# Patient Record
Sex: FEMALE | Race: WHITE | NOT HISPANIC OR LATINO | Employment: FULL TIME | URBAN - METROPOLITAN AREA
[De-identification: names, ages, dates, MRNs, and addresses within clinical notes are randomized per-mention and may not be internally consistent; named-entity substitution may affect disease eponyms.]

---

## 2017-05-05 ENCOUNTER — TRANSFERRED RECORDS (OUTPATIENT)
Dept: HEALTH INFORMATION MANAGEMENT | Facility: CLINIC | Age: 57
End: 2017-05-05

## 2017-05-17 ENCOUNTER — TRANSFERRED RECORDS (OUTPATIENT)
Dept: HEALTH INFORMATION MANAGEMENT | Facility: CLINIC | Age: 57
End: 2017-05-17

## 2017-05-31 ENCOUNTER — TRANSFERRED RECORDS (OUTPATIENT)
Dept: HEALTH INFORMATION MANAGEMENT | Facility: CLINIC | Age: 57
End: 2017-05-31

## 2017-06-26 ENCOUNTER — OFFICE VISIT (OUTPATIENT)
Dept: CARDIOLOGY | Facility: CLINIC | Age: 57
End: 2017-06-26
Attending: NURSE PRACTITIONER
Payer: COMMERCIAL

## 2017-06-26 ENCOUNTER — PRE VISIT (OUTPATIENT)
Dept: CARDIOLOGY | Facility: CLINIC | Age: 57
End: 2017-06-26

## 2017-06-26 VITALS
HEIGHT: 70 IN | DIASTOLIC BLOOD PRESSURE: 96 MMHG | BODY MASS INDEX: 33.93 KG/M2 | HEART RATE: 78 BPM | SYSTOLIC BLOOD PRESSURE: 134 MMHG | OXYGEN SATURATION: 98 % | WEIGHT: 237 LBS

## 2017-06-26 DIAGNOSIS — I48.0 PAROXYSMAL ATRIAL FIBRILLATION (H): Primary | ICD-10-CM

## 2017-06-26 PROCEDURE — 93005 ELECTROCARDIOGRAM TRACING: CPT | Mod: ZF

## 2017-06-26 PROCEDURE — 99212 OFFICE O/P EST SF 10 MIN: CPT | Mod: ZF

## 2017-06-26 PROCEDURE — 93010 ELECTROCARDIOGRAM REPORT: CPT | Mod: ZP | Performed by: INTERNAL MEDICINE

## 2017-06-26 PROCEDURE — 99204 OFFICE O/P NEW MOD 45 MIN: CPT | Mod: ZP | Performed by: NURSE PRACTITIONER

## 2017-06-26 RX ORDER — POTASSIUM CHLORIDE 1500 MG/1
40 TABLET, EXTENDED RELEASE ORAL
Status: CANCELLED | OUTPATIENT
Start: 2017-06-26

## 2017-06-26 RX ORDER — POTASSIUM CHLORIDE 1500 MG/1
20 TABLET, EXTENDED RELEASE ORAL
Status: CANCELLED | OUTPATIENT
Start: 2017-06-26

## 2017-06-26 RX ORDER — VENLAFAXINE HYDROCHLORIDE 150 MG/1
150 TABLET, EXTENDED RELEASE ORAL EVERY EVENING
COMMUNITY

## 2017-06-26 RX ORDER — LOSARTAN POTASSIUM AND HYDROCHLOROTHIAZIDE 12.5; 5 MG/1; MG/1
1 TABLET ORAL DAILY
COMMUNITY
End: 2017-06-26

## 2017-06-26 RX ORDER — AMOXICILLIN 250 MG
2 CAPSULE ORAL EVERY MORNING
COMMUNITY

## 2017-06-26 RX ORDER — LIDOCAINE 40 MG/G
CREAM TOPICAL
Status: CANCELLED | OUTPATIENT
Start: 2017-06-26

## 2017-06-26 RX ORDER — MAGNESIUM SULFATE HEPTAHYDRATE 40 MG/ML
2 INJECTION, SOLUTION INTRAVENOUS
Status: CANCELLED | OUTPATIENT
Start: 2017-06-26

## 2017-06-26 NOTE — PATIENT INSTRUCTIONS
You are scheduled for a Transesophageal Echocardiogram followed by a Cardioversion at the [facility] ([address], [facility] [address], [phone]).       Follow these instructions:    1. Report to the GOLD waiting room in the [facility] hospital on: June 27   At 12pm  PLEASE DO FOLLOW THE TIME INDICATED ON THE APPOINTMENT REMINDER CALL.    2. DO NOT EAT OR DRINK ANYTHING FOR 6 HOURS PRIOR TO ARRIVAL.     3. The morning of your procedure you may take your scheduled medications with a SIP of water. If you take diabetic medications or a diuretic, you may hold these.     4. You will receive medication that makes you sleepy; you will need a  and someone to stay with you for 24 hours following this procedure.    You should not make any legal decisions for 24 hours following discharge.         If your question concerns the schedule/appointment times, contact:  ZEENAT Borrero Procedure   [phone]          Cardiology Provider you saw in clinic today: ADRIANNA Kirby CNP    Medication Changes:   No changes today    Labs/Tests needed:  None today     Follow-up Visit:   Go forward with [facility].      Further Instructions:      You will receive all normal lab and testing results via [employer] or mail if not reviewed in clinic today. Please contact our office if you need assistance with setting up MyChart.    If you need a medication refill please contact your pharmacy. Please allow 3 business days for your refill to be completed.     As always, thank you for trusting us with your health care needs!    If you have any questions regarding your visit please contact your care team:   Cardiology  Telephone Number    EP RN  Electrophysiology Nurse Coordinator [phone]     Call for EP procedure scheduling concerns  ZEENAT Borrero  [phone]           Device Clinic (Pacemakers, ICDs, Loop)   RN's : Ana Sanchez Connie, Dawn During business hours: [phone]    After  business hours:   [phone]- select option 4 and ask for job code [id number].          Cardioversion  Cardioversion is a procedure to restore your heart s normal rhythm from a fast or irregular rhythm (arrhythmia) in the top or bottom chambers of your heart. You may have the procedure in a hospital or surgery center. It s often done on an outpatient (same day) basis. During the procedure, your doctor will give you medication to minimize discomfort. Then the doctor gives you a brief electric shock. This helps your heartbeat become normal again. In most cases, you can go home within hours of the procedure.     During cardioversion, you are fully sedated and won t feel a thing.     Before Your Procedure    Tell your doctor what over-the-counter and prescription medications, herbs, and supplements you are taking.    Take medication as directed. Your doctor may prescribe anticoagulants (blood thinners), depending on your situation. They help prevent blood clots from forming. Your doctor may order an ultrasound called transesophageal echocardiography. This will help your doctor figure out if you need a blood thinner.     Ask your doctor about the risks and benefits of cardioversion.    Sign your consent form.    Don t eat or drink anything for 8 to 12 hours before your procedure.    Follow any other instructions your doctor gives you.     Arrange for an adult to drive you home after the procedure.  During Your Procedure    Your health care provider will place small pads (electrodes) on your chest to record your heartbeat at all times.    Your health care provider will place an intravenous (IV) line in your arm. This gives you medication (sedation) that keeps you free of pain. You ll feel sleepy.    Your health care provider will give you oxygen through a soft, plastic tube in your nose.    Pads will be placed on your chest and back. Your health care provider will give you a very brief electric shock through the pads.  Remember, because of sedation, you won t feel the shock.    Your doctor will watch your heartbeat to make sure your normal rhythm has been restored.   After Your Procedure    Your health care provider will monitor you until you are fully awake. Then you ll be able to sit up, walk, and eat.    In most cases, you ll be able to go home after the sedation wears off. This usually takes a few hours.    For a few days, the skin on your chest may feel a little sore, like a mild sunburn.    Don t drive or operate heavy machinery for 24 hours after the procedure.    The day after your procedure, try to take it easy. Take medication as directed.    Call your doctor if you notice skipped beats, a rapid heartbeat, or chest tightness. These may be signs that an irregular heartbeat has returned.  Date Last Reviewed: 2/26/2014 2000-2017 [employer]. [address] [place] [address]. Todos los derechos reservados. Esta información no pretende sustituir la atención médica profesional. Sólo vargas médico puede diagnosticar y tratar un problema de rebecca.        Cardioversion  Cardioversion is a procedure to restore your heart s normal rhythm from a fast or irregular rhythm (arrhythmia) in the top or bottom chambers of your heart. You may have the procedure in a hospital or surgery center. It s often done on an outpatient (same day) basis. During the procedure, your doctor will give you medication to minimize discomfort. Then the doctor gives you a brief electric shock. This helps your heartbeat become normal again. In most cases, you can go home within hours of the procedure.     During cardioversion, you are fully sedated and won t feel a thing.     Before Your Procedure    Tell your doctor what over-the-counter and prescription medications, herbs, and supplements you are taking.    Take medication as directed. Your doctor may prescribe anticoagulants (blood thinners), depending on your situation. They help  prevent blood clots from forming. Your doctor may order an ultrasound called transesophageal echocardiography. This will help your doctor figure out if you need a blood thinner.     Ask your doctor about the risks and benefits of cardioversion.    Sign your consent form.    Don t eat or drink anything for 8 to 12 hours before your procedure.    Follow any other instructions your doctor gives you.     Arrange for an adult to drive you home after the procedure.  During Your Procedure    Your health care provider will place small pads (electrodes) on your chest to record your heartbeat at all times.    Your health care provider will place an intravenous (IV) line in your arm. This gives you medication (sedation) that keeps you free of pain. You ll feel sleepy.    Your health care provider will give you oxygen through a soft, plastic tube in your nose.    Pads will be placed on your chest and back. Your health care provider will give you a very brief electric shock through the pads. Remember, because of sedation, you won t feel the shock.    Your doctor will watch your heartbeat to make sure your normal rhythm has been restored.   After Your Procedure    Your health care provider will monitor you until you are fully awake. Then you ll be able to sit up, walk, and eat.    In most cases, you ll be able to go home after the sedation wears off. This usually takes a few hours.    For a few days, the skin on your chest may feel a little sore, like a mild sunburn.    Don t drive or operate heavy machinery for 24 hours after the procedure.    The day after your procedure, try to take it easy. Take medication as directed.    Call your doctor if you notice skipped beats, a rapid heartbeat, or chest tightness. These may be signs that an irregular heartbeat has returned.  Date Last Reviewed: 2/26/2014 2000-2017 [employer]. [address] [place] [address], [place] [address]. Todos los derechos reservados. Esta información  no pretende sustituir la atención médica profesional. Sólo vargas médico puede diagnosticar y tratar un problema de rebecca.

## 2017-06-26 NOTE — PROGRESS NOTES
Clinical Cardiac Electrophysiology    Chief Complaint: atrial fibrillation    HPI: Barb Hernandez is a 57 year old female who presents to [facility] care.  She gets her regulary cardiology care  at [facility] and is scheduled to have Pulmonary vein isolation in August of which she is scheduled to have a CT completed on 6/29.   Her past medical history includes HTN, FMD, hyperlipidemia, depression, anxiety    Today she presents with her  for paroxsymal atrial fibrillation.  States she has always had an irregular heart beat and has mitral valve regurgitation.  Describes her first episode of atrial fibrillation occurring in 2015 with an acute illness.  Over the past year she has had multiple ED visits due to atrial fibrillation. Denies receiving DCCV.  Was on flecainide 50 mg BID but was discontinued because she was not able to undergo a stress test due to high right rates.   On June 6, she was started on amiodarone 400 mg daily until 6/6 then 200 mg daily for 1 month.  When in atrial fibrillation, she describes her symptoms as  presyncope, SOB, fatigue, palpitations.  She is currently having symptoms at least weekly and last from a few minutes to all day.  She often will take an extra metoprolol XL 25 mg when these symptoms occur.  She does not regularly exercise due to tachycardia and palpitations.        Today's EKG atrial fibrillation with ventricular rate of 83 bpm.     Her history appears to be the following  The MD note from 4/24/2015 at [facility] her atrial fibrillation was resolved once changing from albuterol and adding diltiazem and metoprolol but she was bradycardic and lightheaded;  diltiazem was discontinued.       On 5/22/2015, saw Dr. Harry Gan in cardiology for atrial fib and symptomatic PVCs documented on 2 holter monitors.  Recommended ECHO and 30 day monitor which showed 1.1% PVC burden, no afib, 16 PACs, mild LA dilation, mild mitral regurgitation.     3/16/2017  started on Xarelto and increased metoprolol  mg daily    5/5/2017  Saw cardiology was noted to started on flecainide 50 mg BID started on 3/28/2017 with  decreased palpitations    5/17/2017 Stopped metoprolol 5/14 due to scheduled stress test presented to the ED with palpitations, SOB, lightheadedness      5/31/2017 ED with atrial fibrillation given IV fluids and metoprolol       Current Outpatient Prescriptions   Medication Sig Dispense Refill     metoprolol-hydrochlorothiazide (DUTOPROL) 50-12.5 MG TB24 per tablet Take 1 tablet by mouth daily       losartan-hydrochlorothiazide (HYZAAR) 50-12.5 MG per tablet Take 1 tablet by mouth daily       HYDROCHLOROTHIAZIDE PO Take 25 mg by mouth daily       venlafaxine (EFFEXOR-ER) 150 MG TB24 24 hr tablet Take 150 mg by mouth daily (with breakfast)       BUSPIRONE HCL PO Take 10 mg by mouth daily       PRAVASTATIN SODIUM PO Take 20 mg by mouth daily       senna-docusate (SENOKOT-S;PERICOLACE) 8.6-50 MG per tablet Take 1 tablet by mouth daily as needed for constipation       magnesium sulfate (EPSOM SALT) GRAN granules Apply topically 2 times daily as needed for skin care       Rivaroxaban (XARELTO PO) Take 20 mg by mouth daily       AMIODARONE HCL PO Take 400 mg by mouth daily       [START ON 7/7/2017] AMIODARONE HCL PO Take 200 mg by mouth daily         Past Medical History:   Diagnosis Date     Atrial fibrillation (H)      Fibromyalgia      HTN (hypertension)        No past surgical history on file.    Family History   Problem Relation Age of Onset     Heart Murmur Sister      prolapse       Social History   Substance Use Topics     Smoking status: Not on file     Smokeless tobacco: Not on file     Alcohol use Not on file       Allergies not on file      ROS:   CONSTITUTIONAL:No report of fever, chills,  or change in weight  RESPIRATORY: No cough, wheezing, SOB, or hemoptysis  CARDIOVASCULAR: see HPI  MUSCULO-SKELETAL: No joint pain/swelling, no muslce pain  NEURO:  "No paresthesias, syncope, pre-syncope, light headness, dizziness or vertigo  ENDOCRINE: No temperature intolerance, no skin/hair changes  PSYCHIATRIC: No change in mood, feeling down/anxious, no change in sleep or appetite  GI: no melena or hematochezia, no change in bowel habits  : no hematuria or dysurea, no hesitancy, dribbling or incontinence  HEME: no easy bruising or bleeding, no history of anemia, no history of blood clots  SKIN: no rashes or sores, no unusual itching        Physical Examination:  Vitals: BP (!) 134/96 (BP Location: Right arm, Patient Position: Sitting, Cuff Size: Adult Regular)  Pulse 78  Ht 1.765 m (5' 9.5\")  Wt 107.5 kg (237 lb)  SpO2 98%  BMI 34.5 kg/m2  BMI= There is no height or weight on file to calculate BMI.    GENERAL APPEARANCE: obese, healthy, alert and no distress  HEENT: no icterus, no xanthelasmas, normal pupil size and reaction, normal palate, mucosa moist, no cyanosis.  NECK: no adenopathy, no asymmetry, masses, or scars, carotid upstrokes are normal bilaterally, no cervical bruits, JVP is not visible  CHEST: lungs clear to auscultation - no rales, rhonchi or wheezes, no use of accessory muscles, no retractions, respirations are unlabored, normal respiratory rate, no kyphosis, no scoliosis  CARDIOVASCULAR: irregularly irregular with murmur, normal S1 with physiologic split S2, no S3 or S4, click or rub, precordium quiet with normal PMI.  ABDOMEN: soft, obese, non tender,  bowel sounds normal, aorta not enlarged by palpation, no abdominal bruits  EXTREMITIES: no clubbing, cyanosis or edema  NEURO: alert and oriented to person/place/time, normal speech, gait and affect  VASC: Radial and posterior tibialis pulses are normal in volumes and symmetric bilaterally.   SKIN: no ecchymoses, no rashes      Laboratory:          ECHO 3/16/2017  Clinical Indications:AFIB         CONCLUSION:    Normal LV size and systolic function. EF 65%    Normal RV size and function.     Mild to " moderate  mitral regurgitation. Normal mitral valve anatomy.     Normal aortic valve function and structure.     Normal IVC size and mean RA pressure 3 mm Hg. Est RV peak     pressure of 24 mm Hg.      Left Ventricular Ejection Fraction: 65 %        ICD Codes:        Technical Quality:      Patient Vital Signs: Ht HT  71                         Ht(in):                         Wt (lb):238                         BSA:   2.33                         BP:    139  / 74            IV Information:    IV Inserted By:    IV Site:    IV Site Appearance:    IV Size:    IV Removed By:    IV Other:                     2D, Doppler and color flow Doppler study                      performed.           Measurements:    M-MODE    IVS to PW Ratio MM                1.3                       Aortic Root Diameter MM           2.5 cm                Show      2D ECHO    Body Height                       71 in                     Body Weight                       238 lb                    Body Surface Area                 2.3 m                     LV Diastolic Diameter Base LX     4.4 cm                3.5-5.7    LV Systolic Diameter Base LX      2.8 cm                2.3-4.9    LV Diastolic Diameter Index       1.9 cm/m                  IVS Diastolic Thickness           1.2 cm                0.6-1.1 cm    LVPW Diastolic Thickness          0.9 cm                0.6-1.1 cm    Ascending Aorta Diameter          2.9 cm                    LA Area 2C View                   21.1 cm               <= 20 cm       DOPPLER    LVOT Diameter                     1.8 cm                    LVOT Area                         2.5 cm                    Mitral E Point Velocity           92.7 cm/s                 Mitral  A Point Velocity          55 cm/s                   Mitral E to A Ratio               1.7                       PV Peak Velocity                  120 cm/s                  PV Peak Gradient                  5.8 mmHg                  TR Peak  Velocity                  229 cm/s                  TR Peak Gradient                  21 mmHg                   LV E' Lateral Velocity            8.5 cm/s                  LV E' Septal Velocity             7.5 cm/s                  Mitral E to LV E' Lateral Ratio   10.9                      Mitral E to LV E' Septal Ratio    12.3                        COLOR DOPPLER    MR Peak Velocity                  461 cm/s                  LVOT Diameter                     1.8 cm                    LA Area 2C View                   21.1 cm               <= 20 cm               Left Ventricle:     Normal LV size and systolic function.    Right Ventricle:    Normal RV size and function.    Left Atrium:        Normal LA size.    Right Atrium:       Normal RA size.    Aortic Valve:       Aortic valve is tri-leaflet and functions                         normally.    Mitral Valve:       Mild mitral regurgitation. Normal mitral valve                         anatomy.    Tricuspid Valve:    Trace tricuspid regurgitation. Normal tricuspid                         valve anatomy.    Pulmonic Valve:     Probably normal pulmonic valve.    Aorta:              Visualized limited portions of the aorta are                         normal.    Pericardium:        Normal pericardium.    IVC:                Normal IVC.    IAS:                Interatrial septum appears intact.    3D Imaging/Contrast:        Assessment and recommendations:    We discussed in detail with the patient management/treatment options for Ana Luisa includin. Stroke Prophylaxis:  CHADSVASC=female, HTN  2, corresponding to a 2.2% annual stroke / systemic emolism event rate. indicating need for long term oral anticoagulation.  SHe is appropriately on Xarelto and has taken uninterrupted for at least the last 30 days   2. Rate Control: She is currently taking metoprolol XL 50 mg daily which is decreased from 100 mg due to side effects.    3. Rhythm Control: Cardioversion,  Antiarrhythmics and/or ablation are options for rhythm control. She is scheduled for a PVI ablation at [facility] in August 2017 and has a CT scheduled on June 29 as part of the workup.   However, she would like to transfer care to [facility].   Explained to her this writer does not preform PVI ablations and she would need to see a electrophysiologist at [facility] who would preform their own evaluation of her condition.  She would like to proceed with a DCCV this week.  She has taken Xarelto for at least the past 30 days, she is currently on amiodarone, and feeling symptomatic with atrial fibrillation.    4. Risk Factor Management: She is schedule to have a sleep study in August 2017.      Schedule a DCCV   Follow up with Dr. Doll    I appreciate the chance to help with Ms. David salazar. Please let me know if you have any questions or concerns.    ADRIANNA Kirby, CNP

## 2017-06-26 NOTE — LETTER
6/26/2017      RE: Barb Hernandez  [address]  [facility] [address]       Dear Colleague,    Thank you for the opportunity to participate in the care of your patient, Barb Hernandez, at the [facility] at [facility]. Please see a copy of my visit note below.    Clinical Cardiac Electrophysiology    Chief Complaint: atrial fibrillation    HPI: Barb Hernandez is a 57 year old female who presents to [facility] care.  She gets her regulary cardiology care  at [facility] and is scheduled to have Pulmonary vein isolation in August of which she is scheduled to have a CT completed on 6/29.   Her past medical history includes HTN, FMD, hyperlipidemia, depression, anxiety    Today she presents with her  for paroxsymal atrial fibrillation.  States she has always had an irregular heart beat and has mitral valve regurgitation.  Describes her first episode of atrial fibrillation occurring in 2015 with an acute illness.  Over the past year she has had multiple ED visits due to atrial fibrillation. Denies receiving DCCV.  Was on flecainide 50 mg BID but was discontinued because she was not able to undergo a stress test due to high right rates.   On June 6, she was started on amiodarone 400 mg daily until 6/6 then 200 mg daily for 1 month.  When in atrial fibrillation, she describes her symptoms as  presyncope, SOB, fatigue, palpitations.  She is currently having symptoms at least weekly and last from a few minutes to all day.  She often will take an extra metoprolol XL 25 mg when these symptoms occur.  She does not regularly exercise due to tachycardia and palpitations.        Today's EKG atrial fibrillation with ventricular rate of 83 bpm.     Her history appears to be the following  The MD note from 4/24/2015 at [facility] her atrial fibrillation was resolved once changing from albuterol and adding diltiazem and metoprolol but she was bradycardic and  lightheaded;  diltiazem was discontinued.       On 5/22/2015, saw Dr. Harry Gan in cardiology for atrial fib and symptomatic PVCs documented on 2 holter monitors.  Recommended ECHO and 30 day monitor which showed 1.1% PVC burden, no afib, 16 PACs, mild LA dilation, mild mitral regurgitation.     3/16/2017 started on Xarelto and increased metoprolol  mg daily    5/5/2017  Saw cardiology was noted to started on flecainide 50 mg BID started on 3/28/2017 with  decreased palpitations    5/17/2017 Stopped metoprolol 5/14 due to scheduled stress test presented to the ED with palpitations, SOB, lightheadedness      5/31/2017 ED with atrial fibrillation given IV fluids and metoprolol       Current Outpatient Prescriptions   Medication Sig Dispense Refill     metoprolol-hydrochlorothiazide (DUTOPROL) 50-12.5 MG TB24 per tablet Take 1 tablet by mouth daily       losartan-hydrochlorothiazide (HYZAAR) 50-12.5 MG per tablet Take 1 tablet by mouth daily       HYDROCHLOROTHIAZIDE PO Take 25 mg by mouth daily       venlafaxine (EFFEXOR-ER) 150 MG TB24 24 hr tablet Take 150 mg by mouth daily (with breakfast)       BUSPIRONE HCL PO Take 10 mg by mouth daily       PRAVASTATIN SODIUM PO Take 20 mg by mouth daily       senna-docusate (SENOKOT-S;PERICOLACE) 8.6-50 MG per tablet Take 1 tablet by mouth daily as needed for constipation       magnesium sulfate (EPSOM SALT) GRAN granules Apply topically 2 times daily as needed for skin care       Rivaroxaban (XARELTO PO) Take 20 mg by mouth daily       AMIODARONE HCL PO Take 400 mg by mouth daily       [START ON 7/7/2017] AMIODARONE HCL PO Take 200 mg by mouth daily         Past Medical History:   Diagnosis Date     Atrial fibrillation (H)      Fibromyalgia      HTN (hypertension)        No past surgical history on file.    Family History   Problem Relation Age of Onset     Heart Murmur Sister      prolapse       Social History   Substance Use Topics     Smoking status: Not on file  "    Smokeless tobacco: Not on file     Alcohol use Not on file       Allergies not on file      ROS:   CONSTITUTIONAL:No report of fever, chills,  or change in weight  RESPIRATORY: No cough, wheezing, SOB, or hemoptysis  CARDIOVASCULAR: see HPI  MUSCULO-SKELETAL: No joint pain/swelling, no muslce pain  NEURO: No paresthesias, syncope, pre-syncope, light headness, dizziness or vertigo  ENDOCRINE: No temperature intolerance, no skin/hair changes  PSYCHIATRIC: No change in mood, feeling down/anxious, no change in sleep or appetite  GI: no melena or hematochezia, no change in bowel habits  : no hematuria or dysurea, no hesitancy, dribbling or incontinence  HEME: no easy bruising or bleeding, no history of anemia, no history of blood clots  SKIN: no rashes or sores, no unusual itching        Physical Examination:  Vitals: BP (!) 134/96 (BP Location: Right arm, Patient Position: Sitting, Cuff Size: Adult Regular)  Pulse 78  Ht 1.765 m (5' 9.5\")  Wt 107.5 kg (237 lb)  SpO2 98%  BMI 34.5 kg/m2  BMI= There is no height or weight on file to calculate BMI.    GENERAL APPEARANCE: obese, healthy, alert and no distress  HEENT: no icterus, no xanthelasmas, normal pupil size and reaction, normal palate, mucosa moist, no cyanosis.  NECK: no adenopathy, no asymmetry, masses, or scars, carotid upstrokes are normal bilaterally, no cervical bruits, JVP is not visible  CHEST: lungs clear to auscultation - no rales, rhonchi or wheezes, no use of accessory muscles, no retractions, respirations are unlabored, normal respiratory rate, no kyphosis, no scoliosis  CARDIOVASCULAR: irregularly irregular with murmur, normal S1 with physiologic split S2, no S3 or S4, click or rub, precordium quiet with normal PMI.  ABDOMEN: soft, obese, non tender,  bowel sounds normal, aorta not enlarged by palpation, no abdominal bruits  EXTREMITIES: no clubbing, cyanosis or edema  NEURO: alert and oriented to person/place/time, normal speech, gait and " affect  VASC: Radial and posterior tibialis pulses are normal in volumes and symmetric bilaterally.   SKIN: no ecchymoses, no rashes      Laboratory:          ECHO 3/16/2017  Clinical Indications:AFIB         CONCLUSION:    Normal LV size and systolic function. EF 65%    Normal RV size and function.     Mild to moderate  mitral regurgitation. Normal mitral valve anatomy.     Normal aortic valve function and structure.     Normal IVC size and mean RA pressure 3 mm Hg. Est RV peak     pressure of 24 mm Hg.      Left Ventricular Ejection Fraction: 65 %        ICD Codes:        Technical Quality:      Patient Vital Signs: Ht HT  71                         Ht(in):                         Wt (lb):238                         BSA:   2.33                         BP:    139  / 74            IV Information:    IV Inserted By:    IV Site:    IV Site Appearance:    IV Size:    IV Removed By:    IV Other:                     2D, Doppler and color flow Doppler study                      performed.           Measurements:    M-MODE    IVS to PW Ratio MM                1.3                       Aortic Root Diameter MM           2.5 cm                Show      2D ECHO    Body Height                       71 in                     Body Weight                       238 lb                    Body Surface Area                 2.3 m                     LV Diastolic Diameter Base LX     4.4 cm                3.5-5.7    LV Systolic Diameter Base LX      2.8 cm                2.3-4.9    LV Diastolic Diameter Index       1.9 cm/m                  IVS Diastolic Thickness           1.2 cm                0.6-1.1 cm    LVPW Diastolic Thickness          0.9 cm                0.6-1.1 cm    Ascending Aorta Diameter          2.9 cm                    LA Area 2C View                   21.1 cm               <= 20 cm       DOPPLER    LVOT Diameter                     1.8 cm                    LVOT Area                         2.5 cm                     Mitral E Point Velocity           92.7 cm/s                 Mitral  A Point Velocity          55 cm/s                   Mitral E to A Ratio               1.7                       PV Peak Velocity                  120 cm/s                  PV Peak Gradient                  5.8 mmHg                  TR Peak Velocity                  229 cm/s                  TR Peak Gradient                  21 mmHg                   LV E' Lateral Velocity            8.5 cm/s                  LV E' Septal Velocity             7.5 cm/s                  Mitral E to LV E' Lateral Ratio   10.9                      Mitral E to LV E' Septal Ratio    12.3                        COLOR DOPPLER    MR Peak Velocity                  461 cm/s                  LVOT Diameter                     1.8 cm                    LA Area 2C View                   21.1 cm               <= 20 cm               Left Ventricle:     Normal LV size and systolic function.    Right Ventricle:    Normal RV size and function.    Left Atrium:        Normal LA size.    Right Atrium:       Normal RA size.    Aortic Valve:       Aortic valve is tri-leaflet and functions                         normally.    Mitral Valve:       Mild mitral regurgitation. Normal mitral valve                         anatomy.    Tricuspid Valve:    Trace tricuspid regurgitation. Normal tricuspid                         valve anatomy.    Pulmonic Valve:     Probably normal pulmonic valve.    Aorta:              Visualized limited portions of the aorta are                         normal.    Pericardium:        Normal pericardium.    IVC:                Normal IVC.    IAS:                Interatrial septum appears intact.    3D Imaging/Contrast:        Assessment and recommendations:    We discussed in detail with the patient management/treatment options for A.fib includin. Stroke Prophylaxis:  CHADSVASC=female, HTN  2, corresponding to a 2.2% annual stroke / systemic emolism event rate.  indicating need for long term oral anticoagulation.  SHe is appropriately on Xarelto and has taken uninterrupted for at least the last 30 days   2. Rate Control: She is currently taking metoprolol XL 50 mg daily which is decreased from 100 mg due to side effects.    3. Rhythm Control: Cardioversion, Antiarrhythmics and/or ablation are options for rhythm control. She is scheduled for a PVI ablation at [facility] in August 2017 and has a CT scheduled on June 29 as part of the workup.   However, she would like to transfer care to [facility].   Explained to her this writer does not preform PVI ablations and she would need to see a electrophysiologist at [facility] who would preform their own evaluation of her condition.  She would like to proceed with a DCCV this week.  She has taken Xarelto for at least the past 30 days, she is currently on amiodarone, and feeling symptomatic with atrial fibrillation.    4. Risk Factor Management: She is schedule to have a sleep study in August 2017.      Schedule a DCCV   Follow up with Dr. Doll    I appreciate the chance to help with Ms. David salazar. Please let me know if you have any questions or concerns.    Narda Benavidez, ADRIANNA, CNP

## 2017-06-26 NOTE — NURSING NOTE
Chief Complaint   Patient presents with     Follow Up For     EKG - new to [facility] - A-Fib      Trisha Jain June 26, 2017  11:51am

## 2017-06-26 NOTE — MR AVS SNAPSHOT
After Visit Summary   6/26/2017    Barb Hernandez    MRN: [id number]           Patient Information     Date Of Birth          1960        Visit Information        Provider Department      6/26/2017 11:30 AM Narda Benavidez APRN CNP [facility]        Today's Diagnoses     Paroxysmal atrial fibrillation (H)    -  1      Care Instructions    You are scheduled for a Transesophageal Echocardiogram followed by a Cardioversion at the [facility] ([address], [facility] [address], [phone]).       Follow these instructions:    1. Report to the GOLD waiting room in the [facility] on: June 27   At 12pm  PLEASE DO FOLLOW THE TIME INDICATED ON THE APPOINTMENT REMINDER CALL.    2. DO NOT EAT OR DRINK ANYTHING FOR 6 HOURS PRIOR TO ARRIVAL.     3. The morning of your procedure you may take your scheduled medications with a SIP of water. If you take diabetic medications or a diuretic, you may hold these.     4. You will receive medication that makes you sleepy; you will need a  and someone to stay with you for 24 hours following this procedure.    You should not make any legal decisions for 24 hours following discharge.         If your question concerns the schedule/appointment times, contact:  [facility]   [phone]          Cardiology Provider you saw in clinic today: ADRIANNA Kirby CNP    Medication Changes:   No changes today    Labs/Tests needed:  None today     Follow-up Visit:   Go forward with [facility].      Further Instructions:      You will receive all normal lab and testing results via [employer] or mail if not reviewed in clinic today. Please contact our office if you need assistance with setting up [employer].    If you need a medication refill please contact your pharmacy. Please allow 3 business days for your refill to be completed.     As always, thank you for trusting us with your health care needs!    If you  have any questions regarding your visit please contact your care team:   Cardiology  Telephone Number    EP RN  Electrophysiology Nurse Coordinator [phone]     Call for EP procedure scheduling concerns  ZEENAT Borrero  [phone]           Device Clinic (Pacemakers, ICDs, Loop)   RN's : Ana Sanchez Connie, Dawn During business hours: [phone]    After business hours:   [phone]- select option 4 and ask for job code [id number].          Cardioversion  Cardioversion is a procedure to restore your heart s normal rhythm from a fast or irregular rhythm (arrhythmia) in the top or bottom chambers of your heart. You may have the procedure in a hospital or surgery center. It s often done on an outpatient (same day) basis. During the procedure, your doctor will give you medication to minimize discomfort. Then the doctor gives you a brief electric shock. This helps your heartbeat become normal again. In most cases, you can go home within hours of the procedure.     During cardioversion, you are fully sedated and won t feel a thing.     Before Your Procedure    Tell your doctor what over-the-counter and prescription medications, herbs, and supplements you are taking.    Take medication as directed. Your doctor may prescribe anticoagulants (blood thinners), depending on your situation. They help prevent blood clots from forming. Your doctor may order an ultrasound called transesophageal echocardiography. This will help your doctor figure out if you need a blood thinner.     Ask your doctor about the risks and benefits of cardioversion.    Sign your consent form.    Don t eat or drink anything for 8 to 12 hours before your procedure.    Follow any other instructions your doctor gives you.     Arrange for an adult to drive you home after the procedure.  During Your Procedure    Your health care provider will place small pads (electrodes) on your chest to record your heartbeat at all times.    Your health care  provider will place an intravenous (IV) line in your arm. This gives you medication (sedation) that keeps you free of pain. You ll feel sleepy.    Your health care provider will give you oxygen through a soft, plastic tube in your nose.    Pads will be placed on your chest and back. Your health care provider will give you a very brief electric shock through the pads. Remember, because of sedation, you won t feel the shock.    Your doctor will watch your heartbeat to make sure your normal rhythm has been restored.   After Your Procedure    Your health care provider will monitor you until you are fully awake. Then you ll be able to sit up, walk, and eat.    In most cases, you ll be able to go home after the sedation wears off. This usually takes a few hours.    For a few days, the skin on your chest may feel a little sore, like a mild sunburn.    Don t drive or operate heavy machinery for 24 hours after the procedure.    The day after your procedure, try to take it easy. Take medication as directed.    Call your doctor if you notice skipped beats, a rapid heartbeat, or chest tightness. These may be signs that an irregular heartbeat has returned.  Date Last Reviewed: 2/26/2014 2000-2017 [employer]. [address] [place] [address], [place] [address]. Todos los derechos reservados. Esta información no pretende sustituir la atención médica profesional. Sólo vargas médico puede diagnosticar y tratar un problema de rebecca.        Cardioversion  Cardioversion is a procedure to restore your heart s normal rhythm from a fast or irregular rhythm (arrhythmia) in the top or bottom chambers of your heart. You may have the procedure in a hospital or surgery center. It s often done on an outpatient (same day) basis. During the procedure, your doctor will give you medication to minimize discomfort. Then the doctor gives you a brief electric shock. This helps your heartbeat become normal again. In most cases, you can go home  within hours of the procedure.     During cardioversion, you are fully sedated and won t feel a thing.     Before Your Procedure    Tell your doctor what over-the-counter and prescription medications, herbs, and supplements you are taking.    Take medication as directed. Your doctor may prescribe anticoagulants (blood thinners), depending on your situation. They help prevent blood clots from forming. Your doctor may order an ultrasound called transesophageal echocardiography. This will help your doctor figure out if you need a blood thinner.     Ask your doctor about the risks and benefits of cardioversion.    Sign your consent form.    Don t eat or drink anything for 8 to 12 hours before your procedure.    Follow any other instructions your doctor gives you.     Arrange for an adult to drive you home after the procedure.  During Your Procedure    Your health care provider will place small pads (electrodes) on your chest to record your heartbeat at all times.    Your health care provider will place an intravenous (IV) line in your arm. This gives you medication (sedation) that keeps you free of pain. You ll feel sleepy.    Your health care provider will give you oxygen through a soft, plastic tube in your nose.    Pads will be placed on your chest and back. Your health care provider will give you a very brief electric shock through the pads. Remember, because of sedation, you won t feel the shock.    Your doctor will watch your heartbeat to make sure your normal rhythm has been restored.   After Your Procedure    Your health care provider will monitor you until you are fully awake. Then you ll be able to sit up, walk, and eat.    In most cases, you ll be able to go home after the sedation wears off. This usually takes a few hours.    For a few days, the skin on your chest may feel a little sore, like a mild sunburn.    Don t drive or operate heavy machinery for 24 hours after the procedure.    The day after your  procedure, try to take it easy. Take medication as directed.    Call your doctor if you notice skipped beats, a rapid heartbeat, or chest tightness. These may be signs that an irregular heartbeat has returned.  Date Last Reviewed: 2/26/2014 2000-2017 [employer]. [address] [place] [address]. Todos los derechos reservados. Esta información no pretende sustituir la atención médica profesional. Sólo vargas médico puede diagnosticar y tratar un problema de rebecca.                Follow-ups after your visit        Your next 10 appointments already scheduled     Jun 27, 2017 12:00 PM CDT   Procedure 3 hour with U2A ROOM 6   [address] [facility] ([facility])    [address] [address]               Jun 27, 2017  1:00 PM CDT   Cardioversion with UUETEER1   [facility],  [facility] ([facility])    [address] [address]   [phone]           1) NPO for 6 hours prior to the procedure except for sips of water with medications.  2) Hold insulin or oral diabetic medications morning of procedure. May take   dose long acting insulin. Follow further instructions of PMD.  3) Continue daily Coumadin. ~ If taking Digoxin, hold AM of procedure. ~  required. ~ A responsible adult must stay with you for 24 hours after the test.             Jun 27, 2017   Procedure with GENERIC ANESTHESIA PROVIDER   [facility], Same Day Surgery (--)    [address] [address]   [phone]              Future tests that were ordered for you today     Open Future Orders        Priority Expected Expires Ordered    Cardioversion Routine  6/26/2018 6/26/2017    EKG 12-lead, tracing only (Future) Routine  10/24/2017 6/26/2017            Who to contact     If you have questions or need follow up information about today's clinic visit or your schedule please contact [facility]  "directly at [phone].  Normal or non-critical lab and imaging results will be communicated to you by [employer], letter or phone within 4 business days after the clinic has received the results. If you do not hear from us within 7 days, please contact the clinic through [employer] or phone. If you have a critical or abnormal lab result, we will notify you by phone as soon as possible.  Submit refill requests through [employer] or call your pharmacy and they will forward the refill request to us. Please allow 3 business days for your refill to be completed.          Additional Information About Your Visit        [employer] Information     [employer] lets you send messages to your doctor, view your test results, renew your prescriptions, schedule appointments and more. To sign up, go to [email or web address] . Click on \"Log in\" on the left side of the screen, which will take you to the Welcome page. Then click on \"Sign up Now\" on the right side of the page.     You will be asked to enter the access code listed below, as well as some personal information. Please follow the directions to create your username and password.     Your access code is: [email or web address]  Expires: 2017  6:30 AM     Your access code will  in 90 days. If you need help or a new code, please call your [place] clinic or [phone].        Care EveryWhere ID     This is your Care EveryWhere ID. This could be used by other organizations to access your [place] medical records  [id number]        Your Vitals Were     Pulse Height Pulse Oximetry BMI (Body Mass Index)          78 1.765 m (5' 9.5\") 98% 34.5 kg/m2         Blood Pressure from Last 3 Encounters:   17 (!) 134/96    Weight from Last 3 Encounters:   17 107.5 kg (237 lb)                 Today's Medication Changes          These changes are accurate as of: 17  1:18 PM.  If you have any questions, ask your nurse or doctor.               Stop taking these medicines if you haven't " already. Please contact your care team if you have questions.     losartan-hydrochlorothiazide 50-12.5 MG per tablet   Commonly known as:  HYZAAR   Stopped by:  Narda Benavidez APRN CNP           metoprolol-hydrochlorothiazide 50-12.5 MG Tb24 per tablet   Commonly known as:  DUTOPROL   Stopped by:  Narda Benavidez APRN CNP                    Primary Care Provider Office Phone # Fax #    Mindy Castañeda [phone]       [facility] [address]  [facility] [address]        Equal Access to Services     [facility]: Hadii aad ku hadasho Soomaali, waaxda luqadaha, qaybta kaalmada adeegyada, chantale nuno haybrunilda velasquez . So [facility] [phone].    ATENCIÓN: Si habla español, tiene a vargas disposición servicios gratuitos de asistencia lingüística. [facility] [phone].    We comply with applicable federal civil rights laws and [place] laws. We do not discriminate on the basis of race, color, national origin, age, disability sex, sexual orientation or gender identity.            Thank you!     Thank you for choosing [facility]  for your care. Our goal is always to provide you with excellent care. Hearing back from our patients is one way we can continue to improve our services. Please take a few minutes to complete the written survey that you may receive in the mail after your visit with us. Thank you!             Your Updated Medication List - Protect others around you: Learn how to safely use, store and throw away your medicines at www.disposemymeds.org.          This list is accurate as of: 6/26/17  1:18 PM.  Always use your most recent med list.                   Brand Name Dispense Instructions for use Diagnosis    * AMIODARONE HCL PO      Take 400 mg by mouth daily        * AMIODARONE HCL PO   Start taking on:  7/7/2017      Take 200 mg by mouth daily        BUSPIRONE HCL PO      Take 10 mg by mouth daily        HYDROCHLOROTHIAZIDE  PO      Take 25 mg by mouth daily        LOSARTAN POTASSIUM PO      Take 50 mg by mouth        magnesium sulfate Gran granules    EPSOM SALT     Apply topically 2 times daily as needed for skin care        METOPROLOL SUCCINATE ER PO      Take 50 mg by mouth        PRAVASTATIN SODIUM PO      Take 20 mg by mouth daily        senna-docusate 8.6-50 MG per tablet    SENOKOT-S;PERICOLACE     Take 1 tablet by mouth daily as needed for constipation        venlafaxine 150 MG Tb24 24 hr tablet    EFFEXOR-ER     Take 150 mg by mouth daily (with breakfast)        XARELTO PO      Take 20 mg by mouth daily        * Notice:  This list has 2 medication(s) that are the same as other medications prescribed for you. Read the directions carefully, and ask your doctor or other care provider to review them with you.

## 2017-06-27 ENCOUNTER — SURGERY (OUTPATIENT)
Age: 57
End: 2017-06-27

## 2017-06-27 ENCOUNTER — APPOINTMENT (OUTPATIENT)
Dept: MEDSURG UNIT | Facility: CLINIC | Age: 57
End: 2017-06-27
Attending: INTERNAL MEDICINE
Payer: COMMERCIAL

## 2017-06-27 ENCOUNTER — PRE VISIT (OUTPATIENT)
Dept: CARDIOLOGY | Facility: CLINIC | Age: 57
End: 2017-06-27

## 2017-06-27 ENCOUNTER — HOSPITAL ENCOUNTER (OUTPATIENT)
Facility: CLINIC | Age: 57
Discharge: HOME | End: 2017-06-27
Attending: INTERNAL MEDICINE | Admitting: INTERNAL MEDICINE
Payer: COMMERCIAL

## 2017-06-27 VITALS
TEMPERATURE: 98.6 F | HEART RATE: 58 BPM | OXYGEN SATURATION: 97 % | SYSTOLIC BLOOD PRESSURE: 147 MMHG | DIASTOLIC BLOOD PRESSURE: 84 MMHG

## 2017-06-27 DIAGNOSIS — I48.0 PAROXYSMAL ATRIAL FIBRILLATION (H): ICD-10-CM

## 2017-06-27 PROCEDURE — 93010 ELECTROCARDIOGRAM REPORT: CPT | Performed by: INTERNAL MEDICINE

## 2017-06-27 PROCEDURE — 40000172 ZZH STATISTIC PROCEDURE PREP ONLY

## 2017-06-27 PROCEDURE — 40000065 ZZH STATISTIC EKG NON-CHARGEABLE

## 2017-06-27 RX ORDER — POTASSIUM CHLORIDE 750 MG/1
20 TABLET, EXTENDED RELEASE ORAL
Status: DISCONTINUED | OUTPATIENT
Start: 2017-06-27 | End: 2017-06-27 | Stop reason: HOSPADM

## 2017-06-27 RX ORDER — LIDOCAINE 40 MG/G
CREAM TOPICAL
Status: DISCONTINUED | OUTPATIENT
Start: 2017-06-27 | End: 2017-06-27 | Stop reason: HOSPADM

## 2017-06-27 RX ORDER — POTASSIUM CHLORIDE 750 MG/1
40 TABLET, EXTENDED RELEASE ORAL
Status: DISCONTINUED | OUTPATIENT
Start: 2017-06-27 | End: 2017-06-27 | Stop reason: HOSPADM

## 2017-06-27 NOTE — PROGRESS NOTES
Electrophysiology Brief Progress Note:  Ms. Hernandez is a 57 year old female who has a past medical significant for HTN, HLD, depression, PVCs, and PAF (CHADSVASC 2 on Xarelto). She presented yesterday to clinic to to establish care here at [facility]. She has previously been on Flecainide with recurrent AF and decision was made to pursue PVI. She was placed on amiodarone with goal for short course. She has PVI scheduled at [facility] [place] upcoming; however, wanted to get second opinion here. She was found to be back in AF yesterday and she was set up for outpatient DCCV for which she presented today. She was noted to be back in SB and DCCV was cancelled. She would like to establish care here and was referred to Dr. Doll to consider PVI ablation vs. Other rhythm control methods. I had a long discussion with the patient about atrial fibrillation, including the natural history of the disease, risk of embolic events, role of antithrombotic therapy, role of rate control therapy and the role of antiarrhythmic medications.  We discussed atrial fibrillation ablation.  We discussed the AFFIRM trial. She is appropriately on Xarelto for stroke prophylaxis and Metoprolol for rate control. She wishes not to be on long term amiodarone which we agree. Discussed use of Sotalol or Dofetilide. Discussed indications, risks, benefits, and efficacy of PVI ablation for AF. She would like to pursue ablation. She has an appointment tomorrow with Dr. Doll to establish care during which she finalize rhythm control decisions.     She states understanding and is agreeable with the plan.   ADRIANNA Rojas CNP  Electrophysiology Consult Service  Pager: [phone]

## 2017-06-28 ENCOUNTER — OFFICE VISIT (OUTPATIENT)
Dept: CARDIOLOGY | Facility: CLINIC | Age: 57
End: 2017-06-28
Attending: INTERNAL MEDICINE
Payer: COMMERCIAL

## 2017-06-28 VITALS
HEIGHT: 69 IN | OXYGEN SATURATION: 97 % | BODY MASS INDEX: 35.9 KG/M2 | SYSTOLIC BLOOD PRESSURE: 129 MMHG | DIASTOLIC BLOOD PRESSURE: 77 MMHG | HEART RATE: 61 BPM | WEIGHT: 242.4 LBS

## 2017-06-28 DIAGNOSIS — Z79.899 ON AMIODARONE THERAPY: ICD-10-CM

## 2017-06-28 DIAGNOSIS — I48.0 PAROXYSMAL ATRIAL FIBRILLATION (H): Primary | ICD-10-CM

## 2017-06-28 LAB — INTERPRETATION ECG - MUSE: NORMAL

## 2017-06-28 PROCEDURE — 99215 OFFICE O/P EST HI 40 MIN: CPT | Mod: ZF

## 2017-06-28 PROCEDURE — 99214 OFFICE O/P EST MOD 30 MIN: CPT | Mod: ZP | Performed by: INTERNAL MEDICINE

## 2017-06-28 RX ORDER — LIDOCAINE 40 MG/G
CREAM TOPICAL
Status: CANCELLED | OUTPATIENT
Start: 2017-06-28

## 2017-06-28 RX ORDER — DABIGATRAN ETEXILATE 150 MG/1
150 CAPSULE ORAL 2 TIMES DAILY
Qty: 60 CAPSULE | Refills: 5 | Status: SHIPPED | OUTPATIENT
Start: 2017-06-28 | End: 2017-11-22

## 2017-06-28 ASSESSMENT — PAIN SCALES - GENERAL: PAINLEVEL: NO PAIN (0)

## 2017-06-28 NOTE — PATIENT INSTRUCTIONS
Cardiology Provider you saw in clinic: Dr. Doll    Labs/Tests Needed:   1. Labs, pulmonary function tests.    2. Sleep apnea referral: Will do locally.     Medication Changes:   1. Start Pradaxa 150mg twice a day at least 2 weeks prior to procedure date. Wait 24 hours after last dose of Xarelto to start Pradaxa.         You are scheduled for an Atrial Fibrillation Ablation, at [facility] [place], with Dr. Husam Doll. The hospital is located at [address] [place] [address] on the [place] of the [place]. The phone number is: [phone].  If you need to cancel this procedure, please call [phone].       Date:______  Time:______ (Call may come 30 minutes prior or 30 minutes after)  Pre-Anesthesia Phone Call  You do not need to come to the [facility].        Date: _July 12, 2017______  Time:  10a Lab 1st floor of the [facility]  Time: 10:30am 3rd floor of the [facility] for Pulmonary Function     Time: _12 pm__Arrive to the [facility] Waiting Room at the [facility]  Cardiac MRI   1. You will be required to lay flat and follow breath-hold instructions.   2. You will need to remove all metal and answer a safety questionnaire.       Date: August 17, 2017   Time: __5:30 am_Arrive to Unit 3C at the [facility]  Atrial Fibrillation Ablation with Transesophageal Echocardiogram (HESHAM)    1. Please review the attached instructions on showering before your procedure at the end of this letter.  2. Your history and physical will be completed by our nurse practitioner when you arrive.  3. Please do not eat anything for 8 hours prior to your procedure. You may have sips of water up until 2 hours prior to your arrival.  4. The morning of your procedure, you may take your scheduled medications with a sip of water - continue your blood thinner (Pradaxa).  5. If the HESHAM shows a clot in your heart, the procedure will be canceled.  6. You will receive general anesthesia for this procedure.   7. You will stay in  the hospital overnight, and will need a .        Date: __Nov 22, 2017_8a__:   Follow up appointment      Please do not hesitate to utilize MyChart or call us if you have any questions or concerns.    Rach Montenegro RN  Electrophysiology Nurse Coordinator  [phone]    [facility] Procedure   [phone]                Showering Before Surgery   Your surgeon has asked you to take 2 showers before surgery.  Why is this important?  It is normal for bacteria (germs) to be on your skin. The skin protects us from these germs. When you have surgery, we cut the skin. Sometimes germs get into the cuts and cause infection (illness caused by germs). By following the instructions below and using special soap, you will lower the number of germs on your skin. This decreases your chance of infection.  Special soap  Buy or get 8 ounces of antiseptic surgical soap called 4% CHG. Common name brands of this soap are Hibiclens and Exidine.   You can find it at your local pharmacy, clinic or retail store. If you have trouble, ask your pharmacist to help you find the right substitute.   A note about shaving:  Do not shave within 12 inches of your incision (surgical cut) area for at least 3 days before surgery. Shaving can make small cuts in the skin. This puts you at a higher risk of infection.  Items you will need for each shower:    1 newly washed towel    4 ounces of one of the above soaps  Follow these instructions:  The evening before surgery   1. Wash your hair and body with your regular shampoo and soap. Make sure you rinse the shampoo and soap from your hair and body.   2. Using clean hands, apply about 2 ounces of soap gently on your skin from the neck to your toes. Use on your groin area last. Do not use this soap on your face or head. If you get any soap in your eyes, ears or mouth, rinse right away.   3. Repeat step 2. It is very important to let the soap stay on your skin for at least 1 minute.   4.  Rinse well and dry off using a clean towel.If you feel any tingling, itching or other irritation, rinse right away. It is normal to feel some coolness on the skin after using the antiseptic soap. Your skin may feel a bit dry after the shower, but do not use any lotions, creams or moisturizers. Do not use hair spray or other products in your hair.  5. Dress in freshly washed clothes or pajamas. Use fresh pillowcases and sheets on your bed.      The morning of surgery  1. Wash your hair and body with your regular shampoo and soap. Make sure you rinse the shampoo and soap from your hair and body.   2. Using clean hands, apply about 2 ounces of soap gently on your skin from the neck to your toes. Use on your groin area last. Do not use this soap on your face or head. If you get any soap in your eyes, ears or mouth, rinse right away.   3. Repeat step 2. It is very important to let the soap stay on your skin for at least 1 minute.   4. Rinse well and dry off using a clean towel.If you feel any tingling, itching or other irritation, rinse right away. It is normal to feel some coolness on the skin after using the antiseptic soap. Your skin may feel a bit dry after the shower, but do not use any lotions, creams or moisturizers. Do not use hair spray or other products in your hair.  5. Dress in clean clothes.  If you have any questions about showering or an allergy to CHG soap, please call the Preadmissions Nursing Department at the hospital where you are having your surgery.  [facility] [place] ([place]): [phone]  This phone number will be answered between the hours of 8:00 a.m. and 6:30 p.m. Monday through Friday.    If you have further questions, please utilize [email or web address] to contact us.   If your question concerns the above instructions, contact:  Rach Montenegro RN   Electrophysiology Nurse Coordinator.  [phone]    If your question concerns the schedule/appointment times,  contact:  [facility] Procedure   [phone]

## 2017-06-28 NOTE — MR AVS SNAPSHOT
After Visit Summary   6/28/2017    Barb Hernandez    MRN: [id number]           Patient Information     Date Of Birth          1960        Visit Information        Provider Department      6/28/2017 11:00 AM Husam Doll MD [facility]        Today's Diagnoses     Paroxysmal atrial fibrillation (H)    -  1    On amiodarone therapy          Care Instructions    Medication Changes:   1. Start Pradaxa 150mg twice a day at least 2 weeks prior to procedure date. Wait 24 hours after last dose of Xarelto to start Pradaxa.         You are scheduled for an Atrial Fibrillation Ablation, at [facility], with Dr. Husam Doll. The hospital is located at [address] [place] [address] on [place] the [place]. The phone number is: [phone].  If you need to cancel this procedure, please call [phone].       Date:______  Time:______ (Call may come 30 minutes prior or 30 minutes after)  Pre-Anesthesia Phone Call  You do not need to come to the [facility].        Date: _July 12, 2017______  Time:  10a Lab 1st floor of the [facility] Building  Time: 10:30am 3rd floor of the [facility] for Pulmonary Function     Time: _12 pm__Arrive to the [facility] Waiting Room at the [facility]  Cardiac MRI   1. You will be required to lay flat and follow breath-hold instructions.   2. You will need to remove all metal and answer a safety questionnaire.       Date: August 17, 2017   Time: __5:30 am_Arrive to Unit 3C at the [facility]  Atrial Fibrillation Ablation with Transesophageal Echocardiogram (HESHAM)    1. Please review the attached instructions on showering before your procedure at the end of this letter.  2. Your history and physical will be completed by our nurse practitioner when you arrive.  3. Please do not eat anything for 8 hours prior to your procedure. You may have sips of water up until 2 hours prior to your arrival.  4. The morning of your procedure, you may take your  scheduled medications with a sip of water - continue your blood thinner (Pradaxa).  5. If the HESHAM shows a clot in your heart, the procedure will be canceled.  6. You will receive general anesthesia for this procedure.   7. You will stay in the hospital overnight, and will need a .        Date: __Nov 22, 2017_8a__:   Follow up appointment      Please do not hesitate to utilize MyChart or call us if you have any questions or concerns.    Rach Montenegro RN  Electrophysiology Nurse Coordinator  [phone]    ZE[facility] Borrero Procedure   [phone]                Showering Before Surgery   Your surgeon has asked you to take 2 showers before surgery.  Why is this important?  It is normal for bacteria (germs) to be on your skin. The skin protects us from these germs. When you have surgery, we cut the skin. Sometimes germs get into the cuts and cause infection (illness caused by germs). By following the instructions below and using special soap, you will lower the number of germs on your skin. This decreases your chance of infection.  Special soap  Buy or get 8 ounces of antiseptic surgical soap called 4% CHG. Common name brands of this soap are Hibiclens and Exidine.   You can find it at your local pharmacy, clinic or retail store. If you have trouble, ask your pharmacist to help you find the right substitute.   A note about shaving:  Do not shave within 12 inches of your incision (surgical cut) area for at least 3 days before surgery. Shaving can make small cuts in the skin. This puts you at a higher risk of infection.  Items you will need for each shower:    1 newly washed towel    4 ounces of one of the above soaps  Follow these instructions:  The evening before surgery   1. Wash your hair and body with your regular shampoo and soap. Make sure you rinse the shampoo and soap from your hair and body.   2. Using clean hands, apply about 2 ounces of soap gently on your skin from the neck to your toes. Use  on your groin area last. Do not use this soap on your face or head. If you get any soap in your eyes, ears or mouth, rinse right away.   3. Repeat step 2. It is very important to let the soap stay on your skin for at least 1 minute.   4. Rinse well and dry off using a clean towel.If you feel any tingling, itching or other irritation, rinse right away. It is normal to feel some coolness on the skin after using the antiseptic soap. Your skin may feel a bit dry after the shower, but do not use any lotions, creams or moisturizers. Do not use hair spray or other products in your hair.  5. Dress in freshly washed clothes or pajamas. Use fresh pillowcases and sheets on your bed.      The morning of surgery  1. Wash your hair and body with your regular shampoo and soap. Make sure you rinse the shampoo and soap from your hair and body.   2. Using clean hands, apply about 2 ounces of soap gently on your skin from the neck to your toes. Use on your groin area last. Do not use this soap on your face or head. If you get any soap in your eyes, ears or mouth, rinse right away.   3. Repeat step 2. It is very important to let the soap stay on your skin for at least 1 minute.   4. Rinse well and dry off using a clean towel.If you feel any tingling, itching or other irritation, rinse right away. It is normal to feel some coolness on the skin after using the antiseptic soap. Your skin may feel a bit dry after the shower, but do not use any lotions, creams or moisturizers. Do not use hair spray or other products in your hair.  5. Dress in clean clothes.  If you have any questions about showering or an allergy to CHG soap, please call the Preadmissions Nursing Department at the hospital where you are having your surgery.  [facility], [place] ([place]): [phone]  This phone number will be answered between the hours of 8:00 a.m. and 6:30 p.m. Monday through Friday.    If you have further questions, please  utilize [employer] to contact us.   If your question concerns the above instructions, contact:  Rach Montenegro RN   Electrophysiology Nurse Coordinator.  [phone]    If your question concerns the schedule/appointment times, contact:  [facility] Procedure   [phone]          Follow-ups after your visit        Your next 10 appointments already scheduled     Jul 11, 2017  9:00 AM CDT   Lab with  LAB   [facility] Lab ([facility])    [address]  [address] [facility] [id number] [phone]            Jul 11, 2017  9:30 AM CDT   FULL PULMONARY FUNCTION with  PFL A   [facility] Pulmonary Function Testing ([facility])    [address]  [address] [facility] [id number] [phone]            Jul 12, 2017 12:30 PM CDT   MR CHEST W/O & W CONTRAST ANGIOGRAM with UUMR4   [facility] [place] ([facility])    [address] [id number] [phone]           Take your medicines as usual, unless your doctor tells you not to. Bring a list of your current medicines to your exam (including vitamins, minerals and over-the-counter drugs).  You will be given intravenous contrast for this exam. To prepare:   The day before your exam, drink extra fluids at least six 8-ounce glasses (unless your doctor tells you to restrict your fluids).   Have a blood test (creatinine test) within 30 days of your exam. Go to your clinic or Diagnostic Imaging Department for this test.  The MRI machine uses a strong magnet. Please wear clothes without metal (snaps, zippers). A sweatsuit works well, or we may give you a hospital gown.  Please remove any body piercings and hair extensions before you arrive. You will also remove watches, jewelry, hairpins, wallets, dentures, partial dental plates and hearing aids. You may wear contact lenses, and you may be able to wear your rings. We have  a safe place to keep your personal items, but it is safer to leave them at home.   **IMPORTANT** THE INSTRUCTIONS BELOW ARE ONLY FOR THOSE PATIENTS WHO HAVE BEEN TOLD THEY WILL RECEIVE SEDATION OR GENERAL ANESTHESIA DURING THEIR MRI PROCEDURE:  IF YOU WILL RECEIVE SEDATION (take medicine to help you relax during your exam):   You must get the medicine from your doctor before you arrive. Bring the medicine to the exam. Do not take it at home.   Arrive one hour early. Bring someone who can take you home after the test. Your medicine will make you sleepy. After the exam, you may not drive, take a bus or take a taxi by yourself.   No eating 8 hours before your exam. You may have clear liquids up until 4 hours before your exam. (Clear liquids include water, clear tea, black coffee and fruit juice without pulp.)  IF YOU WILL RECEIVE ANESTHESIA (be asleep for your exam):   Arrive 1 1/2 hours early. Bring someone who can take you home after the test. You may not drive, take a bus or take a taxi by yourself.   No eating 8 hours before your exam. You may have clear liquids up until 4 hours before your exam. (Clear liquids include water, clear tea, black coffee and fruit juice without pulp.)  Please call the Imaging Department at your exam site with any questions.            Jul 12, 2017  1:00 PM CDT   MR MYOCARDIUM W CONTRAST with UUMR4, UU CV MR NURSE   [facility] [place], MRI ([facility])    [address] [address] [phone]           Take your medicines as usual, unless your doctor tells you not to. Bring a list of your current medicines to your exam (including vitamins, minerals and over-the-counter drugs).  You will be given intravenous contrast for this exam. To prepare:   The day before your exam, drink extra fluids at least six 8-ounce glasses (unless your doctor tells you to restrict your fluids).   Have a blood test (creatinine test) within  30 days of your exam. Go to your clinic or Diagnostic Imaging Department for this test.  The MRI machine uses a strong magnet. Please wear clothes without metal (snaps, zippers). A sweatsuit works well, or we may give you a hospital gown.  Please remove any body piercings and hair extensions before you arrive. You will also remove watches, jewelry, hairpins, wallets, dentures, partial dental plates and hearing aids. You may wear contact lenses, and you may be able to wear your rings. We have a safe place to keep your personal items, but it is safer to leave them at home.   **IMPORTANT** THE INSTRUCTIONS BELOW ARE ONLY FOR THOSE PATIENTS WHO HAVE BEEN TOLD THEY WILL RECEIVE SEDATION OR GENERAL ANESTHESIA DURING THEIR MRI PROCEDURE:  IF YOU WILL RECEIVE SEDATION (take medicine to help you relax during your exam):   You must get the medicine from your doctor before you arrive. Bring the medicine to the exam. Do not take it at home.   Arrive one hour early. Bring someone who can take you home after the test. Your medicine will make you sleepy. After the exam, you may not drive, take a bus or take a taxi by yourself.   No eating 8 hours before your exam. You may have clear liquids up until 4 hours before your exam. (Clear liquids include water, clear tea, black coffee and fruit juice without pulp.)  IF YOU WILL RECEIVE ANESTHESIA (be asleep for your exam):   Arrive 1 1/2 hours early. Bring someone who can take you home after the test. You may not drive, take a bus or take a taxi by yourself.   No eating 8 hours before your exam. You may have clear liquids up until 4 hours before your exam. (Clear liquids include water, clear tea, black coffee and fruit juice without pulp.)  Please call the Imaging Department at your exam site with any questions.            Aug 10, 2017  7:00 AM CDT   (Arrive by 6:45 AM)   PAC PHONE RN ASSESSMENT with Demetria Pac Rn   [facility] ([facility]  [place])    [address]  4th Floor  [facility] [address] [phone]           Note: this is not an onsite visit; there is no need to come to the facility.            Aug 17, 2017   Procedure with GENERIC ANESTHESIA PROVIDER   Sanket JACKSON, Same Day Surgery (--)    [address] [address]   [phone]            Aug 17, 2017  8:00 AM CDT   Ep 90 Minute with UUHCVR1   [facility] ([facility])    [address] [address]   [phone]            Aug 17, 2017  8:00 AM CDT   Ech Cesar with [id number]   [facility], [place],  Echocardiography ([facility])    [address] [address]   [phone]           1.  Please bring or wear a comfortable two-piece outfit. 2.  Arrival time: -   [facility]:  arrive 75 minutes prior to examination time. -   [facility]:  arrive 90 minutes prior to examination time. -   [facility]:   arrive 15 minutes prior to examination time. 3.  Plan to have someone here to drive you home after the test. -   Someone should stay with you for 6 hours after your test. 4.  No food or drink: -   6 hours before the test 5.  If you take antacids or water pills (diuretics): Do not take them until after your test. You may take blood pressure medicine with a few sips of water. 6.  If you have diabetes: -   Morning slots preferred -   If you take insulin, call your diabetes care team. Ask if you should take a   dose the morning of your test. -   If you take diabetes medicine by mouth, don't take it on the morning of your test. Bring it with you to take after the test. (If you have questions, call your diabetes care team.) 7.  Bring a list of any medicines you are taking. 8.  Do not drive for 24 hours after the test. 9.   A responsible adult must stay with you for 24 hours after the test.  10.  For any questions that cannot be answered, please  contact the ordering physician              Future tests that were ordered for you today     Open Future Orders        Priority Expected Expires Ordered    TSH with free T4 reflex STAT 7/11/2017 1/14/2018 6/28/2017    Transesophageal Echocardiogram Routine  6/28/2018 6/28/2017    General PFT Lab (Please always keep checked) Routine  6/28/2018 6/28/2017    Pulmonary Function Test Routine  6/28/2018 6/28/2017    Hepatic panel STAT 7/11/2017 1/14/2018 6/28/2017    MRI Cardiac w/contrast Routine 6/29/2017 6/29/2018 6/28/2017    MRA Angiogram chest w & w/o contrast Routine  6/28/2018 6/28/2017    Pulmonary Function Test Routine  6/28/2018 6/28/2017    EP Ablation/ EP Studies Routine  6/28/2018 6/28/2017            Who to contact     If you have questions or need follow up information about today's clinic visit or your schedule please contact [facility] directly at [phone].  Normal or non-critical lab and imaging results will be communicated to you by [employer], letter or phone within 4 business days after the clinic has received the results. If you do not hear from us within 7 days, please contact the clinic through [employer] or phone. If you have a critical or abnormal lab result, we will notify you by phone as soon as possible.  Submit refill requests through [employer] or call your pharmacy and they will forward the refill request to us. Please allow 3 business days for your refill to be completed.          Additional Information About Your Visit        [employer] Information     [employer] gives you secure access to your electronic health record. If you see a primary care provider, you can also send messages to your care team and make appointments. If you have questions, please call your primary care clinic.  If you do not have a primary care provider, please call [phone] and they will assist you.        Care EveryWhere ID     This is your Care EveryWhere ID. This could be used by other organizations to access  "your [facility] medical records  [id number]        Your Vitals Were     Pulse Height Pulse Oximetry BMI (Body Mass Index)          61 1.753 m (5' 9\") 97% 35.8 kg/m2         Blood Pressure from Last 3 Encounters:   06/28/17 129/77   06/27/17 147/84   06/26/17 (!) 134/96    Weight from Last 3 Encounters:   06/28/17 110 kg (242 lb 6.4 oz)   06/26/17 107.5 kg (237 lb)                 Today's Medication Changes          These changes are accurate as of: 6/28/17  1:17 PM.  If you have any questions, ask your nurse or doctor.               Start taking these medicines.        Dose/Directions    dabigatran ANTICOAGULANT 150 MG capsule   Commonly known as:  PRADAXA ANTICOAGULANT   Used for:  Paroxysmal atrial fibrillation (H)        Dose:  150 mg   Take 1 capsule (150 mg) by mouth 2 times daily Store in original 's bottle or blister pack; use within 120 days of opening.   Quantity:  60 capsule   Refills:  5         Stop taking these medicines if you haven't already. Please contact your care team if you have questions.     XARELTO PO                Where to get your medicines      Call your pharmacy to confirm that your medication is ready for pickup. It may take up to 24 hours for them to receive the prescription. If the prescription is not ready within 3 business days, please contact your clinic or your provider.     We will let you know when these medications are ready. If you don't hear back within 3 business days, please contact us.     dabigatran ANTICOAGULANT 150 MG capsule                Primary Care Provider Office Phone # Fax #    Mindy Castañeda [phone]       [facility] [address] [facility] [address]        Equal Access to Services     [facility]: Hadarnol Gustafson, ras leggett, chantale knox. So [facility] [phone].    ATENCIÓN: Si habla español, tiene a vargas disposición servicios " indira de asistencia lingüística. Ernst tomlinson [phone].    We comply with applicable federal civil rights laws and [place] laws. We do not discriminate on the basis of race, color, national origin, age, disability sex, sexual orientation or gender identity.            Thank you!     Thank you for choosing [facility]  for your care. Our goal is always to provide you with excellent care. Hearing back from our patients is one way we can continue to improve our services. Please take a few minutes to complete the written survey that you may receive in the mail after your visit with us. Thank you!             Your Updated Medication List - Protect others around you: Learn how to safely use, store and throw away your medicines at www.disposemymeds.org.          This list is accurate as of: 6/28/17  1:17 PM.  Always use your most recent med list.                   Brand Name Dispense Instructions for use Diagnosis    * AMIODARONE HCL PO      Take 400 mg by mouth daily        * AMIODARONE HCL PO   Start taking on:  7/7/2017      Take 200 mg by mouth daily        BUSPIRONE HCL PO      Take 10 mg by mouth daily        dabigatran ANTICOAGULANT 150 MG capsule    PRADAXA ANTICOAGULANT    60 capsule    Take 1 capsule (150 mg) by mouth 2 times daily Store in original 's bottle or blister pack; use within 120 days of opening.    Paroxysmal atrial fibrillation (H)       HYDROCHLOROTHIAZIDE PO      Take 25 mg by mouth daily        LOSARTAN POTASSIUM PO      Take 50 mg by mouth        magnesium sulfate Gran granules    EPSOM SALT     Apply topically 2 times daily as needed for skin care        METOPROLOL SUCCINATE ER PO      Take 50 mg by mouth        PRAVASTATIN SODIUM PO      Take 20 mg by mouth daily        senna-docusate 8.6-50 MG per tablet    SENOKOT-S;PERICOLACE     Take 1 tablet by mouth daily as needed for constipation        venlafaxine 150 MG Tb24 24 hr tablet    EFFEXOR-ER     Take 150 mg by mouth  daily (with breakfast)        * Notice:  This list has 2 medication(s) that are the same as other medications prescribed for you. Read the directions carefully, and ask your doctor or other care provider to review them with you.

## 2017-06-28 NOTE — PROGRESS NOTES
CARDIAC ELECTROPHYSIOLOGY NEW PATIENT VISIT    HPI:  Ms Hernandez is a 58 yo female with symptomatic, paroxysmal atrial fibrillation referred for consideration of ablation.  She was diagnosed with paroxysmal atrial fibrillation in 2015 in the setting of an acute illness but even after recovering she continued to have paroxysms of atrial fibrillation prompting emergency department visits, especially over the past year.  Her symptoms include palpitations, dyspnea, chest discomfort, and weakness.  She has never undergone electrical cardioversion.  She was evaluated at [facility] where she was started on flecainide 50 mg BID but she developed RVR during her stress test and, because the risk of flecainide toxicity could not be excluded, it was discontinued.  Rate control with metoprolol & diltiazem has been impeded by lightheadedness even to the point of ED visits.  Three weeks ago she was started on amiodarone 400 mg with the intention of performing ablative peripheral vein isolation but she has continued to have episodic AF.  She says that she once again presented to an emergency department 1.5 weeks ago with AF but converted to sinus rhythm after a few doses of metoprolol.  Two days ago she saw one of our NPs, Narda Benavidez, to transfer her care to the [place] because she and her  have been pleased with her husbands medical care by the gastroenterology group here.  She was in AF in the 80s but had been symptomatic for the 4 days prior so she was scheduled to undergo DCCV yesterday.  When she presented, however, she was in sinus rhythm.    Her medical history is notable for hypertension, hyperlipidemia, migraines, depression & anxiety.    Current cardiovascular medications include Amiodarone 400 mg daily, rivaroxaban, losartan 50 mg daily, metoprolol XL 50 mg daily, HCTZ 25 mg daily, pravastatin 20 mg daily.  She has tolerated rivaroxaban without significant bleeding issues.    A complete review of  "systems is negative except as noted above in the HPI.      Past Medical History:   Diagnosis Date     Anxiety      Atrial fibrillation (H)      Constipation      Depression      Fibromuscular dysplasia (H)      HLD (hyperlipidemia)      HTN (hypertension)      Migraines      Palpitation      PVC's (premature ventricular contractions)      Past Surgical History:   Procedure Laterality Date     AS PTA RENAL/VISCERAL ARTERY Right      C/SECTION, CLASSICAL       FUSION CERVICAL ANTERIOR ONE LEVEL      c3-C4     HYSTERECTOMY       TONSILLECTOMY       Family History   Problem Relation Age of Onset     CANCER Mother      Asthma Mother      Heart Murmur Sister      prolapse     Depression Sister      Coronary Artery Disease Father      Social History     Social History     Marital status:      Spouse name: N/A     Number of children: N/A     Years of education: N/A     Occupational History     Not on file.     Social History Main Topics     Smoking status: Never Smoker     Smokeless tobacco: Not on file     Alcohol use Yes      Comment: occasional.      Drug use: No     Sexual activity: Yes     Other Topics Concern     Not on file     Social History Narrative     No narrative on file     No Known Allergies       Examination:  /77 (BP Location: Left arm, Patient Position: Chair, Cuff Size: Adult Large)  Pulse 61  Ht 1.753 m (5' 9\")  Wt 110 kg (242 lb 6.4 oz)  SpO2 97%  BMI 35.8 kg/m2  /77 (BP Location: Left arm, Patient Position: Chair, Cuff Size: Adult Large)  Pulse 61  Ht 1.753 m (5' 9\")  Wt 110 kg (242 lb 6.4 oz)  SpO2 97%  BMI 35.8 kg/m2  General appearance:  Comfortable-appearing female with unlabored breathing.  Neuro:  Alert & fully oriented.  Eyes:  Anicteric sclerae.  Conjugative gaze.  Neck:  Supple.  JVP is not elevated.  CV:  RRR with normal s1 and s2 and no murmur or rub.  Warm extremities with mild non-pitting edema.  Lungs:  Clear on ascultation.  Abdomen:  Soft.  Not tender or " distended.  Skin:  No petechiae/ecchymoses.  Not jaundiced.      Labs, Imaging & Procedures were reviewed.  An echocardiogram ([facility]) performed in 5/2017 showed an LV EF of 65%, normal LV and RV function, mild-moderate MR, and no significant valvular disease.  Labs in 5/2017 showed a hgb of 13.9 and Plt count of 244.      Impression & Plan:  Ms Hernandez is a 56 yo female with symptomatic, paroxysmal atrial fibrillation despite treatment with amiodarone (although admittedly she has not yet been loaded), intolerant of negative chronotropic agents (lightheadedness), and unable to use class Ic antiarrhythmics because she could not undergo a stress test (RVR) to exclude flecainide toxicity who transferred her care to the [place] a few days ago and was referred for consideration of PVI ablation.      Stroke prophylaxis:  CHADSVasc score is 2 for hypertension and female gender.  She is anticoagulated with rivaroxaban, which she has tolerated without major bleeding issues.  We have switched her to dabigatran for use in the silviano-procedural period as it has a reversal agent available, but 3 months post-ablation she may resume any anticoagulant of her choice.    Rate control:  Continue metoprolol XL at 50 mg daily.  She is intolerant of higher doses of BB and of CCBs- she became lightheaded and presyncopal.    Rhythm control, indicated because she is symptomatic even when rate controlled:  We discussed options for rhythm control including electrical cardioversion (a poor choice because she frequently flips in and out), antiarrhythmic therapy (although she is having symptomatic AF even on amiodarone), and ablation.  She would like to pursue an ablation as previously recommended at [facility].    Pulmonary function tests    Risk factor modification:  Referred for sleep apnea testing.    She is scheduled for PVI on 11/22/2017.  I discussed the patient with Dr. Husam Doll.    Franky Prieto MD  Cardiovascular  disease fellow    EP STAFF NOTE  Patient seen and examined and management plan personally reviewed with the patient. I agree with the note above by the CV/EP fellow.  Russel chopra has symptomatic PAF not responding t flecainide, now on amio aimed for short ter use and schedule for abation at [facility]. We wet over her recent history. We expressed to the patient that what as recommended to her was appropriate an we agree with their management, an tat she should proceed wit ablation as scheduled. She preferred transferring her care to the  as her  also gets his care here and thy wanted to consolidate in one place.  We went over the success rate of the procedure ad the potential complications that include pericardial effusion and tamponade, CVA, vascular complications, bleeding, need for PPM, esophageal fistula and PV stenosis. She expressed her understanding and agreement to proceed with afib ablation.  Husam Doll MD [facility]  Cardiology - Electrophysiology

## 2017-06-28 NOTE — LETTER
6/28/2017      RE: Barb Hernandez  [address]  [place] [address]       Dear Colleague,    Thank you for the opportunity to participate in the care of your patient, Barb Hernandez, at the [facility] at [facility]. Please see a copy of my visit note below.    CARDIAC ELECTROPHYSIOLOGY NEW PATIENT VISIT    HPI:  Ms Hernandez is a 58 yo female with symptomatic, paroxysmal atrial fibrillation referred for consideration of ablation.  She was diagnosed with paroxysmal atrial fibrillation in 2015 in the setting of an acute illness but even after recovering she continued to have paroxysms of atrial fibrillation prompting emergency department visits, especially over the past year.  Her symptoms include palpitations, dyspnea, chest discomfort, and weakness.  She has never undergone electrical cardioversion.  She was evaluated at [facility] where she was started on flecainide 50 mg BID but she developed RVR during her stress test and, because the risk of flecainide toxicity could not be excluded, it was discontinued.  Rate control with metoprolol & diltiazem has been impeded by lightheadedness even to the point of ED visits.  Three weeks ago she was started on amiodarone 400 mg with the intention of performing ablative peripheral vein isolation but she has continued to have episodic AF.  She says that she once again presented to an emergency department 1.5 weeks ago with AF but converted to sinus rhythm after a few doses of metoprolol.  Two days ago she saw one of our NPs, Narda Benavidez, to transfer her care to the [place] because she and her  have been pleased with her husbands medical care by the gastroenterology group here.  She was in AF in the 80s but had been symptomatic for the 4 days prior so she was scheduled to undergo DCCV yesterday.  When she presented, however, she was in sinus rhythm.    Her medical history is notable for hypertension,  "hyperlipidemia, migraines, depression & anxiety.    Current cardiovascular medications include Amiodarone 400 mg daily, rivaroxaban, losartan 50 mg daily, metoprolol XL 50 mg daily, HCTZ 25 mg daily, pravastatin 20 mg daily.  She has tolerated rivaroxaban without significant bleeding issues.    A complete review of systems is negative except as noted above in the HPI.      Past Medical History:   Diagnosis Date     Anxiety      Atrial fibrillation (H)      Constipation      Depression      Fibromuscular dysplasia (H)      HLD (hyperlipidemia)      HTN (hypertension)      Migraines      Palpitation      PVC's (premature ventricular contractions)      Past Surgical History:   Procedure Laterality Date     AS PTA RENAL/VISCERAL ARTERY Right      C/SECTION, CLASSICAL       FUSION CERVICAL ANTERIOR ONE LEVEL      c3-C4     HYSTERECTOMY       TONSILLECTOMY       Family History   Problem Relation Age of Onset     CANCER Mother      Asthma Mother      Heart Murmur Sister      prolapse     Depression Sister      Coronary Artery Disease Father      Social History     Social History     Marital status:      Spouse name: N/A     Number of children: N/A     Years of education: N/A     Occupational History     Not on file.     Social History Main Topics     Smoking status: Never Smoker     Smokeless tobacco: Not on file     Alcohol use Yes      Comment: occasional.      Drug use: No     Sexual activity: Yes     Other Topics Concern     Not on file     Social History Narrative     No narrative on file     No Known Allergies       Examination:  /77 (BP Location: Left arm, Patient Position: Chair, Cuff Size: Adult Large)  Pulse 61  Ht 1.753 m (5' 9\")  Wt 110 kg (242 lb 6.4 oz)  SpO2 97%  BMI 35.8 kg/m2  /77 (BP Location: Left arm, Patient Position: Chair, Cuff Size: Adult Large)  Pulse 61  Ht 1.753 m (5' 9\")  Wt 110 kg (242 lb 6.4 oz)  SpO2 97%  BMI 35.8 kg/m2  General appearance:  Comfortable-appearing " female with unlabored breathing.  Neuro:  Alert & fully oriented.  Eyes:  Anicteric sclerae.  Conjugative gaze.  Neck:  Supple.  JVP is not elevated.  CV:  RRR with normal s1 and s2 and no murmur or rub.  Warm extremities with mild non-pitting edema.  Lungs:  Clear on ascultation.  Abdomen:  Soft.  Not tender or distended.  Skin:  No petechiae/ecchymoses.  Not jaundiced.      Labs, Imaging & Procedures were reviewed.  An echocardiogram ([facility]) performed in 5/2017 showed an LV EF of 65%, normal LV and RV function, mild-moderate MR, and no significant valvular disease.  Labs in 5/2017 showed a hgb of 13.9 and Plt count of 244.      Impression & Plan:  Ms Hernandez is a 58 yo female with symptomatic, paroxysmal atrial fibrillation despite treatment with amiodarone (although admittedly she has not yet been loaded), intolerant of negative chronotropic agents (lightheadedness), and unable to use class Ic antiarrhythmics because she could not undergo a stress test (RVR) to exclude flecainide toxicity who transferred her care to the [place] a few days ago and was referred for consideration of PVI ablation.      Stroke prophylaxis:  CHADSVasc score is 2 for hypertension and female gender.  She is anticoagulated with rivaroxaban, which she has tolerated without major bleeding issues.  We have switched her to dabigatran for use in the silviano-procedural period as it has a reversal agent available, but 3 months post-ablation she may resume any anticoagulant of her choice.    Rate control:  Continue metoprolol XL at 50 mg daily.  She is intolerant of higher doses of BB and of CCBs- she became lightheaded and presyncopal.    Rhythm control, indicated because she is symptomatic even when rate controlled:  We discussed options for rhythm control including electrical cardioversion (a poor choice because she frequently flips in and out), antiarrhythmic therapy (although she is having symptomatic AF even on amiodarone), and  ablation.  She would like to pursue an ablation as previously recommended at [facility].    Pulmonary function tests    Risk factor modification:  Referred for sleep apnea testing.    She is scheduled for PVI on 11/22/2017.  I discussed the patient with Dr. Husam Doll.    Franky Prieto MD  Cardiovascular disease fellow    EP STAFF NOTE  Patient seen and examined and management plan personally reviewed with the patient. I agree with the note above by the CV/EP fellow.  Russel chopra has symptomatic PAF not responding t flecainide, now on amio aimed for short ter use and schedule for abation at [facility]. We wet over her recent history. We expressed to the patient that what as recommended to her was appropriate an we agree with their management, an tat she should proceed wit ablation as scheduled. She preferred transferring her care to the  as her  also gets his care here and thy wanted to consolidate in one place.  We went over the success rate of the procedure ad the potential complications that include pericardial effusion and tamponade, CVA, vascular complications, bleeding, need for PPM, esophageal fistula and PV stenosis. She expressed her understanding and agreement to proceed with afib ablation.  Husam Doll MD [facility]  Cardiology - Electrophysiology

## 2017-06-28 NOTE — NURSING NOTE
Chief Complaint   Patient presents with     Follow Up For     AF ablation consult. Recent DCCV     Vitals were taken and medications were reconciled.     Galo Mendiola, AMRITA  11:02 AM

## 2017-07-11 ENCOUNTER — TELEPHONE (OUTPATIENT)
Dept: CARDIOLOGY | Facility: CLINIC | Age: 57
End: 2017-07-11

## 2017-07-11 NOTE — TELEPHONE ENCOUNTER
Patient called stating she is having side effects from her medication, AMIODARONE HCL PO. She said she is taking 200 mg a day and is seeing dots in front of her eyes and has bad headaches.  She said the black dots started yesterday and have not gone away.  She knows she is under a lot of stress, her  went in to emergency heart surgery this weekend and she has not had much sleep. Her is /70 and she knows she is not in Afib.  I told Barb I would pass the message on to Rach Montenegro RN.  Barb said it is okay to leave a message at  # [phone]

## 2017-07-12 ENCOUNTER — HOSPITAL ENCOUNTER (OUTPATIENT)
Dept: MRI IMAGING | Facility: CLINIC | Age: 57
Discharge: HOME | End: 2017-07-12
Attending: INTERNAL MEDICINE | Admitting: INTERNAL MEDICINE
Payer: COMMERCIAL

## 2017-07-12 ENCOUNTER — HOSPITAL ENCOUNTER (OUTPATIENT)
Dept: MRI IMAGING | Facility: CLINIC | Age: 57
End: 2017-07-12
Attending: INTERNAL MEDICINE
Payer: COMMERCIAL

## 2017-07-12 DIAGNOSIS — I48.0 PAROXYSMAL ATRIAL FIBRILLATION (H): ICD-10-CM

## 2017-07-12 DIAGNOSIS — Z79.899 ON AMIODARONE THERAPY: ICD-10-CM

## 2017-07-12 LAB
ALBUMIN SERPL-MCNC: 3.8 G/DL (ref 3.4–5)
ALP SERPL-CCNC: 97 U/L (ref 40–150)
ALT SERPL W P-5'-P-CCNC: 20 U/L (ref 0–50)
AST SERPL W P-5'-P-CCNC: 15 U/L (ref 0–45)
BILIRUB DIRECT SERPL-MCNC: <0.1 MG/DL (ref 0–0.2)
BILIRUB SERPL-MCNC: 0.6 MG/DL (ref 0.2–1.3)
PROT SERPL-MCNC: 7.4 G/DL (ref 6.8–8.8)
TSH SERPL DL<=0.005 MIU/L-ACNC: 1.52 MU/L (ref 0.4–4)

## 2017-07-12 PROCEDURE — 75561 CARDIAC MRI FOR MORPH W/DYE: CPT

## 2017-07-12 PROCEDURE — A9585 GADOBUTROL INJECTION: HCPCS | Performed by: INTERNAL MEDICINE

## 2017-07-12 PROCEDURE — 71555 MRI ANGIO CHEST W OR W/O DYE: CPT

## 2017-07-12 PROCEDURE — 75561 CARDIAC MRI FOR MORPH W/DYE: CPT | Mod: 26 | Performed by: INTERNAL MEDICINE

## 2017-07-12 PROCEDURE — 25000128 H RX IP 250 OP 636: Performed by: INTERNAL MEDICINE

## 2017-07-12 PROCEDURE — 71555 MRI ANGIO CHEST W OR W/O DYE: CPT | Mod: 26 | Performed by: INTERNAL MEDICINE

## 2017-07-12 RX ORDER — GADOBUTROL 604.72 MG/ML
10 INJECTION INTRAVENOUS ONCE
Status: COMPLETED | OUTPATIENT
Start: 2017-07-12 | End: 2017-07-12

## 2017-07-12 RX ADMIN — GADOBUTROL 10 ML: 604.72 INJECTION INTRAVENOUS at 14:12

## 2017-07-12 NOTE — TELEPHONE ENCOUNTER
Per Ana Bella, NP:    The symptoms sounds possibly like a migraine rather then amiodarone considering her symptoms only recently started but has been on amio for ~1 mo. If her symptoms persist for 1-2 week then she should let us know. Meanwhile, should should treat her headaches.   Thanks,   LVW     Sent patient a [employer] message with above information.

## 2017-07-13 LAB
DLCOUNC-%PRED-PRE: 106 %
DLCOUNC-PRE: 25.97 ML/MIN/MMHG
DLCOUNC-PRED: 24.4 ML/MIN/MMHG
ERV-%PRED-PRE: 9 %
ERV-PRE: 0.06 L
ERV-PRED: 0.68 L
EXPTIME-PRE: 7.59 SEC
FEF2575-%PRED-PRE: 98 %
FEF2575-PRE: 2.75 L/SEC
FEF2575-PRED: 2.8 L/SEC
FEFMAX-%PRED-PRE: 104 %
FEFMAX-PRE: 7.75 L/SEC
FEFMAX-PRED: 7.41 L/SEC
FEV1-%PRED-PRE: 83 %
FEV1-PRE: 2.61 L
FEV1FEV6-PRE: 83 %
FEV1FEV6-PRED: 81 %
FEV1FVC-PRE: 83 %
FEV1FVC-PRED: 79 %
FEV1SVC-PRE: 88 %
FEV1SVC-PRED: 77 %
FIFMAX-PRE: 5.01 L/SEC
FVC-%PRED-PRE: 79 %
FVC-PRE: 3.16 L
FVC-PRED: 3.99 L
IC-%PRED-PRE: 85 %
IC-PRE: 2.9 L
IC-PRED: 3.39 L
VA-%PRED-PRE: 81 %
VA-PRE: 4.87 L
VC-%PRED-PRE: 72 %
VC-PRE: 2.96 L
VC-PRED: 4.07 L

## 2017-08-10 ENCOUNTER — APPOINTMENT (OUTPATIENT)
Dept: SURGERY | Facility: CLINIC | Age: 57
End: 2017-08-10

## 2017-08-17 ENCOUNTER — HOSPITAL ENCOUNTER (OUTPATIENT)
Facility: CLINIC | Age: 57
Discharge: HOME | End: 2017-08-18
Attending: INTERNAL MEDICINE | Admitting: INTERNAL MEDICINE
Payer: COMMERCIAL

## 2017-08-17 ENCOUNTER — APPOINTMENT (OUTPATIENT)
Dept: CARDIOLOGY | Facility: CLINIC | Age: 57
End: 2017-08-17
Attending: INTERNAL MEDICINE
Payer: COMMERCIAL

## 2017-08-17 ENCOUNTER — ANESTHESIA (OUTPATIENT)
Dept: SURGERY | Facility: CLINIC | Age: 57
End: 2017-08-17
Payer: COMMERCIAL

## 2017-08-17 ENCOUNTER — HOSPITAL ENCOUNTER (OUTPATIENT)
Dept: CARDIOLOGY | Facility: CLINIC | Age: 57
End: 2017-08-17
Attending: INTERNAL MEDICINE | Admitting: INTERNAL MEDICINE
Payer: COMMERCIAL

## 2017-08-17 ENCOUNTER — ANESTHESIA EVENT (OUTPATIENT)
Dept: SURGERY | Facility: CLINIC | Age: 57
End: 2017-08-17
Payer: COMMERCIAL

## 2017-08-17 ENCOUNTER — SURGERY (OUTPATIENT)
Age: 57
End: 2017-08-17

## 2017-08-17 DIAGNOSIS — I48.0 PAROXYSMAL ATRIAL FIBRILLATION (H): ICD-10-CM

## 2017-08-17 LAB
ABO + RH BLD: NORMAL
ABO + RH BLD: NORMAL
ANION GAP SERPL CALCULATED.3IONS-SCNC: 7 MMOL/L (ref 3–14)
BLD GP AB SCN SERPL QL: NORMAL
BLOOD BANK CMNT PATIENT-IMP: NORMAL
BUN SERPL-MCNC: 11 MG/DL (ref 7–30)
CALCIUM SERPL-MCNC: 9 MG/DL (ref 8.5–10.1)
CHLORIDE SERPL-SCNC: 104 MMOL/L (ref 94–109)
CO2 SERPL-SCNC: 28 MMOL/L (ref 20–32)
CREAT SERPL-MCNC: 0.84 MG/DL (ref 0.52–1.04)
ERYTHROCYTE [DISTWIDTH] IN BLOOD BY AUTOMATED COUNT: 13.5 % (ref 10–15)
GFR SERPL CREATININE-BSD FRML MDRD: 71 ML/MIN/1.7M2
GLUCOSE SERPL-MCNC: 98 MG/DL (ref 70–99)
HCG SERPL QL: NEGATIVE
HCT VFR BLD AUTO: 40.2 % (ref 35–47)
HGB BLD-MCNC: 13 G/DL (ref 11.7–15.7)
INTERPRETATION ECG - MUSE: NORMAL
KCT BLD-ACNC: 184 SEC (ref 105–167)
KCT BLD-ACNC: 294 SEC (ref 105–167)
KCT BLD-ACNC: 314 SEC (ref 105–167)
KCT BLD-ACNC: 318 SEC (ref 105–167)
KCT BLD-ACNC: 330 SEC (ref 105–167)
KCT BLD-ACNC: 416 SEC (ref 105–167)
KCT BLD-ACNC: 416 SEC (ref 105–167)
MCH RBC QN AUTO: 30.4 PG (ref 26.5–33)
MCHC RBC AUTO-ENTMCNC: 32.3 G/DL (ref 31.5–36.5)
MCV RBC AUTO: 94 FL (ref 78–100)
PLATELET # BLD AUTO: 222 10E9/L (ref 150–450)
POTASSIUM SERPL-SCNC: 3.7 MMOL/L (ref 3.4–5.3)
RBC # BLD AUTO: 4.28 10E12/L (ref 3.8–5.2)
SODIUM SERPL-SCNC: 139 MMOL/L (ref 133–144)
SPECIMEN EXP DATE BLD: NORMAL
WBC # BLD AUTO: 6.7 10E9/L (ref 4–11)

## 2017-08-17 PROCEDURE — 25000132 ZZH RX MED GY IP 250 OP 250 PS 637: Performed by: NURSE PRACTITIONER

## 2017-08-17 PROCEDURE — 40000065 ZZH STATISTIC EKG NON-CHARGEABLE

## 2017-08-17 PROCEDURE — C1759 CATH, INTRA ECHOCARDIOGRAPHY: HCPCS

## 2017-08-17 PROCEDURE — 86850 RBC ANTIBODY SCREEN: CPT | Performed by: ANESTHESIOLOGY

## 2017-08-17 PROCEDURE — 25000128 H RX IP 250 OP 636: Performed by: NURSE ANESTHETIST, CERTIFIED REGISTERED

## 2017-08-17 PROCEDURE — 71000014 ZZH RECOVERY PHASE 1 LEVEL 2 FIRST HR: Performed by: INTERNAL MEDICINE

## 2017-08-17 PROCEDURE — C1894 INTRO/SHEATH, NON-LASER: HCPCS

## 2017-08-17 PROCEDURE — 93662 INTRACARDIAC ECG (ICE): CPT

## 2017-08-17 PROCEDURE — 93613 INTRACARDIAC EPHYS 3D MAPG: CPT

## 2017-08-17 PROCEDURE — 85347 COAGULATION TIME ACTIVATED: CPT

## 2017-08-17 PROCEDURE — 93656 COMPRE EP EVAL ABLTJ ATR FIB: CPT

## 2017-08-17 PROCEDURE — 3E033KZ INTRODUCTION OF OTHER DIAGNOSTIC SUBSTANCE INTO PERIPHERAL VEIN, PERCUTANEOUS APPROACH: ICD-10-PCS | Performed by: INTERNAL MEDICINE

## 2017-08-17 PROCEDURE — 27210796 ZZH PAD EXTRNAL REFRENCE CARDIAC MAPPING CR14

## 2017-08-17 PROCEDURE — 93325 DOPPLER ECHO COLOR FLOW MAPG: CPT | Mod: 26 | Performed by: INTERNAL MEDICINE

## 2017-08-17 PROCEDURE — 93623 PRGRMD STIMJ&PACG IV RX NFS: CPT | Mod: 26 | Performed by: INTERNAL MEDICINE

## 2017-08-17 PROCEDURE — 25000125 ZZHC RX 250: Performed by: INTERNAL MEDICINE

## 2017-08-17 PROCEDURE — 93662 INTRACARDIAC ECG (ICE): CPT | Mod: 26 | Performed by: INTERNAL MEDICINE

## 2017-08-17 PROCEDURE — 02583ZZ DESTRUCTION OF CONDUCTION MECHANISM, PERCUTANEOUS APPROACH: ICD-10-PCS | Performed by: INTERNAL MEDICINE

## 2017-08-17 PROCEDURE — B244ZZZ ULTRASONOGRAPHY OF RIGHT HEART: ICD-10-PCS | Performed by: INTERNAL MEDICINE

## 2017-08-17 PROCEDURE — 27210789 ZZH NEEDLE BRK TRANSEPTAL CR12

## 2017-08-17 PROCEDURE — 25000125 ZZHC RX 250: Performed by: NURSE ANESTHETIST, CERTIFIED REGISTERED

## 2017-08-17 PROCEDURE — 93320 DOPPLER ECHO COMPLETE: CPT | Mod: 26 | Performed by: INTERNAL MEDICINE

## 2017-08-17 PROCEDURE — C1732 CATH, EP, DIAG/ABL, 3D/VECT: HCPCS

## 2017-08-17 PROCEDURE — 86850 RBC ANTIBODY SCREEN: CPT | Performed by: INTERNAL MEDICINE

## 2017-08-17 PROCEDURE — 25000128 H RX IP 250 OP 636: Performed by: ANESTHESIOLOGY

## 2017-08-17 PROCEDURE — 27210807 ZZH SHEATH CR6

## 2017-08-17 PROCEDURE — 93623 PRGRMD STIMJ&PACG IV RX NFS: CPT

## 2017-08-17 PROCEDURE — 76376 3D RENDER W/INTRP POSTPROCES: CPT | Mod: 26 | Performed by: INTERNAL MEDICINE

## 2017-08-17 PROCEDURE — 40000170 ZZH STATISTIC PRE-PROCEDURE ASSESSMENT II: Performed by: INTERNAL MEDICINE

## 2017-08-17 PROCEDURE — 27210795 ZZH PAD DEFIB QUICK CR4

## 2017-08-17 PROCEDURE — 80048 BASIC METABOLIC PNL TOTAL CA: CPT | Performed by: INTERNAL MEDICINE

## 2017-08-17 PROCEDURE — 86900 BLOOD TYPING SEROLOGIC ABO: CPT | Performed by: ANESTHESIOLOGY

## 2017-08-17 PROCEDURE — 27210946 ZZH KIT HC TOTES DISP CR8

## 2017-08-17 PROCEDURE — 86900 BLOOD TYPING SEROLOGIC ABO: CPT | Performed by: INTERNAL MEDICINE

## 2017-08-17 PROCEDURE — 25000128 H RX IP 250 OP 636: Performed by: NURSE PRACTITIONER

## 2017-08-17 PROCEDURE — C1730 CATH, EP, 19 OR FEW ELECT: HCPCS

## 2017-08-17 PROCEDURE — 25000132 ZZH RX MED GY IP 250 OP 250 PS 637: Performed by: INTERNAL MEDICINE

## 2017-08-17 PROCEDURE — 4A023FZ MEASUREMENT OF CARDIAC RHYTHM, PERCUTANEOUS APPROACH: ICD-10-PCS | Performed by: INTERNAL MEDICINE

## 2017-08-17 PROCEDURE — 86901 BLOOD TYPING SEROLOGIC RH(D): CPT | Performed by: INTERNAL MEDICINE

## 2017-08-17 PROCEDURE — 25000565 ZZH ISOFLURANE, EA 15 MIN: Performed by: INTERNAL MEDICINE

## 2017-08-17 PROCEDURE — C1893 INTRO/SHEATH, FIXED,NON-PEEL: HCPCS

## 2017-08-17 PROCEDURE — 93613 INTRACARDIAC EPHYS 3D MAPG: CPT | Performed by: INTERNAL MEDICINE

## 2017-08-17 PROCEDURE — 27210901 ZZH ACCESS EP PROC CR7

## 2017-08-17 PROCEDURE — 37000009 ZZH ANESTHESIA TECHNICAL FEE, EACH ADDTL 15 MIN: Performed by: INTERNAL MEDICINE

## 2017-08-17 PROCEDURE — 71000015 ZZH RECOVERY PHASE 1 LEVEL 2 EA ADDTL HR: Performed by: INTERNAL MEDICINE

## 2017-08-17 PROCEDURE — 37000008 ZZH ANESTHESIA TECHNICAL FEE, 1ST 30 MIN: Performed by: INTERNAL MEDICINE

## 2017-08-17 PROCEDURE — 02K83ZZ MAP CONDUCTION MECHANISM, PERCUTANEOUS APPROACH: ICD-10-PCS | Performed by: INTERNAL MEDICINE

## 2017-08-17 PROCEDURE — 85027 COMPLETE CBC AUTOMATED: CPT | Performed by: INTERNAL MEDICINE

## 2017-08-17 PROCEDURE — 84703 CHORIONIC GONADOTROPIN ASSAY: CPT | Performed by: INTERNAL MEDICINE

## 2017-08-17 PROCEDURE — 4A0234Z MEASUREMENT OF CARDIAC ELECTRICAL ACTIVITY, PERCUTANEOUS APPROACH: ICD-10-PCS | Performed by: INTERNAL MEDICINE

## 2017-08-17 PROCEDURE — 25000132 ZZH RX MED GY IP 250 OP 250 PS 637: Performed by: ANESTHESIOLOGY

## 2017-08-17 PROCEDURE — 86901 BLOOD TYPING SEROLOGIC RH(D): CPT | Performed by: ANESTHESIOLOGY

## 2017-08-17 PROCEDURE — 93010 ELECTROCARDIOGRAM REPORT: CPT | Mod: 77 | Performed by: INTERNAL MEDICINE

## 2017-08-17 PROCEDURE — C1731 CATH, EP, 20 OR MORE ELEC: HCPCS

## 2017-08-17 PROCEDURE — 93656 COMPRE EP EVAL ABLTJ ATR FIB: CPT | Performed by: INTERNAL MEDICINE

## 2017-08-17 PROCEDURE — 36415 COLL VENOUS BLD VENIPUNCTURE: CPT | Performed by: INTERNAL MEDICINE

## 2017-08-17 PROCEDURE — C9399 UNCLASSIFIED DRUGS OR BIOLOG: HCPCS | Performed by: NURSE ANESTHETIST, CERTIFIED REGISTERED

## 2017-08-17 PROCEDURE — 27210841 ZZH MANIFOLD CR5

## 2017-08-17 PROCEDURE — 93005 ELECTROCARDIOGRAM TRACING: CPT

## 2017-08-17 PROCEDURE — 93312 ECHO TRANSESOPHAGEAL: CPT | Mod: 26 | Performed by: INTERNAL MEDICINE

## 2017-08-17 PROCEDURE — 27210856 ZZH ACCESS HEART CATH CR2

## 2017-08-17 PROCEDURE — 93010 ELECTROCARDIOGRAM REPORT: CPT | Performed by: INTERNAL MEDICINE

## 2017-08-17 PROCEDURE — 93320 DOPPLER ECHO COMPLETE: CPT

## 2017-08-17 RX ORDER — HYDROMORPHONE HYDROCHLORIDE 1 MG/ML
.3-.5 INJECTION, SOLUTION INTRAMUSCULAR; INTRAVENOUS; SUBCUTANEOUS
Status: DISCONTINUED | OUTPATIENT
Start: 2017-08-17 | End: 2017-08-18 | Stop reason: HOSPADM

## 2017-08-17 RX ORDER — LIDOCAINE 40 MG/G
CREAM TOPICAL
Status: DISCONTINUED | OUTPATIENT
Start: 2017-08-17 | End: 2017-08-17 | Stop reason: HOSPADM

## 2017-08-17 RX ORDER — ONDANSETRON 2 MG/ML
INJECTION INTRAMUSCULAR; INTRAVENOUS PRN
Status: DISCONTINUED | OUTPATIENT
Start: 2017-08-17 | End: 2017-08-17

## 2017-08-17 RX ORDER — PROTAMINE SULFATE 10 MG/ML
5-40 INJECTION, SOLUTION INTRAVENOUS EVERY 10 MIN PRN
Status: DISCONTINUED | OUTPATIENT
Start: 2017-08-17 | End: 2017-08-17 | Stop reason: HOSPADM

## 2017-08-17 RX ORDER — GLYCOPYRROLATE 0.2 MG/ML
INJECTION, SOLUTION INTRAMUSCULAR; INTRAVENOUS PRN
Status: DISCONTINUED | OUTPATIENT
Start: 2017-08-17 | End: 2017-08-17

## 2017-08-17 RX ORDER — DIPHENHYDRAMINE HYDROCHLORIDE 50 MG/ML
25-50 INJECTION INTRAMUSCULAR; INTRAVENOUS
Status: DISCONTINUED | OUTPATIENT
Start: 2017-08-17 | End: 2017-08-17 | Stop reason: HOSPADM

## 2017-08-17 RX ORDER — METOPROLOL SUCCINATE 25 MG/1
50 TABLET, EXTENDED RELEASE ORAL DAILY
Status: DISCONTINUED | OUTPATIENT
Start: 2017-08-18 | End: 2017-08-18 | Stop reason: HOSPADM

## 2017-08-17 RX ORDER — DABIGATRAN ETEXILATE 150 MG/1
150 CAPSULE ORAL 2 TIMES DAILY
Status: DISCONTINUED | OUTPATIENT
Start: 2017-08-17 | End: 2017-08-18 | Stop reason: HOSPADM

## 2017-08-17 RX ORDER — ONDANSETRON 2 MG/ML
4 INJECTION INTRAMUSCULAR; INTRAVENOUS EVERY 6 HOURS PRN
Status: DISCONTINUED | OUTPATIENT
Start: 2017-08-17 | End: 2017-08-18 | Stop reason: HOSPADM

## 2017-08-17 RX ORDER — ASPIRIN 81 MG/1
81 TABLET ORAL EVERY MORNING
Status: DISCONTINUED | OUTPATIENT
Start: 2017-08-18 | End: 2017-08-18 | Stop reason: HOSPADM

## 2017-08-17 RX ORDER — ESMOLOL HYDROCHLORIDE 10 MG/ML
INJECTION INTRAVENOUS PRN
Status: DISCONTINUED | OUTPATIENT
Start: 2017-08-17 | End: 2017-08-17

## 2017-08-17 RX ORDER — ACETAMINOPHEN 325 MG/1
975 TABLET ORAL ONCE
Status: COMPLETED | OUTPATIENT
Start: 2017-08-17 | End: 2017-08-17

## 2017-08-17 RX ORDER — SODIUM CHLORIDE 9 MG/ML
INJECTION, SOLUTION INTRAVENOUS CONTINUOUS PRN
Status: DISCONTINUED | OUTPATIENT
Start: 2017-08-17 | End: 2017-08-17

## 2017-08-17 RX ORDER — SODIUM CHLORIDE, SODIUM LACTATE, POTASSIUM CHLORIDE, CALCIUM CHLORIDE 600; 310; 30; 20 MG/100ML; MG/100ML; MG/100ML; MG/100ML
INJECTION, SOLUTION INTRAVENOUS CONTINUOUS PRN
Status: DISCONTINUED | OUTPATIENT
Start: 2017-08-17 | End: 2017-08-17

## 2017-08-17 RX ORDER — ADENOSINE 3 MG/ML
6-12 INJECTION, SOLUTION INTRAVENOUS EVERY 5 MIN PRN
Status: DISCONTINUED | OUTPATIENT
Start: 2017-08-17 | End: 2017-08-17 | Stop reason: HOSPADM

## 2017-08-17 RX ORDER — FUROSEMIDE 10 MG/ML
INJECTION INTRAMUSCULAR; INTRAVENOUS PRN
Status: DISCONTINUED | OUTPATIENT
Start: 2017-08-17 | End: 2017-08-17

## 2017-08-17 RX ORDER — LORAZEPAM 2 MG/ML
.5-2 INJECTION INTRAMUSCULAR EVERY 10 MIN PRN
Status: DISCONTINUED | OUTPATIENT
Start: 2017-08-17 | End: 2017-08-17 | Stop reason: HOSPADM

## 2017-08-17 RX ORDER — FENTANYL CITRATE 50 UG/ML
25-50 INJECTION, SOLUTION INTRAMUSCULAR; INTRAVENOUS
Status: DISCONTINUED | OUTPATIENT
Start: 2017-08-17 | End: 2017-08-17 | Stop reason: HOSPADM

## 2017-08-17 RX ORDER — ATROPINE SULFATE 0.1 MG/ML
.5-1 INJECTION INTRAVENOUS
Status: DISCONTINUED | OUTPATIENT
Start: 2017-08-17 | End: 2017-08-17 | Stop reason: HOSPADM

## 2017-08-17 RX ORDER — VENLAFAXINE HYDROCHLORIDE 150 MG/1
150 TABLET, EXTENDED RELEASE ORAL EVERY EVENING
Status: DISCONTINUED | OUTPATIENT
Start: 2017-08-17 | End: 2017-08-17

## 2017-08-17 RX ORDER — KETOROLAC TROMETHAMINE 30 MG/ML
15-30 INJECTION, SOLUTION INTRAMUSCULAR; INTRAVENOUS
Status: DISCONTINUED | OUTPATIENT
Start: 2017-08-17 | End: 2017-08-17 | Stop reason: HOSPADM

## 2017-08-17 RX ORDER — DOBUTAMINE HYDROCHLORIDE 200 MG/100ML
5-40 INJECTION INTRAVENOUS CONTINUOUS PRN
Status: DISCONTINUED | OUTPATIENT
Start: 2017-08-17 | End: 2017-08-17 | Stop reason: HOSPADM

## 2017-08-17 RX ORDER — FUROSEMIDE 10 MG/ML
10 INJECTION INTRAMUSCULAR; INTRAVENOUS ONCE
Status: COMPLETED | OUTPATIENT
Start: 2017-08-17 | End: 2017-08-17

## 2017-08-17 RX ORDER — AMIODARONE HYDROCHLORIDE 200 MG/1
200 TABLET ORAL EVERY MORNING
Status: DISCONTINUED | OUTPATIENT
Start: 2017-08-18 | End: 2017-08-18 | Stop reason: HOSPADM

## 2017-08-17 RX ORDER — LIDOCAINE 40 MG/G
CREAM TOPICAL
Status: DISCONTINUED | OUTPATIENT
Start: 2017-08-17 | End: 2017-08-18 | Stop reason: HOSPADM

## 2017-08-17 RX ORDER — ONDANSETRON 2 MG/ML
4 INJECTION INTRAMUSCULAR; INTRAVENOUS EVERY 4 HOURS PRN
Status: DISCONTINUED | OUTPATIENT
Start: 2017-08-17 | End: 2017-08-17 | Stop reason: HOSPADM

## 2017-08-17 RX ORDER — DIMENHYDRINATE 50 MG/ML
25 INJECTION, SOLUTION INTRAMUSCULAR; INTRAVENOUS
Status: DISCONTINUED | OUTPATIENT
Start: 2017-08-17 | End: 2017-08-17 | Stop reason: HOSPADM

## 2017-08-17 RX ORDER — PROTAMINE SULFATE 10 MG/ML
1-5 INJECTION, SOLUTION INTRAVENOUS
Status: DISCONTINUED | OUTPATIENT
Start: 2017-08-17 | End: 2017-08-17 | Stop reason: HOSPADM

## 2017-08-17 RX ORDER — PRAVASTATIN SODIUM 40 MG
20 TABLET ORAL EVERY EVENING
Status: DISCONTINUED | OUTPATIENT
Start: 2017-08-17 | End: 2017-08-18 | Stop reason: HOSPADM

## 2017-08-17 RX ORDER — HEPARIN SODIUM 1000 [USP'U]/ML
INJECTION, SOLUTION INTRAVENOUS; SUBCUTANEOUS PRN
Status: DISCONTINUED | OUTPATIENT
Start: 2017-08-17 | End: 2017-08-17

## 2017-08-17 RX ORDER — HYDROCHLOROTHIAZIDE 25 MG/1
25 TABLET ORAL DAILY
Status: DISCONTINUED | OUTPATIENT
Start: 2017-08-18 | End: 2017-08-18 | Stop reason: HOSPADM

## 2017-08-17 RX ORDER — LABETALOL HYDROCHLORIDE 5 MG/ML
10 INJECTION, SOLUTION INTRAVENOUS
Status: DISCONTINUED | OUTPATIENT
Start: 2017-08-17 | End: 2017-08-17 | Stop reason: HOSPADM

## 2017-08-17 RX ORDER — PROMETHAZINE HYDROCHLORIDE 25 MG/ML
6.25-25 INJECTION, SOLUTION INTRAMUSCULAR; INTRAVENOUS EVERY 4 HOURS PRN
Status: DISCONTINUED | OUTPATIENT
Start: 2017-08-17 | End: 2017-08-17 | Stop reason: HOSPADM

## 2017-08-17 RX ORDER — NALOXONE HYDROCHLORIDE 0.4 MG/ML
0.4 INJECTION, SOLUTION INTRAMUSCULAR; INTRAVENOUS; SUBCUTANEOUS EVERY 5 MIN PRN
Status: DISCONTINUED | OUTPATIENT
Start: 2017-08-17 | End: 2017-08-17 | Stop reason: HOSPADM

## 2017-08-17 RX ORDER — SODIUM CHLORIDE, SODIUM LACTATE, POTASSIUM CHLORIDE, CALCIUM CHLORIDE 600; 310; 30; 20 MG/100ML; MG/100ML; MG/100ML; MG/100ML
INJECTION, SOLUTION INTRAVENOUS CONTINUOUS
Status: DISCONTINUED | OUTPATIENT
Start: 2017-08-17 | End: 2017-08-17 | Stop reason: HOSPADM

## 2017-08-17 RX ORDER — HEPARIN SODIUM 1000 [USP'U]/ML
1000-10000 INJECTION, SOLUTION INTRAVENOUS; SUBCUTANEOUS EVERY 5 MIN PRN
Status: DISCONTINUED | OUTPATIENT
Start: 2017-08-17 | End: 2017-08-17 | Stop reason: HOSPADM

## 2017-08-17 RX ORDER — LOSARTAN POTASSIUM 25 MG/1
50 TABLET ORAL DAILY
Status: DISCONTINUED | OUTPATIENT
Start: 2017-08-18 | End: 2017-08-18 | Stop reason: HOSPADM

## 2017-08-17 RX ORDER — NALOXONE HYDROCHLORIDE 0.4 MG/ML
.1-.4 INJECTION, SOLUTION INTRAMUSCULAR; INTRAVENOUS; SUBCUTANEOUS
Status: DISCONTINUED | OUTPATIENT
Start: 2017-08-17 | End: 2017-08-18 | Stop reason: HOSPADM

## 2017-08-17 RX ORDER — HYDROMORPHONE HYDROCHLORIDE 1 MG/ML
.3-.5 INJECTION, SOLUTION INTRAMUSCULAR; INTRAVENOUS; SUBCUTANEOUS EVERY 5 MIN PRN
Status: DISCONTINUED | OUTPATIENT
Start: 2017-08-17 | End: 2017-08-17 | Stop reason: HOSPADM

## 2017-08-17 RX ORDER — FUROSEMIDE 10 MG/ML
20-100 INJECTION INTRAMUSCULAR; INTRAVENOUS
Status: DISCONTINUED | OUTPATIENT
Start: 2017-08-17 | End: 2017-08-17 | Stop reason: HOSPADM

## 2017-08-17 RX ORDER — METHYLPREDNISOLONE SODIUM SUCCINATE 125 MG/2ML
125 INJECTION, POWDER, LYOPHILIZED, FOR SOLUTION INTRAMUSCULAR; INTRAVENOUS ONCE
Status: COMPLETED | OUTPATIENT
Start: 2017-08-17 | End: 2017-08-17

## 2017-08-17 RX ORDER — FLUMAZENIL 0.1 MG/ML
0.2 INJECTION, SOLUTION INTRAVENOUS
Status: DISCONTINUED | OUTPATIENT
Start: 2017-08-17 | End: 2017-08-17 | Stop reason: HOSPADM

## 2017-08-17 RX ORDER — GABAPENTIN 100 MG/1
200 CAPSULE ORAL ONCE
Status: COMPLETED | OUTPATIENT
Start: 2017-08-17 | End: 2017-08-17

## 2017-08-17 RX ORDER — VENLAFAXINE HYDROCHLORIDE 150 MG/1
150 CAPSULE, EXTENDED RELEASE ORAL EVERY EVENING
Status: DISCONTINUED | OUTPATIENT
Start: 2017-08-17 | End: 2017-08-18 | Stop reason: HOSPADM

## 2017-08-17 RX ORDER — FENTANYL CITRATE 50 UG/ML
INJECTION, SOLUTION INTRAMUSCULAR; INTRAVENOUS PRN
Status: DISCONTINUED | OUTPATIENT
Start: 2017-08-17 | End: 2017-08-17

## 2017-08-17 RX ORDER — PROTAMINE SULFATE 10 MG/ML
INJECTION, SOLUTION INTRAVENOUS PRN
Status: DISCONTINUED | OUTPATIENT
Start: 2017-08-17 | End: 2017-08-17

## 2017-08-17 RX ORDER — BUSPIRONE HYDROCHLORIDE 10 MG/1
10 TABLET ORAL 2 TIMES DAILY
Status: DISCONTINUED | OUTPATIENT
Start: 2017-08-17 | End: 2017-08-18 | Stop reason: HOSPADM

## 2017-08-17 RX ORDER — PROPOFOL 10 MG/ML
INJECTION, EMULSION INTRAVENOUS PRN
Status: DISCONTINUED | OUTPATIENT
Start: 2017-08-17 | End: 2017-08-17

## 2017-08-17 RX ADMIN — ONDANSETRON 4 MG: 2 INJECTION INTRAMUSCULAR; INTRAVENOUS at 12:47

## 2017-08-17 RX ADMIN — RANITIDINE 150 MG: 150 TABLET ORAL at 21:34

## 2017-08-17 RX ADMIN — VENLAFAXINE HYDROCHLORIDE 150 MG: 150 CAPSULE, EXTENDED RELEASE ORAL at 21:34

## 2017-08-17 RX ADMIN — ROCURONIUM BROMIDE 20 MG: 10 INJECTION INTRAVENOUS at 09:45

## 2017-08-17 RX ADMIN — ROCURONIUM BROMIDE 10 MG: 10 INJECTION INTRAVENOUS at 12:17

## 2017-08-17 RX ADMIN — PHENYLEPHRINE HYDROCHLORIDE 50 MCG: 10 INJECTION, SOLUTION INTRAMUSCULAR; INTRAVENOUS; SUBCUTANEOUS at 12:24

## 2017-08-17 RX ADMIN — WHITE PETROLATUM MINERAL OIL 1 INCH: 150; 830 OINTMENT OPHTHALMIC at 08:02

## 2017-08-17 RX ADMIN — Medication 10 MCG/MIN: at 12:16

## 2017-08-17 RX ADMIN — FENTANYL CITRATE 50 MCG: 50 INJECTION, SOLUTION INTRAMUSCULAR; INTRAVENOUS at 12:31

## 2017-08-17 RX ADMIN — ROCURONIUM BROMIDE 20 MG: 10 INJECTION INTRAVENOUS at 10:31

## 2017-08-17 RX ADMIN — HEPARIN SODIUM 2000 UNITS: 1000 INJECTION, SOLUTION INTRAVENOUS; SUBCUTANEOUS at 12:25

## 2017-08-17 RX ADMIN — FUROSEMIDE 10 MG: 10 INJECTION, SOLUTION INTRAVENOUS at 21:31

## 2017-08-17 RX ADMIN — SODIUM CHLORIDE: 9 INJECTION, SOLUTION INTRAVENOUS at 08:00

## 2017-08-17 RX ADMIN — ACETAMINOPHEN AND CODEINE PHOSPHATE 2 TABLET: 300; 30 TABLET ORAL at 16:57

## 2017-08-17 RX ADMIN — METHYLPREDNISOLONE SODIUM SUCCINATE 125 MG: 125 INJECTION, POWDER, FOR SOLUTION INTRAMUSCULAR; INTRAVENOUS at 12:48

## 2017-08-17 RX ADMIN — HEPARIN SODIUM 11000 UNITS: 1000 INJECTION, SOLUTION INTRAVENOUS; SUBCUTANEOUS at 09:26

## 2017-08-17 RX ADMIN — ROCURONIUM BROMIDE 30 MG: 10 INJECTION INTRAVENOUS at 09:04

## 2017-08-17 RX ADMIN — FENTANYL CITRATE 100 MCG: 50 INJECTION, SOLUTION INTRAMUSCULAR; INTRAVENOUS at 07:51

## 2017-08-17 RX ADMIN — ACETAMINOPHEN 975 MG: 325 TABLET, FILM COATED ORAL at 07:00

## 2017-08-17 RX ADMIN — FENTANYL CITRATE 25 MCG: 50 INJECTION INTRAMUSCULAR; INTRAVENOUS at 14:05

## 2017-08-17 RX ADMIN — BUSPIRONE HYDROCHLORIDE 10 MG: 10 TABLET ORAL at 21:33

## 2017-08-17 RX ADMIN — FENTANYL CITRATE 50 MCG: 50 INJECTION, SOLUTION INTRAMUSCULAR; INTRAVENOUS at 08:37

## 2017-08-17 RX ADMIN — DABIGATRAN ETEXILATE MESYLATE 150 MG: 150 CAPSULE ORAL at 21:34

## 2017-08-17 RX ADMIN — PROTAMINE SULFATE 50 MG: 10 INJECTION, SOLUTION INTRAVENOUS at 12:45

## 2017-08-17 RX ADMIN — PROPOFOL 50 MG: 10 INJECTION, EMULSION INTRAVENOUS at 13:05

## 2017-08-17 RX ADMIN — SUGAMMADEX 200 MG: 100 INJECTION, SOLUTION INTRAVENOUS at 13:03

## 2017-08-17 RX ADMIN — MIDAZOLAM HYDROCHLORIDE 2 MG: 1 INJECTION, SOLUTION INTRAMUSCULAR; INTRAVENOUS at 07:40

## 2017-08-17 RX ADMIN — ESMOLOL HYDROCHLORIDE 30 MG: 10 INJECTION, SOLUTION INTRAVENOUS at 12:33

## 2017-08-17 RX ADMIN — AMIODARONE HYDROCHLORIDE 150 MG: 1.5 INJECTION, SOLUTION INTRAVENOUS at 12:56

## 2017-08-17 RX ADMIN — SODIUM CHLORIDE, POTASSIUM CHLORIDE, SODIUM LACTATE AND CALCIUM CHLORIDE: 600; 310; 30; 20 INJECTION, SOLUTION INTRAVENOUS at 12:45

## 2017-08-17 RX ADMIN — HEPARIN SODIUM 3000 UNITS: 1000 INJECTION, SOLUTION INTRAVENOUS; SUBCUTANEOUS at 10:40

## 2017-08-17 RX ADMIN — HYDROMORPHONE HYDROCHLORIDE 0.4 MG: 1 INJECTION, SOLUTION INTRAMUSCULAR; INTRAVENOUS; SUBCUTANEOUS at 14:56

## 2017-08-17 RX ADMIN — FENTANYL CITRATE 25 MCG: 50 INJECTION INTRAMUSCULAR; INTRAVENOUS at 13:52

## 2017-08-17 RX ADMIN — FENTANYL CITRATE 25 MCG: 50 INJECTION, SOLUTION INTRAMUSCULAR; INTRAVENOUS at 11:03

## 2017-08-17 RX ADMIN — ACETAMINOPHEN AND CODEINE PHOSPHATE 2 TABLET: 300; 30 TABLET ORAL at 21:34

## 2017-08-17 RX ADMIN — ROCURONIUM BROMIDE 100 MG: 10 INJECTION INTRAVENOUS at 07:51

## 2017-08-17 RX ADMIN — Medication 0.2 MG: at 07:51

## 2017-08-17 RX ADMIN — FENTANYL CITRATE 25 MCG: 50 INJECTION, SOLUTION INTRAMUSCULAR; INTRAVENOUS at 10:06

## 2017-08-17 RX ADMIN — ROCURONIUM BROMIDE 20 MG: 10 INJECTION INTRAVENOUS at 11:13

## 2017-08-17 RX ADMIN — FUROSEMIDE 10 MG: 10 INJECTION, SOLUTION INTRAVENOUS at 12:43

## 2017-08-17 RX ADMIN — PHENYLEPHRINE HYDROCHLORIDE 50 MCG: 10 INJECTION, SOLUTION INTRAMUSCULAR; INTRAVENOUS; SUBCUTANEOUS at 12:18

## 2017-08-17 RX ADMIN — HYDROMORPHONE HYDROCHLORIDE 0.3 MG: 1 INJECTION, SOLUTION INTRAMUSCULAR; INTRAVENOUS; SUBCUTANEOUS at 14:35

## 2017-08-17 RX ADMIN — SODIUM CHLORIDE, POTASSIUM CHLORIDE, SODIUM LACTATE AND CALCIUM CHLORIDE: 600; 310; 30; 20 INJECTION, SOLUTION INTRAVENOUS at 07:30

## 2017-08-17 RX ADMIN — PROPOFOL 150 MG: 10 INJECTION, EMULSION INTRAVENOUS at 07:51

## 2017-08-17 RX ADMIN — PHENYLEPHRINE HYDROCHLORIDE 50 MCG: 10 INJECTION, SOLUTION INTRAMUSCULAR; INTRAVENOUS; SUBCUTANEOUS at 07:51

## 2017-08-17 RX ADMIN — SODIUM CHLORIDE, POTASSIUM CHLORIDE, SODIUM LACTATE AND CALCIUM CHLORIDE 1000 ML: 600; 310; 30; 20 INJECTION, SOLUTION INTRAVENOUS at 14:41

## 2017-08-17 RX ADMIN — GABAPENTIN 200 MG: 100 CAPSULE ORAL at 07:00

## 2017-08-17 ASSESSMENT — PAIN DESCRIPTION - DESCRIPTORS: DESCRIPTORS: DISCOMFORT

## 2017-08-17 NOTE — OP NOTE
EP PROCEDURE NOTE    Procedures:  1. Left Atrial Wide Area Circumferential radiofrequency Ablation.  2. Trans-septal Puncture x2  3. Intracardiac Echocardiography.  4. Invasive Hemodynamic Monitoring.  5. 3D electro-anatomic Mapping using Carto3.  6. Esophageal temperature monitoring.  7. Isuprel infusion.    Attending: Husam Doll MD  Procedure Date: 8/17/2017    Pre-operative Diagnosis:  Paroxysmal Atrial Fibrillaltion resistant to AAT (flecinide and amiodarone), Dora symptom score III.  Post-operative diagnosis:  S/p successful isolation of all PVs, entry and exit block through WACA   Complications:   None.  Fluoroscopy time/dose: 9 minutes, 573 cGycm2.    Clinical Profile:  Ms. Hernandez is a 57 year old female who has a past medical significant for HTN, HLD, depression, PVCs, and PAF (CHADSVASC 2 on Xarelto switched to Pradaxa for ablation). She has previously been on Flecainide and now on amiodarone with recurrent symptomatic AF. She has an indication for for afib ablation and presented today to the EP lab for that purpose. She was switched from Xarelkto to Pradaxa in preporation for this and has taken this uninterrupted.     PROCEDURE  The risks and benefits of the procedure were explained to the patient in full.  The risks include, but are not limited to: pain, bleeding, blood transfusion reactions, arrhythmia, dissection of vessels, cardiac perforation, pericardial effusion, stroke, and death. Informed Consent was obtained. The patient was brought to the EP lab in a fasting and hemodynamically stable condition. The patient was prepped and draped in a sterile fashion. This procedure was done under general anesthesia.  Sheaths and Catheters:  After local anesthesia with 2% lidocaine, vascular access was obtained using the modified Seldinger technique for the following access points:    Right Femoral Vein:   - 7Fr Locking Sheath: A decapolar catheter was positioned into the coronary sinus.    - 8Fr Sheath:  Exchanged for an SL1 trans-septal Sheath through which a Biosense Gomez Thermacool Smartouch 3.5 mm tip SF DF curve mapping and ablation catheter was placed into the LA.  - 8Fr Sheath: exchanged for SL1 trans-septal sheath sheath through which a Pentarray catheter was placed into the LA.    Left Femoral Vein:  - 9 Fr Sheath - an ICE catheter was placed into the RA / RV.    Right Femoral Artery:   - 4Fr Sheath was placed for hemodynamic monitoring.    EP study results (in milliseconds):  Pre-ablation: In Sinus rhythm, AA= 1000, VV= 1000, IL= 180, QRS= 82, QT= 480.  Post-ablation: AA=VV= 800, IL= 150, QRS= 90, QT= 450.    Procedure Description:  After the above sheaths were in place, the catheters were positioned under fluoroscopic guidance. The ICE catheter was placed into the RA.  Baseline survey of the heart with the ICE did not reveal the presence of any significant pericardial effusion.  A temperature probe was placed in the esophagus.  We then performed the trans-septal access:  The guide wire was advanced into the SVC.  The SL1 trans-septal sheath and dilator were advanced over the guide wire into the SVC and directed medially.  The guide wire was removed, the dilator was aspirated and the BRK-1 needle was advanced. Using both the [place] and [place] projections, the trans-septal system was then dragged down and the Fossa Ovalis was engaged. Tenting of the interatrial septum was observed with ICE.  The needle was advanced into the LA and the location confirmed using ICE.  While fixing the needle, the dilator was slightly advanced across the septum. The dilator and then the sheath were advanced though the septum into the LA. The trans-septal needle was then withdrawn from the dilator. The dilator was removed and the sheath was then flushed. A Heparin bolus and drip were started, with serial monitoring of ACTs to keep ACT around 350. A second trans-septal puncture was then performed in a similar fashion as described  above.  We used the Pentarray and ablation catheters to build the LA, pulmonary vein and LA appendage geometry with FAM on Carto3. This was matched with the cardiac CT 3D reconstruction of the LA and pulmonary veins.   With the left atrial map in place, we then performed a wide area circumferential ablation of the LA. High output pacing technique was used to identify phrenic location and avoid injury of the phrenic nerve. The Pentarray was used to confirm electrical isolation of the pulmonary veins. Differential pacing was performed from the coronary sinus, the left atrial appendage, and pulmonary veins, and the veins were re-touched. Electrical isolation was achieved in all four pulmonary veins. Entry and Exit block were confirmed with either spontaneous PV extrasystole or pacing from inside the PV.  Provocation:  -An isuprel infusion was then started with max dose 15 mcg/min. The patient's heart rate increased adequately.  No atrial fibrillation or atrial tachycardia was induced. NO reconnection of the veins was observed. The isuprel drip was then discontinued after a period of > 15 minutes.    The sheaths were pulled out of the left atrium into the RA.  Heparin was stopped and protamine was given, after a test dose had revealed no reaction.  The catheters were withdrawn, and the sheaths were pulled.  Manual pressure was applied to both groins. The patient was then transported to PACU for extubation then to a monitored bed.     ASSESSMENT:  1. Symptomatic Paroxysmal  Atrial Fibrillation, refractory to AAT.    2. Successful pulmonary vein isolation with entry and exit block.    PLAN:  1. Re-start oral anticoagulation (pradaxa) tonight.  2. Transfer to a monitored bed.  3. I/O = 2.5L/0.3L, lasix given. Also received solumedrol 125 mg IV x 1 dose.    Husam Doll MD [facility]  Cardiology - Electrophysiology

## 2017-08-17 NOTE — OR NURSING
Safe transfer to unit 6D. Groin puncture sites examined with the bedside RN; unchanged from previous, CMS and pulses intact bilat feet.

## 2017-08-17 NOTE — IP AVS SNAPSHOT
Unit 6D Observation [address] [address]    Phone:  [phone]    Fax:  [phone]                                       After Visit Summary   8/17/2017    Barb Hernandez    MRN: [id number]           After Visit Summary Signature Page     I have received my discharge instructions, and my questions have been answered. I have discussed any challenges I see with this plan with the nurse or doctor.    ..........................................................................................................................................  Patient/Patient Representative Signature      ..........................................................................................................................................  Patient Representative Print Name and Relationship to Patient    ..................................................               ................................................  Date                                            Time    ..........................................................................................................................................  Reviewed by Signature/Title    ...................................................              ..............................................  Date                                                            Time

## 2017-08-17 NOTE — OR NURSING
Ana Bella PA-C returned page regarding patient's complaint of bilat arm pain radiating into the arms and neck. ERIN Bella does not feel that the pain is cardiac, but rather due to positioning. Will continue with plan to send pt to unit 6D, she will write for pain medication for the floor. Pain is relenting after pt received hydromorphone. All VSS stable and no changes noted in EKG. Pt denies dyspnea, chest pressure, nausea. Is not diaphoretic and skin color is baseline. Continue to monitor.

## 2017-08-17 NOTE — ANESTHESIA CARE TRANSFER NOTE
Patient: Barb Hernandez    Procedure(s):  Atrial Ablation  - Wound Class: I-Clean    Diagnosis: Atrial Fibrillation   Diagnosis Additional Information: No value filed.    Anesthesia Type:   General     Note:  Airway :Face Mask  Patient transferred to:PACU  Comments: To PACU, VSS, pt awake and alert, exchanging well, report to RN, care accepted. Bilateral groin sheath sites intact, scant amount of blood on dressings.      Vitals: (Last set prior to Anesthesia Care Transfer)    CRNA VITALS  8/17/2017 1251 - 8/17/2017 1331      8/17/2017             Pulse: 95    SpO2: 100 %    Resp Rate (set): 10                Electronically Signed By: ADRIANNA Hernández CRNA  August 17, 2017  1:31 PM

## 2017-08-17 NOTE — OR NURSING
Call to Dr Green to inform him of the patient's complaints of shoulder pain. Dr Green came to bedside to see pt, no new orders received. Pt notes that she has had a C3-4 fusion in the past. Pain medication has substantially relieved the shoulder pain.

## 2017-08-17 NOTE — ANESTHESIA PREPROCEDURE EVALUATION
ANESTHESIA PREOP EVALUATION    NPO Status: NPO for Medical/Clinical Reasons Except for: Other; Specify: May take medications with a drink of water prior to Electrophysiology study    Procedure: elective EP ablation    HPI: a. fibrillation     PMHx/PSHx/ROS:  PAST MEDICAL HISTORY:   Past Medical History:   Diagnosis Date     Anxiety      Atrial fibrillation (H)      Constipation      Depression      Fibromuscular dysplasia (H)      Gastroesophageal reflux disease      HLD (hyperlipidemia)      HTN (hypertension)      Intraparenchymal renal artery disease (H)      Migraines      Palpitation      PVC's (premature ventricular contractions)        PAST SURGICAL HISTORY:   Past Surgical History:   Procedure Laterality Date     AS PTA RENAL/VISCERAL ARTERY Right      C/SECTION, CLASSICAL        SECTION       FUSION CERVICAL ANTERIOR ONE LEVEL      c3-C4     HYSTERECTOMY       ORTHOPEDIC SURGERY      knee arthroscopy bilaterally      TONSILLECTOMY         FAMILY HISTORY:   Family History   Problem Relation Age of Onset     CANCER Mother      Asthma Mother      Heart Murmur Sister      prolapse     Depression Sister      Coronary Artery Disease Father          Past Anes Hx: No personal or family h/o anesthesia problems    Soc Hx:   Tobacco:   EtOH:     Allergies: No Known Allergies    Meds:   Prescriptions Prior to Admission   Medication Sig Dispense Refill Last Dose     VITAMIN D, CHOLECALCIFEROL, PO Take 2,000 Units by mouth daily   2017 at 0330     RANITIDINE HCL PO Take 150 mg by mouth 2 times daily   2017 at 0330     aspirin 81 MG tablet Take 81 mg by mouth every morning   2017 at 0330     MELATONIN PO Take by mouth At Bedtime   2017 at pm     dabigatran ANTICOAGULANT (PRADAXA ANTICOAGULANT) 150 MG capsule Take 1 capsule (150 mg) by mouth 2 times daily Store in original 's bottle or blister pack; use within 120 days of opening. 60 capsule 5 2017 at pm     HYDROCHLOROTHIAZIDE  PO Take 25 mg by mouth daily   8/17/2017 at 0330     venlafaxine (EFFEXOR-ER) 150 MG TB24 24 hr tablet Take 150 mg by mouth every evening    8/16/2017 at pm     BUSPIRONE HCL PO Take 10 mg by mouth 2 times daily    8/17/2017 at 0330     PRAVASTATIN SODIUM PO Take 20 mg by mouth every evening    8/16/2017 at pm     senna-docusate (SENOKOT-S;PERICOLACE) 8.6-50 MG per tablet Take 2 tablets by mouth every morning    8/16/2017 at pm     METOPROLOL SUCCINATE ER PO Take 50 mg by mouth   8/17/2017 at 0330     LOSARTAN POTASSIUM PO Take 50 mg by mouth   8/17/2017 at 0330     Acetaminophen (TYLENOL PO) Take 650 mg by mouth every 4 hours as needed for mild pain or fever (for migraines)   More than a month at am     magnesium sulfate (EPSOM SALT) GRAN granules Apply topically 2 times daily as needed for skin care   More than a month at Unknown time     AMIODARONE HCL PO Take 200 mg by mouth every morning    Taking       No current outpatient prescriptions on file.       Physical Exam:  VS: T 98.5, P Data Unavailable, /85, R 15, SpO2 97%     Airway: MP 2, TM>3FB, Neck full ROM  Dentition: no loose teeth  Heart: irregular  Lungs: CTAB      BMP:  No results found for: NA   No results found for: POTASSIUM  No results found for: CHLORIDE  No results found for: TORIN  No results found for: CO2  No results found for: BUN  No results found for: CR  No results found for: GLC     CBC:  No results found for: WBC  No results found for: HGB  No results found for: HCT  No results found for: PLT     Coags/Type and Screen  No results found for: INR  No results found for: PT  Type and Screen:  Clinical history: Atrial fibrillation, pre ablation  Comparison CMR: None     1. The LV is normal in cavity size and wall thickness. The global systolic function is normal. The LVEF is  61%. There are no regional wall motion abnormalities.     2. The RV is normal in cavity size. The global systolic function is normal. The RVEF is 70%.      3. Both  atria are normal in size.     4. No intracardiac thrombus is visualized.     5. Late gadolinium enhancement imaging shows no MI, fibrosis or infiltrative disease.      6.  A pulmonary venous MRA was performed.  Four pulmonary veins are noted draining into the left atrium  with measurements as described below.  There is no evidence of accessory or anomalous venous drainage.   There is no evidence of pulmonary venous stenosis.    Assessment/Plan:  - ASA 3  - GETA with standard ASA monitors, IV induction, balanced anesthetic  - PIVx2; a-line or slave or cardiology  - Antibiotics per surgery  - PONV prophylaxis  - Blood products available, possible administration discussed with patient    Aldo Green MD    8/17/2017  6:38 AM         Physical Exam      Airway   Mallampati: II  TM distance: >3 FB  Neck ROM: limited    Dental     Cardiovascular   Rhythm and rate: regular and normal      Pulmonary    breath sounds clear to auscultation    Other findings: Past C3-4 cervical fusion .... Somewhat limited neck extension                Anesthesia Plan      History & Physical Review  History and physical reviewed and following examination; no interval change.    ASA Status:  3 .    NPO Status:  > 4 hours    Plan for General with Intravenous induction. Maintenance will be Balanced.    PONV prophylaxis:  Ondansetron (or other 5HT-3) and Dexamethasone or Solumedrol  Additional equipment: 2nd IV and Arterial Line History and physical assessed; Patient examined.  I have reviewed and agree with this pre-op assessment and anesthetic plan.  Patient and family also agree.   Risks and alternatives presented and discussed.   AQA.  -rcp        Postoperative Care  Postoperative pain management:  IV analgesics and Multi-modal analgesia.      Consents  Anesthetic plan, risks, benefits and alternatives discussed with:  Patient..                          .

## 2017-08-17 NOTE — ANESTHESIA POSTPROCEDURE EVALUATION
Patient: Barb Hernandez    Procedure(s):  Atrial Ablation  - Wound Class: I-Clean    Diagnosis:Atrial Fibrillation   Diagnosis Additional Information: No value filed.    Anesthesia Type:  General    Note:  Anesthesia Post Evaluation    Patient location during evaluation: PACU  Patient participation: Able to fully participate in evaluation  Level of consciousness: sleepy but conscious  Pain management: adequate  Airway patency: patent  Cardiovascular status: acceptable  Respiratory status: acceptable  Hydration status: acceptable  PONV: controlled     Anesthetic complications: None    Comments: Patient awake to voice, clear a/w.  Stable VS.  Stable rhythm.  No new or current issues evident at this time.  OK out per PACU protocol.  --rcp          Last vitals:  Vitals:    08/17/17 1350 08/17/17 1400 08/17/17 1415   BP: 143/83 134/82 130/78   Resp: 14 12 12   Temp:      SpO2: 100% 99% 96%         Electronically Signed By: Aldo Green MD  August 17, 2017  2:51 PM

## 2017-08-17 NOTE — H&P
Electrophysiology Pre-Procedure History and Physical    Barb Hernandez MRN# [id number]   Age: 57 year old YOB: 1960      Date of Procedure: 8/17/2017  Location [facility]      Date of Exam 8/17/2017 Facility (Same day)       Home clinic: [facility]  Primary care provider: Mindy Castañeda  HPI:  Ms. Hernandez is a 57 year old female who has a past medical significant for HTN, HLD, depression, PVCs, and PAF (CHADSVASC 2 on Xarelto switched to Pradaxa for ablation). She has previously been on Flecainide with recurrent AF and decision was made to pursue PVI. She was placed on amiodarone with goal for short course. She had a PVI scheduled at [facility]; however, wanted to get second opinion here. She was found to be back in AF was arranged for an outpatient DCCV. When she presented for DCCV she was noted to be back in SB and DCCV was cancelled. She saw Dr. Doll in clinic and decided to pursue PVI ablation for which she presents today. She was switched to Pradaxa in preporation for this and has taken this uninterrupted.        Active problem list:   There are no active problems to display for this patient.           Medications (include herbals and vitamins):   Any Plavix use in the last 7 days? No     Current Facility-Administered Medications   Medication     lidocaine (LMX4) kit     lidocaine 1 % 1 mL     sodium chloride (PF) 0.9% PF flush 3 mL     sodium chloride (PF) 0.9% PF flush 3 mL     lidocaine 1 % 1 mL     lidocaine (LMX4) kit     sodium chloride (PF) 0.9% PF flush 3 mL     sodium chloride (PF) 0.9% PF flush 3 mL      Barb Hernandez   Home Medication Instructions DOUGLAS:[id number]    Printed on:08/17/17 0704   Medication Information                      Acetaminophen (TYLENOL PO)  Take 650 mg by mouth every 4 hours as needed for mild pain or fever (for migraines)             AMIODARONE HCL PO  Take 200 mg by mouth every morning             "  aspirin 81 MG tablet  Take 81 mg by mouth every morning             BUSPIRONE HCL PO  Take 10 mg by mouth 2 times daily              dabigatran ANTICOAGULANT (PRADAXA ANTICOAGULANT) 150 MG capsule  Take 1 capsule (150 mg) by mouth 2 times daily Store in original 's bottle or blister pack; use within 120 days of opening.             HYDROCHLOROTHIAZIDE PO  Take 25 mg by mouth daily             LOSARTAN POTASSIUM PO  Take 50 mg by mouth             magnesium sulfate (EPSOM SALT) GRAN granules  Apply topically 2 times daily as needed for skin care             MELATONIN PO  Take by mouth At Bedtime             METOPROLOL SUCCINATE ER PO  Take 50 mg by mouth             PRAVASTATIN SODIUM PO  Take 20 mg by mouth every evening              RANITIDINE HCL PO  Take 150 mg by mouth 2 times daily             senna-docusate (SENOKOT-S;PERICOLACE) 8.6-50 MG per tablet  Take 2 tablets by mouth every morning              venlafaxine (EFFEXOR-ER) 150 MG TB24 24 hr tablet  Take 150 mg by mouth every evening              VITAMIN D, CHOLECALCIFEROL, PO  Take 2,000 Units by mouth daily                         Allergies:    No Known Allergies  Allergy to Latex? No  Allergy to tape?   No  Intolerances: NA          Social History:     Social History   Substance Use Topics     Smoking status: Never Smoker     Smokeless tobacco: Never Used     Alcohol use Yes      Comment: rare             Physical Exam:   All vitals have been reviewed  Patient Vitals for the past 8 hrs:   BP Temp Temp src Heart Rate Resp SpO2 Height Weight   08/17/17 0553 134/85 98.5  F (36.9  C) Oral 66 15 97 % 1.778 m (5' 10\") 109.8 kg (242 lb 1 oz)        Airway assessment:   Patient is able to open mouth wide  Patient is able to stick out tongue}      ENT:   Normocephalic, without obvious abnormality, atraumatic, sinuses nontender on palpation, external ears without lesions, oral pharynx with moist mucous membranes, tonsils without erythema or exudates, " gums normal and good dentition.     Neck:   Supple, symmetrical, trachea midline, no adenopathy, thyroid symmetric, not enlarged and no tenderness, skin normal     Lungs:   No increased work of breathing, good air exchange, clear to auscultation bilaterally, no crackles or wheezing     Cardiovascular:   Normal apical impulse, regular rate and rhythm, normal S1 and S2, no S3 or S4, and no murmur noted             Lab / Radiology Results:     Lab Results   Component Value Date    WBC 6.7 08/17/2017    RBC 4.28 08/17/2017    HGB 13.0 08/17/2017    HCT 40.2 08/17/2017    MCV 94 08/17/2017    RDW 13.5 08/17/2017     08/17/2017      Lab Results   Component Value Date    WBC 6.7 08/17/2017     Lab Results   Component Value Date     08/17/2017     Lab Results   Component Value Date    HGB 13.0 08/17/2017    HCT 40.2 08/17/2017       Lab Results   Component Value Date    TSH 1.52 07/12/2017             Plan:   Patient's active problems diagnostically and therapeutically optimized for the planned procedure  Patient here for PVI ablation for AF. Procedure explained in detail to patient including indications, risks, and benefits. The procedural risk of EP study and ablation were discussed in detail. Risks discussed include: vascular complications, CVA, AVB, pericardial effusion and tamponade, esophageal fistula, PV stenosis. Patient states understanding and wishes to proceed.     ADRIANNA Rojas CNP  Electrophysiology Consult Service  Pager: [phone]

## 2017-08-17 NOTE — DISCHARGE INSTRUCTIONS
Care of groin site:         Remove the Band-Aid after 24 hours. If there is minor oozing, apply another Band-aid and remove it after 12 hours.          Do NOT take a bath, use a hot tub, pool, or submerse in water for at least 3 days You may shower.          It is normal to have a small bruise or lump at the site.         Do not scrub the site.         Do not use lotion or powder near the puncture site for 3 days.         For the first 2 days: Do not stoop or squat. When you cough, sneeze or move your bowels, hold your hand over the puncture site and press gently.         Do not lift more than 10 pounds or exertional activity for 10 days.      If you start bleeding from the site in your groin:  Lie down flat and press firmly on the site.  Call your physician immediately, or, come to the emergency room.    Call 911 right away if you have bleeding that is heavy or does not stop.     Call your doctor/provider if:         You have a large or growing hard lump around the site.         The site is red, swollen, hot or tender.         Blood or fluid is draining from the site.         You have chills or a fever greater than 101 F (38 C).         Your leg or arm turns bluish, feels numb or cool.         You have hives, a rash or unusual itching.     Cardiovascular Clinic:   [address] [place] [address]. [place] [address]  Your Care Team:  EP Cardiology   Telephone Number     Ana Doll [phone]   Rach Olmedo RN  [phone]     For scheduling appts or procedures:    Janae Jain   [phone]     As always, Thank you for trusting us with your health care needs

## 2017-08-17 NOTE — IP AVS SNAPSHOT
MRN:[id number]                      After Visit Summary   8/17/2017    Barb Hernandez    MRN: [id number]           Thank you!     Thank you for choosing [facility] for your care. Our goal is always to provide you with excellent care. Hearing back from our patients is one way we can continue to improve our services. Please take a few minutes to complete the written survey that you may receive in the mail after you visit with us. Thank you!        Patient Information     Date Of Birth          1960        About your hospital stay     You were admitted on:  August 17, 2017 You last received care in the:  Unit 6D Observation [facility]    You were discharged on:  August 18, 2017       Who to Call     For medical emergencies, please call 911.  For non-urgent questions about your medical care, please call your primary care provider or clinic, [phone]  For questions related to your surgery, please call your surgery clinic        Attending Provider     Provider Specialty    Husam Doll MD Clinical Cardiac Electrophysiology       Primary Care Provider Office Phone # Fax #    Mindy Gaudencio Castañeda [phone]      Your next 10 appointments already scheduled     Nov 22, 2017 10:30 AM CST   (Arrive by 10:15 AM)   RETURN ARRHYTHMIA with Husam Doll MD   [facility] ([facility])    [address] [place] [id number] [phone]              Further instructions from your care team           Care of groin site:         Remove the Band-Aid after 24 hours. If there is minor oozing, apply another Band-aid and remove it after 12 hours.          Do NOT take a bath, use a hot tub, pool, or submerse in water for at least 3 days You may shower.          It is normal to have a small bruise or lump at the site.         Do not scrub the site.         Do not use lotion or powder near the puncture site for 3 days.         For the  first 2 days: Do not stoop or squat. When you cough, sneeze or move your bowels, hold your hand over the puncture site and press gently.         Do not lift more than 10 pounds or exertional activity for 10 days.      If you start bleeding from the site in your groin:  Lie down flat and press firmly on the site.  Call your physician immediately, or, come to the emergency room.    Call 911 right away if you have bleeding that is heavy or does not stop.     Call your doctor/provider if:         You have a large or growing hard lump around the site.         The site is red, swollen, hot or tender.         Blood or fluid is draining from the site.         You have chills or a fever greater than 101 F (38 C).         Your leg or arm turns bluish, feels numb or cool.         You have hives, a rash or unusual itching.     Cardiovascular Clinic:   [address] [place] [address]. [place] [address]  Your Care Team:  EP Cardiology   Telephone Number     Ana Doll [phone]   Rach Olmedo RN  [phone]     For scheduling appts or procedures:    Janae Jain   [phone]     As always, Thank you for trusting us with your health care needs          Additional Information     If you use hormonal birth control (such as the pill, patch, ring or implants): You'll need a second form of birth control for 7 days (condoms, a diaphragm or contraceptive foam). While in the hospital, you received a medicine called Bridion. Your normal birth control will not work as well for a week after taking this medicine.          Pending Results     Date and Time Order Name Status Description    8/17/2017 1341 EKG 12-lead, tracing only Preliminary             Statement of Approval     Ordered          08/18/17 0903  I have reviewed and agree with all the recommendations and orders detailed in this document.  EFFECTIVE NOW     Approved and electronically signed by:  Ana Flores APRN CNP      "        Admission Information     Date & Time Provider Department Dept. Phone    8/17/2017 Husam Doll MD Unit 6D Observation [facility] [phone]      Your Vitals Were     Blood Pressure Pulse Temperature Respirations Height Weight    108/73 (BP Location: Left arm) 69 98.1  F (36.7  C) (Oral) 18 1.778 m (5' 10\") 109.8 kg (242 lb 1 oz)    Pulse Oximetry BMI (Body Mass Index)                98% 34.73 kg/m2          [facility] Information     [facility] gives you secure access to your electronic health record. If you see a primary care provider, you can also send messages to your care team and make appointments. If you have questions, please call your primary care clinic.  If you do not have a primary care provider, please call [phone] and they will assist you.        Care EveryWhere ID     This is your Care EveryWhere ID. This could be used by other organizations to access your [place] medical records  [id number]        Equal Access to Services     TRICIA VELASCO AH: Hadii ashley juarezo Sodarrin, waaxda luqadaha, qaybta kaalmada adeegyada, chantale velasquez . So [facility] [place] [facility] [phone].    ATENCIÓN: Si habla español, tiene a vargas disposición servicios gratuitos de asistencia lingüística. Llame al [phone].    We comply with applicable federal civil rights laws and [place] laws. We do not discriminate on the basis of race, color, national origin, age, disability sex, sexual orientation or gender identity.               Review of your medicines      CONTINUE these medicines which have NOT CHANGED        Dose / Directions    AMIODARONE HCL PO        Dose:  200 mg   Take 200 mg by mouth every morning   Refills:  0       aspirin 81 MG tablet        Dose:  81 mg   Take 81 mg by mouth every morning   Refills:  0       BUSPIRONE HCL PO        Dose:  10 mg   Take 10 mg by mouth 2 times daily   Refills:  0       dabigatran ANTICOAGULANT 150 MG capsule   Commonly known as:  PRADAXA ANTICOAGULANT "   Used for:  Paroxysmal atrial fibrillation (H)        Dose:  150 mg   Take 1 capsule (150 mg) by mouth 2 times daily Store in original 's bottle or blister pack; use within 120 days of opening.   Quantity:  60 capsule   Refills:  5       HYDROCHLOROTHIAZIDE PO        Dose:  25 mg   Take 25 mg by mouth daily   Refills:  0       LOSARTAN POTASSIUM PO        Dose:  50 mg   Take 50 mg by mouth   Refills:  0       magnesium sulfate Gran granules   Commonly known as:  EPSOM SALT        Apply topically 2 times daily as needed for skin care   Refills:  0       MELATONIN PO        Take by mouth At Bedtime   Refills:  0       METOPROLOL SUCCINATE ER PO        Dose:  50 mg   Take 50 mg by mouth   Refills:  0       PRAVASTATIN SODIUM PO        Dose:  20 mg   Take 20 mg by mouth every evening   Refills:  0       RANITIDINE HCL PO        Dose:  150 mg   Take 150 mg by mouth 2 times daily   Refills:  0       senna-docusate 8.6-50 MG per tablet   Commonly known as:  SENOKOT-S;PERICOLACE        Dose:  2 tablet   Take 2 tablets by mouth every morning   Refills:  0       TYLENOL PO        Dose:  650 mg   Take 650 mg by mouth every 4 hours as needed for mild pain or fever (for migraines)   Refills:  0       venlafaxine 150 MG Tb24 24 hr tablet   Commonly known as:  EFFEXOR-ER        Dose:  150 mg   Take 150 mg by mouth every evening   Refills:  0       VITAMIN D (CHOLECALCIFEROL) PO        Dose:  2000 Units   Take 2,000 Units by mouth daily   Refills:  0                Protect others around you: Learn how to safely use, store and throw away your medicines at www.disposemymeds.org.             Medication List: This is a list of all your medications and when to take them. Check marks below indicate your daily home schedule. Keep this list as a reference.      Medications           Morning Afternoon Evening Bedtime As Needed    AMIODARONE HCL PO   Take 200 mg by mouth every morning   Last time this was given:  200 mg on  8/18/2017  7:57 AM                                aspirin 81 MG tablet   Take 81 mg by mouth every morning                                BUSPIRONE HCL PO   Take 10 mg by mouth 2 times daily   Last time this was given:  10 mg on 8/18/2017  7:58 AM                                dabigatran ANTICOAGULANT 150 MG capsule   Commonly known as:  PRADAXA ANTICOAGULANT   Take 1 capsule (150 mg) by mouth 2 times daily Store in original 's bottle or blister pack; use within 120 days of opening.   Last time this was given:  150 mg on 8/18/2017  7:57 AM                                HYDROCHLOROTHIAZIDE PO   Take 25 mg by mouth daily   Last time this was given:  25 mg on 8/18/2017  7:58 AM                                LOSARTAN POTASSIUM PO   Take 50 mg by mouth   Last time this was given:  50 mg on 8/18/2017  7:58 AM                                magnesium sulfate Gran granules   Commonly known as:  EPSOM SALT   Apply topically 2 times daily as needed for skin care                                MELATONIN PO   Take by mouth At Bedtime                                METOPROLOL SUCCINATE ER PO   Take 50 mg by mouth   Last time this was given:  50 mg on 8/18/2017  7:58 AM                                PRAVASTATIN SODIUM PO   Take 20 mg by mouth every evening                                RANITIDINE HCL PO   Take 150 mg by mouth 2 times daily   Last time this was given:  150 mg on 8/18/2017  7:57 AM                                senna-docusate 8.6-50 MG per tablet   Commonly known as:  SENOKOT-S;PERICOLACE   Take 2 tablets by mouth every morning                                TYLENOL PO   Take 650 mg by mouth every 4 hours as needed for mild pain or fever (for migraines)   Last time this was given:  650 mg on 8/18/2017  9:33 AM                                venlafaxine 150 MG Tb24 24 hr tablet   Commonly known as:  EFFEXOR-ER   Take 150 mg by mouth every evening                                VITAMIN D  (CHOLECALCIFEROL) PO   Take 2,000 Units by mouth daily

## 2017-08-17 NOTE — PROGRESS NOTES
"SPIRITUAL HEALTH SERVICES  [facility] ([place]) 6D Observation  ON-CALL VISIT     REFERRAL SOURCE: On-call  visit with pt, , and mother, per request for hospital  visit as noted in initial nursing assessment.     Pt welcomed  support, was in good spirits, said \"I'm so grateful that the surgery went well. I'm feeling better already.\" Pt mentioned her \"wonderful grandchildren. They will be happy that I will have more energy now to play with them.\" Pt's Caodaism marina is integral to her sense of healing and wholeness; she welcomed prayer.     PLAN: pt expects to discharge soon.  support available if requested.     Sonido Gastelum) Danielle Coronel M.Div., [facility]  Staff   Pager [phone]                                                                                          "

## 2017-08-17 NOTE — ANESTHESIA POSTPROCEDURE EVALUATION
Patient: Barb Hernandez    Procedure(s):  Atrial Ablation  - Wound Class: I-Clean    Diagnosis:Atrial Fibrillation   Diagnosis Additional Information: No value filed.    Anesthesia Type:  General    Note:  Anesthesia Post Evaluation         Comments: PACU Note # 2:  1505 pm  Just before departure, patient c/o of bilateral arm pain, starting at the C3-4 level (consistent with some of her SSX's from her prior neck surgery).  No weakness detected. EKG and course reviewed by Cardiology.  No concerns for cardiac etiology.  Plan: comfort care, CO2 monitoring, re-evaluate in am.   Patient understands and agrees.   ---rcp        Last vitals:  Vitals:    08/17/17 1350 08/17/17 1400 08/17/17 1415   BP: 143/83 134/82 130/78   Resp: 14 12 12   Temp:      SpO2: 100% 99% 96%         Electronically Signed By: Aldo Green MD  August 17, 2017  3:11 PM

## 2017-08-18 VITALS
TEMPERATURE: 98.1 F | RESPIRATION RATE: 18 BRPM | DIASTOLIC BLOOD PRESSURE: 73 MMHG | HEIGHT: 70 IN | WEIGHT: 242.06 LBS | SYSTOLIC BLOOD PRESSURE: 108 MMHG | HEART RATE: 69 BPM | BODY MASS INDEX: 34.65 KG/M2 | OXYGEN SATURATION: 98 %

## 2017-08-18 LAB — INTERPRETATION ECG - MUSE: NORMAL

## 2017-08-18 PROCEDURE — 25000128 H RX IP 250 OP 636: Performed by: NURSE PRACTITIONER

## 2017-08-18 PROCEDURE — 25000132 ZZH RX MED GY IP 250 OP 250 PS 637: Performed by: NURSE PRACTITIONER

## 2017-08-18 PROCEDURE — 99213 OFFICE O/P EST LOW 20 MIN: CPT | Performed by: INTERNAL MEDICINE

## 2017-08-18 RX ORDER — ACETAMINOPHEN 325 MG/1
650 TABLET ORAL EVERY 4 HOURS PRN
Status: DISCONTINUED | OUTPATIENT
Start: 2017-08-18 | End: 2017-08-18 | Stop reason: HOSPADM

## 2017-08-18 RX ORDER — FUROSEMIDE 10 MG/ML
10 INJECTION INTRAMUSCULAR; INTRAVENOUS ONCE
Status: COMPLETED | OUTPATIENT
Start: 2017-08-18 | End: 2017-08-18

## 2017-08-18 RX ADMIN — LOSARTAN POTASSIUM 50 MG: 25 TABLET ORAL at 07:58

## 2017-08-18 RX ADMIN — ASPIRIN 81 MG: 81 TABLET, COATED ORAL at 07:57

## 2017-08-18 RX ADMIN — DABIGATRAN ETEXILATE MESYLATE 150 MG: 150 CAPSULE ORAL at 07:57

## 2017-08-18 RX ADMIN — ACETAMINOPHEN 650 MG: 325 TABLET, FILM COATED ORAL at 09:33

## 2017-08-18 RX ADMIN — METOPROLOL SUCCINATE 50 MG: 25 TABLET, EXTENDED RELEASE ORAL at 07:58

## 2017-08-18 RX ADMIN — HYDROCHLOROTHIAZIDE 25 MG: 25 TABLET ORAL at 07:58

## 2017-08-18 RX ADMIN — ACETAMINOPHEN AND CODEINE PHOSPHATE 2 TABLET: 300; 30 TABLET ORAL at 01:25

## 2017-08-18 RX ADMIN — RANITIDINE 150 MG: 150 TABLET ORAL at 07:57

## 2017-08-18 RX ADMIN — FUROSEMIDE 10 MG: 10 INJECTION, SOLUTION INTRAVENOUS at 07:57

## 2017-08-18 RX ADMIN — BUSPIRONE HYDROCHLORIDE 10 MG: 10 TABLET ORAL at 07:58

## 2017-08-18 RX ADMIN — AMIODARONE HYDROCHLORIDE 200 MG: 200 TABLET ORAL at 07:57

## 2017-08-18 NOTE — PROGRESS NOTES
Groin site remains CDI with no bleeding or hematoma noted. All goals met for discharge. Discharge instructions given to patient and all questions answered. Patient able to verbalize understanding of instructions. PIV discontinued. All belongings with patient. Family here to provide ride home. Patient discharged to home.

## 2017-08-18 NOTE — DISCHARGE SUMMARY
"  Electrophysiology Discharge Summary  Date of Procedure: 8/17/17  DOS: 8/18/2017   Pre-operative Diagnosis:  Paroxysmal Atrial Fibrillaltion resistant to AAT (flecinide and amiodarone), Dora symptom score III.  Post-operative diagnosis:  S/p successful isolation of all PVs, entry and exit block through [facility] Course:  Ms. Hernandez is a 57 year old female who has a past medical significant for HTN, HLD, depression, PVCs, and PAF (CHADSVASC 2 on Xarelto switched to Pradaxa for ablation). She has previously been on Flecainide with recurrent AF and decision was made to pursue PVI. She was placed on amiodarone with goal for short course. She had a PVI scheduled at [facility]; however, wanted to get second opinion here. She was found to be back in AF was arranged for an outpatient DCCV. When she presented for DCCV she was noted to be back in SB and DCCV was cancelled. She saw Dr. Doll in clinic and decided to pursue PVI ablation for which she presents today. She was switched to Pradaxa in preporation for this and has taken this uninterrupted.   Patient underwent a successful PVI ablation yesterday. She had an uneventful overnight stay. Telemetry reviewed showing SR with rare ectopy. Groin sites are soft, CDI, no bruit, no bleeding, and no hematoma. Patient is tolerating oral intake, ambulating at baseline, and voiding without difficulties. Still +about 800cc, will give one time IV lasix 10 mg dose. Patient remains hemodynamically stable.     ROS:   10 point ROS neg other than the symptoms noted above.   Exam:  /64 (BP Location: Left arm)  Pulse 69  Temp 98.2  F (36.8  C) (Oral)  Resp 18  Ht 1.778 m (5' 10\")  Wt 109.8 kg (242 lb 1 oz)  SpO2 95%  BMI 34.73 kg/m2    Constitutional: healthy, alert and no distress  Head: Normocephalic. No masses, lesions, tenderness or abnormalities  Neck: Neck supple. No adenopathy. Thyroid symmetric, normal size,, Carotids without bruits.  ENT: ENT exam normal, no neck " nodes or sinus tenderness  Cardiovascular: negative, PMI normal. No lifts, heaves, or thrills. RRR. No murmurs, clicks gallops or rub  Respiratory: negative, Percussion normal. Good diaphragmatic excursion. Lungs clear  Gastrointestinal: Abdomen soft, non-tender. BS normal. No masses, organomegaly  : Deferred  Musculoskeletal: extremities normal- no gross deformities noted, gait normal and normal muscle tone  Skin: no suspicious lesions or rashes  Neurologic: Gait normal. Reflexes normal and symmetric. Sensation grossly WNL.  Psychiatric: mentation appears normal and affect normal/bright  Hematologic/Lymphatic/Immunologic: Normal cervical lymph nodes    Discharge Medications:   David Barbkathya Jimenez   Home Medication Instructions DOUGLAS:[id number]    Printed on:08/18/17 8088   Medication Information                      Acetaminophen (TYLENOL PO)  Take 650 mg by mouth every 4 hours as needed for mild pain or fever (for migraines)             AMIODARONE HCL PO  Take 200 mg by mouth every morning              aspirin 81 MG tablet  Take 81 mg by mouth every morning             BUSPIRONE HCL PO  Take 10 mg by mouth 2 times daily              dabigatran ANTICOAGULANT (PRADAXA ANTICOAGULANT) 150 MG capsule  Take 1 capsule (150 mg) by mouth 2 times daily Store in original 's bottle or blister pack; use within 120 days of opening.             HYDROCHLOROTHIAZIDE PO  Take 25 mg by mouth daily             LOSARTAN POTASSIUM PO  Take 50 mg by mouth             magnesium sulfate (EPSOM SALT) GRAN granules  Apply topically 2 times daily as needed for skin care             MELATONIN PO  Take by mouth At Bedtime             METOPROLOL SUCCINATE ER PO  Take 50 mg by mouth             PRAVASTATIN SODIUM PO  Take 20 mg by mouth every evening              RANITIDINE HCL PO  Take 150 mg by mouth 2 times daily             senna-docusate (SENOKOT-S;PERICOLACE) 8.6-50 MG per tablet  Take 2 tablets by mouth every morning               venlafaxine (EFFEXOR-ER) 150 MG TB24 24 hr tablet  Take 150 mg by mouth every evening              VITAMIN D, CHOLECALCIFEROL, PO  Take 2,000 Units by mouth daily                 Patient Instructions:    Care of groin site:         Remove the Band-Aid after 24 hours. If there is minor oozing, apply another Band-aid and remove it after 12 hours.          Do NOT take a bath, use a hot tub, pool, or submerse in water for at least 3 days You may shower.          It is normal to have a small bruise or lump at the site.         Do not scrub the site.         Do not use lotion or powder near the puncture site for 3 days.         For the first 2 days: Do not stoop or squat. When you cough, sneeze or move your bowels, hold your hand over the puncture site and press gently.         Do not lift more than 10 pounds or exertional activity for 10 days.      If you start bleeding from the site in your groin:  Lie down flat and press firmly on the site.  Call your physician immediately, or, come to the emergency room.    Call 911 right away if you have bleeding that is heavy or does not stop.     Call your doctor/provider if:         You have a large or growing hard lump around the site.         The site is red, swollen, hot or tender.         Blood or fluid is draining from the site.         You have chills or a fever greater than 101 F (38 C).         Your leg or arm turns bluish, feels numb or cool.         You have hives, a rash or unusual itching.     Plan & Follow up:    Continue home medications without changes    Follow up with Dr. Doll in 3 months    Discharge to home  The patient states understanding and is agreeable with the plan.   This patient was seen and examined with Dr. Doll. The above note reflects our joint assessment and plan.  ADRIANNA Rojas CNP  Electrophysiology Consult Service  Pager: [phone]    EP STAFF NOTE  I have seen and examined the patient as part of a shared visit with ZEENAT Rojas NP.   I agree with the note above. I reviewed today's vital signs and medications. I have reviewed and discussed with the advanced practice provider their physical examination, assessment, and plan   Briefly, s/p afib ablation  My key history/exam findings are: RRR.   The key management decisions made by me: doing well, follow-up as outpatient.    Husam Doll MD [facility]  Cardiology - Electrophysiology

## 2017-08-18 NOTE — PROGRESS NOTES
Bedrest completed at 1930.  Bilat groin sites cdi.  No bleeding no hematoma.  Pp 2+.  Cms intact.  NSR, no ectopy noted.  Pt eating and walking.  Hampton catheter removed at 1930.  Pt is due to void.  Pt had option to DC to tonight after goals met, md aware pt wishes to stay until 8/18 AM.

## 2017-08-18 NOTE — PROGRESS NOTES
"/64 (BP Location: Left arm)  Pulse 69  Temp 98.2  F (36.8  C) (Oral)  Resp 18  Ht 1.778 m (5' 10\")  Wt 109.8 kg (242 lb 1 oz)  SpO2 95%  BMI 34.73 kg/m2    Bilateral groin sites cdi, no bleeding or hematoma. Pt complains of some discomfort that is managed by Tylenol 3. Has been able to void since lakhani removal. Tolerating eating and drinking ok. Pt has rested well through the night and will plan to discharge this am.    "

## 2017-09-12 ENCOUNTER — TELEPHONE (OUTPATIENT)
Dept: OTHER | Facility: CLINIC | Age: 57
End: 2017-09-12

## 2017-09-12 NOTE — TELEPHONE ENCOUNTER
Ms. Hernandez called today reporting low blood pressures and dizziness. Recently her Toprol XL was changed to BID to help with palpitations. She reports these have improved with this medication change. We will have her decrease her Losartan to 25 mg daily in order to make room for the additional BB dose. She has been instructed to call if ongoing symptoms or seek emergency care if she feels unstable. Follow up as scheduled. She states understanding and is agreeable with the plan.  ADRIANNA Rojas CNP  Electrophysiology Consult Service  Pager: [phone]

## 2017-11-20 ENCOUNTER — PRE VISIT (OUTPATIENT)
Dept: CARDIOLOGY | Facility: CLINIC | Age: 57
End: 2017-11-20

## 2017-11-20 DIAGNOSIS — I48.91 ATRIAL FIBRILLATION, UNSPECIFIED TYPE (H): Primary | ICD-10-CM

## 2017-11-22 ENCOUNTER — OFFICE VISIT (OUTPATIENT)
Dept: CARDIOLOGY | Facility: CLINIC | Age: 57
End: 2017-11-22
Attending: INTERNAL MEDICINE
Payer: COMMERCIAL

## 2017-11-22 VITALS
SYSTOLIC BLOOD PRESSURE: 123 MMHG | HEIGHT: 70 IN | BODY MASS INDEX: 35.36 KG/M2 | DIASTOLIC BLOOD PRESSURE: 81 MMHG | WEIGHT: 247 LBS | HEART RATE: 61 BPM | OXYGEN SATURATION: 96 %

## 2017-11-22 DIAGNOSIS — I48.0 PAROXYSMAL ATRIAL FIBRILLATION (H): Primary | ICD-10-CM

## 2017-11-22 DIAGNOSIS — Z79.899 ON AMIODARONE THERAPY: ICD-10-CM

## 2017-11-22 PROCEDURE — 93005 ELECTROCARDIOGRAM TRACING: CPT | Mod: ZF

## 2017-11-22 PROCEDURE — 0298T ZZC EXT ECG > 48HR TO 21 DAY REVIEW AND INTERPRETATN: CPT | Performed by: INTERNAL MEDICINE

## 2017-11-22 PROCEDURE — 0296T ZIO PATCH HOLTER: CPT | Mod: ZF | Performed by: INTERNAL MEDICINE

## 2017-11-22 PROCEDURE — 99212 OFFICE O/P EST SF 10 MIN: CPT | Mod: ZF

## 2017-11-22 PROCEDURE — 99214 OFFICE O/P EST MOD 30 MIN: CPT | Mod: 25 | Performed by: INTERNAL MEDICINE

## 2017-11-22 PROCEDURE — 93010 ELECTROCARDIOGRAM REPORT: CPT | Mod: ZP | Performed by: INTERNAL MEDICINE

## 2017-11-22 RX ORDER — AMIODARONE HYDROCHLORIDE 200 MG/1
100 TABLET ORAL DAILY
Qty: 45 TABLET | Refills: 1 | Status: SHIPPED | OUTPATIENT
Start: 2017-11-22 | End: 2018-06-08

## 2017-11-22 RX ORDER — LOSARTAN POTASSIUM 25 MG/1
25 TABLET ORAL 2 TIMES DAILY
Qty: 180 TABLET | Refills: 3 | Status: SHIPPED | OUTPATIENT
Start: 2017-11-22 | End: 2018-11-18

## 2017-11-22 ASSESSMENT — PAIN SCALES - GENERAL: PAINLEVEL: NO PAIN (0)

## 2017-11-22 NOTE — NURSING NOTE
Chief Complaint   Patient presents with     Follow Up For     3 mo f/u AF ablation. EKG     Vitals were taken and medication were reconciled. EKG performed.    Katelyn Bautista CMA    10:38 AM    Per Dr. Husam Doll, patient to have 14 day Ziopatch monitor placed.  Diagnosis: Paroxysmal atrial fibrillation  Monitor placed: Yes  Patient Instructed: Yes  Patient verbalized understanding: Yes  Holter # [id number]  Placed by AMRITA Gomes

## 2017-11-22 NOTE — MR AVS SNAPSHOT
After Visit Summary   11/22/2017    Barb Hernandez    MRN: [id number]           Patient Information     Date Of Birth          1960        Visit Information        Provider Department      11/22/2017 10:30 AM Husam Doll MD [facility]        Today's Diagnoses     Paroxysmal atrial fibrillation (H)    -  1    On amiodarone therapy          Care Instructions    Cardiology Provider you saw in clinic today: Dr. Doll    Medication Changes:     1. Decrease amiodarone to 100mg daily   2. Stop Pradaxa, take Xarelto 20mg daily in the evening - copay card given.    Labs/Tests needed:     1. 14 day ziopatch     Follow-up Visit:  3 months with labs        Please contact us via Dispatch for questions or concerns.    Rach Montenegro RN   Electrophysiology Nurse Coordinator.  [phone]    If your question concerns the schedule/appointment times, contact:  ZE[facility]AT [facility]re[facility] Procedure   [phone]    Device Clinic (Pacemakers, ICD, Loop Recorders)   [phone]      You will receive all normal lab and testing results via Dispatch or mail if not reviewed in clinic today.   If you need a medication refill please contact your pharmacy.    As always, thank you for trusting us with your health care needs!            Follow-ups after your visit        Follow-up notes from your care team     Return in about 3 months (around 2/22/2018) for Dr. Doll.      Your next 10 appointments already scheduled     Feb 07, 2018  9:30 AM CST   Lab with  LAB   [facility] ([facility])    [address] [place] [address]  [address]st [facility] [address] [phone]            Feb 07, 2018 10:00 AM CST   (Arrive by 9:45 AM)   RETURN ARRHYTHMIA with Husam Doll MD   [facility] ([facility])    [address] [place] [address]  3rd [facility] [address] [phone]              Future tests that were ordered for you today     Open Future Orders         "Priority Expected Expires Ordered    Hepatic panel STAT 2/21/2018 5/16/2018 11/22/2017    TSH with free T4 reflex STAT 2/21/2018 5/16/2018 11/22/2017            Who to contact     If you have questions or need follow up information about today's clinic visit or your schedule please contact [facility] directly at [phone].  Normal or non-critical lab and imaging results will be communicated to you by Minkahart, letter or phone within 4 business days after the clinic has received the results. If you do not hear from us within 7 days, please contact the clinic through Sekal ASt or phone. If you have a critical or abnormal lab result, we will notify you by phone as soon as possible.  Submit refill requests through Optimitive or call your pharmacy and they will forward the refill request to us. Please allow 3 business days for your refill to be completed.          Additional Information About Your Visit        MinkaharRaise Your Flag Information     Optimitive gives you secure access to your electronic health record. If you see a primary care provider, you can also send messages to your care team and make appointments. If you have questions, please call your primary care clinic.  If you do not have a primary care provider, please call [phone] and they will assist you.        Care EveryWhere ID     This is your Care EveryWhere ID. This could be used by other organizations to access your [facility] medical records  [id number]        Your Vitals Were     Pulse Height Pulse Oximetry BMI (Body Mass Index)          61 1.778 m (5' 10\") 96% 35.44 kg/m2         Blood Pressure from Last 3 Encounters:   11/22/17 123/81   08/18/17 108/73   06/28/17 129/77    Weight from Last 3 Encounters:   11/22/17 112 kg (247 lb)   08/17/17 109.8 kg (242 lb 1 oz)   06/28/17 110 kg (242 lb 6.4 oz)                 Today's Medication Changes          These changes are accurate as of: 11/22/17 11:48 AM.  If you have any questions, ask your nurse or doctor.    "            Start taking these medicines.        Dose/Directions    rivaroxaban ANTICOAGULANT 20 MG Tabs tablet   Commonly known as:  XARELTO   Used for:  Paroxysmal atrial fibrillation (H)   Started by:  Husam Doll MD        Dose:  20 mg   Take 1 tablet (20 mg) by mouth daily (with dinner)   Quantity:  90 tablet   Refills:  3         These medicines have changed or have updated prescriptions.        Dose/Directions    amiodarone 200 MG tablet   Commonly known as:  PACERONE/CODARONE   This may have changed:    - medication strength  - how much to take  - when to take this   Used for:  Paroxysmal atrial fibrillation (H)   Changed by:  Husam Doll MD        Dose:  100 mg   Take 0.5 tablets (100 mg) by mouth daily   Quantity:  45 tablet   Refills:  1       losartan 25 MG tablet   Commonly known as:  COZAAR   This may have changed:  when to take this   Used for:  Paroxysmal atrial fibrillation (H)   Changed by:  Husam Doll MD        Dose:  25 mg   Take 1 tablet (25 mg) by mouth 2 times daily   Quantity:  180 tablet   Refills:  3         Stop taking these medicines if you haven't already. Please contact your care team if you have questions.     dabigatran ANTICOAGULANT 150 MG capsule   Commonly known as:  PRADAXA ANTICOAGULANT   Stopped by:  Husam Doll MD                Where to get your medicines      These medications were sent to [place] [address] [place] [facility] [place], [place] - [address] [facility]  [address] [facility] [address]     Phone:  [phone]     amiodarone 200 MG tablet    losartan 25 MG tablet    rivaroxaban ANTICOAGULANT 20 MG Tabs tablet                Primary Care Provider Office Phone # Fax #    Mindy Castañeda [phone]       [facility] [address] [facility] [address]        Equal Access to Services     [facility] ROD AH: Breann Gustafson, ras leggett, qaybmaye kachantale muse. So [facility]  [phone].    ATENCIÓN: Si hans burgess, tiene a vargas disposición servicios gratuitos de asistencia lingüística. Ernst tomlinson [phone].    We comply with applicable federal civil rights laws and [place] laws. We do not discriminate on the basis of race, color, national origin, age, disability, sex, sexual orientation, or gender identity.            Thank you!     Thank you for choosing [facility]  for your care. Our goal is always to provide you with excellent care. Hearing back from our patients is one way we can continue to improve our services. Please take a few minutes to complete the written survey that you may receive in the mail after your visit with us. Thank you!             Your Updated Medication List - Protect others around you: Learn how to safely use, store and throw away your medicines at www.disposemymeds.org.          This list is accurate as of: 11/22/17 11:48 AM.  Always use your most recent med list.                   Brand Name Dispense Instructions for use Diagnosis    amiodarone 200 MG tablet    PACERONE/CODARONE    45 tablet    Take 0.5 tablets (100 mg) by mouth daily    Paroxysmal atrial fibrillation (H)       aspirin 81 MG tablet      Take 81 mg by mouth every morning        BUSPIRONE HCL PO      Take 10 mg by mouth 2 times daily        HYDROCHLOROTHIAZIDE PO      Take 25 mg by mouth daily        losartan 25 MG tablet    COZAAR    180 tablet    Take 1 tablet (25 mg) by mouth 2 times daily    Paroxysmal atrial fibrillation (H)       magnesium sulfate Gran granules    EPSOM SALT     Apply topically 2 times daily as needed for skin care        MELATONIN PO      Take by mouth At Bedtime        METOPROLOL SUCCINATE ER PO      Take 50 mg by mouth 2 times daily        PRAVASTATIN SODIUM PO      Take 20 mg by mouth every evening        RANITIDINE HCL PO      Take 150 mg by mouth 2 times daily        rivaroxaban ANTICOAGULANT 20 MG Tabs tablet    XARELTO    90 tablet    Take 1 tablet  (20 mg) by mouth daily (with dinner)    Paroxysmal atrial fibrillation (H)       senna-docusate 8.6-50 MG per tablet    SENOKOT-S;PERICOLACE     Take 2 tablets by mouth every morning        TYLENOL PO      Take 650 mg by mouth every 4 hours as needed for mild pain or fever (for migraines)        venlafaxine 150 MG Tb24 24 hr tablet    EFFEXOR-ER     Take 150 mg by mouth every evening        VITAMIN D (CHOLECALCIFEROL) PO      Take 2,000 Units by mouth daily

## 2017-11-22 NOTE — PROGRESS NOTES
CARDIAC ELECTROPHYSIOLOGY PATIENT VISIT    Ms Hernandez is a 56 yo female with symptomatic, paroxysmal atrial fibrillation who underwent successful PVI ablation 8/17/17.  She is here for follow up.    Prior HPI at EP Visit June 2017  HPI:  Ms Hernandez is a 56 yo female with symptomatic, paroxysmal atrial fibrillation referred for consideration of ablation.  She was diagnosed with paroxysmal atrial fibrillation in 2015 in the setting of an acute illness but even after recovering she continued to have paroxysms of atrial fibrillation prompting emergency department visits, especially over the past year.  Her symptoms include palpitations, dyspnea, chest discomfort, and weakness.  She has never undergone electrical cardioversion.  She was evaluated at [facility] where she was started on flecainide 50 mg BID but she developed RVR during her stress test and, because the risk of flecainide toxicity could not be excluded, it was discontinued.  Rate control with metoprolol & diltiazem has been impeded by lightheadedness even to the point of ED visits.  Three weeks ago she was started on amiodarone 400 mg with the intention of performing ablative peripheral vein isolation but she has continued to have episodic AF.  She says that she once again presented to an emergency department 1.5 weeks ago with AF but converted to sinus rhythm after a few doses of metoprolol.  Two days ago she saw one of our NPs, Narda Benavidez, to transfer her care to the [facility] because she and her  have been pleased with her husbands medical care by the gastroenterology group here.  She was in AF in the 80s but had been symptomatic for the 4 days prior so she was scheduled to undergo DCCV yesterday.  When she presented, however, she was in sinus rhythm.    Her medical history is notable for hypertension, hyperlipidemia, migraines, depression & anxiety.    Current cardiovascular medications include Amiodarone 400 mg daily, rivaroxaban,  losartan 50 mg daily, metoprolol XL 50 mg daily, HCTZ 25 mg daily, pravastatin 20 mg daily.  She has tolerated rivaroxaban without significant bleeding issues.    EP visit 17  Patient here post ablation in July.  Still feels that she is having her symptoms of palpitations and fatigue, but they are not nearly as severe.  Her toprol was increased to BID and losartan was decreased to 25mg.  Has not really noticed much change in her symptoms since that change.  She did increase her losartan back to 50mg daily due to some higher blood pressures that are better controlled now.      No bleeding issues with pradaxa, recently had knee injections for arthritis. Does want to switch back to xarelto because she is having some nuisance bruising.    Wants to come off amiodarone reading more about side affects.    EKG here with sinus bradycardia, normal intervals.    Past Medical History:   Diagnosis Date     Anxiety      Arthritis      Atrial fibrillation (H)      Constipation      Depression      Fibromuscular dysplasia (H)      Gastroesophageal reflux disease      HLD (hyperlipidemia)      HTN (hypertension)      Intraparenchymal renal artery disease (H)      Migraines      Palpitation      PVC's (premature ventricular contractions)      Past Surgical History:   Procedure Laterality Date     AS PTA RENAL/VISCERAL ARTERY Right      C/SECTION, CLASSICAL        SECTION       FUSION CERVICAL ANTERIOR ONE LEVEL      c3-C4     H ABLATION FOCAL AFIB       HYSTERECTOMY       ORTHOPEDIC SURGERY      knee arthroscopy bilaterally      TONSILLECTOMY       Family History   Problem Relation Age of Onset     CANCER Mother      Asthma Mother      Heart Murmur Sister      prolapse     Depression Sister      Coronary Artery Disease Father      Social History     Social History     Marital status:      Spouse name: N/A     Number of children: N/A     Years of education: N/A     Occupational History     Not on file.     Social  "History Main Topics     Smoking status: Never Smoker     Smokeless tobacco: Never Used     Alcohol use Yes      Comment: rare      Drug use: No     Sexual activity: Yes     Other Topics Concern     Not on file     Social History Narrative     No Known Allergies     Examination:  /81 (BP Location: Left arm, Patient Position: Chair, Cuff Size: Adult Large)  Pulse 61  Ht 1.778 m (5' 10\")  Wt 112 kg (247 lb)  SpO2 96%  BMI 35.44 kg/m2  General appearance:  Comfortable-appearing female with unlabored breathing.  Neuro:  Alert & fully oriented.  Eyes:  Anicteric sclerae.  Conjugative gaze.  Neck:  Supple.  JVP is not elevated.  CV:  RRR with normal s1 and s2 and no murmur or rub.  Warm extremities with mild non-pitting edema.  Lungs:  Clear on ascultation.  Abdomen:  Soft.  Not tender or distended.  Skin:  No petechiae/ecchymoses.  Not jaundiced.    Labs, Imaging & Procedures were reviewed.    HESHAM  Interpretation Summary  Left ventricular function, chamber size, wall motion, and wall thickness are  normal.The EF is 55-60%.  The left atrial appendage is normal. It is free of spontaneous echo contrast  and thrombus.    Cardiac MR     1. The LV is normal in cavity size and wall thickness. The global systolic function is normal. The LVEF is 61%. There are no regional wall motion abnormalities.     2. The RV is normal in cavity size. The global systolic function is normal. The RVEF is 70%.      3. Both atria are normal in size.     4. No intracardiac thrombus is visualized.     5. Late gadolinium enhancement imaging shows no MI, fibrosis or infiltrative disease.      6.  A pulmonary venous MRA was performed.  Four pulmonary veins are noted draining into the left atrium  with measurements as described below.  There is no evidence of accessory or anomalous venous drainage.   There is no evidence of pulmonary venous stenosis.      Impression & Plan:  Ms Hernandez is a 56 yo female with symptomatic, paroxysmal atrial " fibrillation despite treatment with amiodarone, intolerant of negative chronotropic agents (lightheadedness), and unable to use class Ic antiarrhythmics because she could not undergo a stress test (RVR) to exclude flecainide toxicity.  She under PVI ablation 8/17/17. She feels she might have some palpitations infrequently.      Stroke prophylaxis:  CHADSVasc score is 2 for hypertension and female gender.  Was on xarelto, switched to pradaxa for the procedure, will switch back to xarelto now per patient preference.    Rate control:  Continue metoprolol XL 50 mg BID.  Was increased after patient started feeling recurrent symptoms.    Rhythm control, indicated because she is symptomatic even when rate controlled:  She under PVI ablation 8/17/17. Still on amiodarone 200mg daily, will decrease to 100mg daily.  Will get ziopatch to assess if she still having AF    Seen and discussed with Dr. Doll  Follow up in 3 months.    Paula Duval MD PGY4  Cardiology Fellow  [phone]    EP STAFF NOTE  Patient seen and examined and management plan personally reviewed with the patient. I agree with the note above by the CV/EP fellow.  Husam Doll MD [facility]  Cardiology - Electrophysiology

## 2017-11-22 NOTE — PATIENT INSTRUCTIONS
Cardiology Provider you saw in clinic today: Dr. Doll    Medication Changes:     1. Decrease amiodarone to 100mg daily   2. Stop Pradaxa, take Xarelto 20mg daily in the evening - copay card given.    Labs/Tests needed:     1. 14 day ziopatch     Follow-up Visit:  3 months with labs        Please contact us via [facility] for questions or concerns.    Rach Montenegro RN   Electrophysiology Nurse Coordinator.  [phone]    If your question concerns the schedule/appointment times, contact:  [facility]   [phone]    Device Clinic (Pacemakers, ICD, Loop Recorders)   [phone]      You will receive all normal lab and testing results via [facility] or mail if not reviewed in clinic today.   If you need a medication refill please contact your pharmacy.    As always, thank you for trusting us with your health care needs!

## 2017-11-22 NOTE — LETTER
11/22/2017      RE: Barb Hernandez  [address]  [place] [address]       Dear Colleague,    Thank you for the opportunity to participate in the care of your patient, Barb Hernandez, at the [facility]. Please see a copy of my visit note below.    CARDIAC ELECTROPHYSIOLOGY PATIENT VISIT    Ms Hernandez is a 56 yo female with symptomatic, paroxysmal atrial fibrillation who underwent successful PVI ablation 8/17/17.  She is here for follow up.    Prior HPI at EP Visit June 2017  HPI:  Ms Hernandez is a 56 yo female with symptomatic, paroxysmal atrial fibrillation referred for consideration of ablation.  She was diagnosed with paroxysmal atrial fibrillation in 2015 in the setting of an acute illness but even after recovering she continued to have paroxysms of atrial fibrillation prompting emergency department visits, especially over the past year.  Her symptoms include palpitations, dyspnea, chest discomfort, and weakness.  She has never undergone electrical cardioversion.  She was evaluated at [facility] where she was started on flecainide 50 mg BID but she developed RVR during her stress test and, because the risk of flecainide toxicity could not be excluded, it was discontinued.  Rate control with metoprolol & diltiazem has been impeded by lightheadedness even to the point of ED visits.  Three weeks ago she was started on amiodarone 400 mg with the intention of performing ablative peripheral vein isolation but she has continued to have episodic AF.  She says that she once again presented to an emergency department 1.5 weeks ago with AF but converted to sinus rhythm after a few doses of metoprolol.  Two days ago she saw one of our NPs, Narda Benavidez, to transfer her care to the [facility] because she and her  have been pleased with her husbands medical care by the gastroenterology group here.  She was in AF in the 80s but had been  symptomatic for the 4 days prior so she was scheduled to undergo DCCV yesterday.  When she presented, however, she was in sinus rhythm.    Her medical history is notable for hypertension, hyperlipidemia, migraines, depression & anxiety.    Current cardiovascular medications include Amiodarone 400 mg daily, rivaroxaban, losartan 50 mg daily, metoprolol XL 50 mg daily, HCTZ 25 mg daily, pravastatin 20 mg daily.  She has tolerated rivaroxaban without significant bleeding issues.    EP visit 17  Patient here post ablation in July.  Still feels that she is having her symptoms of palpitations and fatigue, but they are not nearly as severe.  Her toprol was increased to BID and losartan was decreased to 25mg.  Has not really noticed much change in her symptoms since that change.  She did increase her losartan back to 50mg daily due to some higher blood pressures that are better controlled now.      No bleeding issues with pradaxa, recently had knee injections for arthritis. Does want to switch back to xarelto because she is having some nuisance bruising.    Wants to come off amiodarone reading more about side affects.    EKG here with sinus bradycardia, normal intervals.    Past Medical History:   Diagnosis Date     Anxiety      Arthritis      Atrial fibrillation (H)      Constipation      Depression      Fibromuscular dysplasia (H)      Gastroesophageal reflux disease      HLD (hyperlipidemia)      HTN (hypertension)      Intraparenchymal renal artery disease (H)      Migraines      Palpitation      PVC's (premature ventricular contractions)      Past Surgical History:   Procedure Laterality Date     AS PTA RENAL/VISCERAL ARTERY Right      C/SECTION, CLASSICAL        SECTION       FUSION CERVICAL ANTERIOR ONE LEVEL      c3-C4     H ABLATION FOCAL AFIB       HYSTERECTOMY       ORTHOPEDIC SURGERY      knee arthroscopy bilaterally      TONSILLECTOMY       Family History   Problem Relation Age of Onset     CANCER  "Mother      Asthma Mother      Heart Murmur Sister      prolapse     Depression Sister      Coronary Artery Disease Father      Social History     Social History     Marital status:      Spouse name: N/A     Number of children: N/A     Years of education: N/A     Occupational History     Not on file.     Social History Main Topics     Smoking status: Never Smoker     Smokeless tobacco: Never Used     Alcohol use Yes      Comment: rare      Drug use: No     Sexual activity: Yes     Other Topics Concern     Not on file     Social History Narrative     No Known Allergies     Examination:  /81 (BP Location: Left arm, Patient Position: Chair, Cuff Size: Adult Large)  Pulse 61  Ht 1.778 m (5' 10\")  Wt 112 kg (247 lb)  SpO2 96%  BMI 35.44 kg/m2  General appearance:  Comfortable-appearing female with unlabored breathing.  Neuro:  Alert & fully oriented.  Eyes:  Anicteric sclerae.  Conjugative gaze.  Neck:  Supple.  JVP is not elevated.  CV:  RRR with normal s1 and s2 and no murmur or rub.  Warm extremities with mild non-pitting edema.  Lungs:  Clear on ascultation.  Abdomen:  Soft.  Not tender or distended.  Skin:  No petechiae/ecchymoses.  Not jaundiced.    Labs, Imaging & Procedures were reviewed.    HESHAM  Interpretation Summary  Left ventricular function, chamber size, wall motion, and wall thickness are  normal.The EF is 55-60%.  The left atrial appendage is normal. It is free of spontaneous echo contrast  and thrombus.    Cardiac MR     1. The LV is normal in cavity size and wall thickness. The global systolic function is normal. The LVEF is 61%. There are no regional wall motion abnormalities.     2. The RV is normal in cavity size. The global systolic function is normal. The RVEF is 70%.      3. Both atria are normal in size.     4. No intracardiac thrombus is visualized.     5. Late gadolinium enhancement imaging shows no MI, fibrosis or infiltrative disease.      6.  A pulmonary venous MRA was " performed.  Four pulmonary veins are noted draining into the left atrium  with measurements as described below.  There is no evidence of accessory or anomalous venous drainage.   There is no evidence of pulmonary venous stenosis.      Impression & Plan:  Ms Hernandez is a 58 yo female with symptomatic, paroxysmal atrial fibrillation despite treatment with amiodarone, intolerant of negative chronotropic agents (lightheadedness), and unable to use class Ic antiarrhythmics because she could not undergo a stress test (RVR) to exclude flecainide toxicity.  She under PVI ablation 8/17/17. She feels she might have some palpitations infrequently.      Stroke prophylaxis:  CHADSVasc score is 2 for hypertension and female gender.  Was on xarelto, switched to pradaxa for the procedure, will switch back to xarelto now per patient preference.    Rate control:  Continue metoprolol XL 50 mg BID.  Was increased after patient started feeling recurrent symptoms.    Rhythm control, indicated because she is symptomatic even when rate controlled:  She under PVI ablation 8/17/17. Still on amiodarone 200mg daily, will decrease to 100mg daily.  Will get ziopatch to assess if she still having AF    Seen and discussed with Dr. Doll  Follow up in 3 months.    Paula Duval MD PGY4  Cardiology Fellow  [phone]    EP STAFF NOTE  Patient seen and examined and management plan personally reviewed with the patient. I agree with the note above by the CV/EP fellow.  Husam Doll MD [facility]  Cardiology - Electrophysiology

## 2017-12-18 DIAGNOSIS — I48.0 PAROXYSMAL ATRIAL FIBRILLATION (H): Primary | ICD-10-CM

## 2017-12-18 RX ORDER — LIDOCAINE 40 MG/G
CREAM TOPICAL
Status: CANCELLED | OUTPATIENT
Start: 2017-12-18

## 2018-01-09 ENCOUNTER — CARE COORDINATION (OUTPATIENT)
Dept: CARDIOLOGY | Facility: CLINIC | Age: 58
End: 2018-01-09

## 2018-01-09 DIAGNOSIS — I48.0 PAROXYSMAL ATRIAL FIBRILLATION (H): Primary | ICD-10-CM

## 2018-01-09 NOTE — PROGRESS NOTES
Spoke to patient and discussed transition from Xarelto to Pradaxa prior to AF ablation scheduled on 2/6/18. Transition to occur 2 weeks prior to ablation. Requires Pradaxa at least 1 month post before transition back to Xarelto. Reviewed information pharmacy liaison provided (below) - patient states she has copay card she will use.    Pt has 2 week supply of Pradaxa at home. She will begin on 1/23/17. Will send 1 mo Rx on 1/29/18 to have for post procedure.    Patient also states she has had recent symptoms:  -Multiple AF episodes. Last night: lasted a couple of hours, HRs 130s. She drank a coke prior and she has been trying to cut out sugar.  - Sharp pain in chest since AF episode last night. She states that she is continuing to experience this hourly today. Denies radiation, SOB, dizziness. Believes it comes upon exertion only. She states she has had this before, but not so constantly.     Will review current symptoms with Ana Bella NP and contact patient if any further evaluation is needed. Reviewed signs and symptoms of heart attack and when to seek emergency care.     Patient verbalized understanding and is in agreement to the plan.            Re prior authorization, per pharmacy liason,   [1/9/2018 2:19 PM] Karen Sampson:   A PA is not needed. Cost came back pretty high due to her having a deductible to meet ($407.74).  [1/9/2018 2:22 PM] Karen Sampson:   And when I look at it closer, it should be cheaper if she fills with [facility]. [facility] is out of network for her and her insurance is charging her an out of network fee.        Date: 1/12/2018    Time of Call: 2:08 PM     Diagnosis:  Chest pain     [ TORB ] Ordering provider: Ana Bella NP    Order:   Seems unlikely to be cardiac, but if she is still having exertional chest pain would recommend CT angio.   Thanks,   LVW      Order received by: Rach Montenegro RN     Follow-up/additional notes: Spoke with patient. Symptoms have subsided since we  spoke on 1/9/18. Pt does not want to pursue testing now, but will call if she experiences exertional chest pain and will pursue above testing.

## 2018-01-21 ENCOUNTER — HEALTH MAINTENANCE LETTER (OUTPATIENT)
Age: 58
End: 2018-01-21

## 2018-01-25 ENCOUNTER — CARE COORDINATION (OUTPATIENT)
Dept: CARDIOLOGY | Facility: CLINIC | Age: 58
End: 2018-01-25

## 2018-01-25 DIAGNOSIS — I48.91 ATRIAL FIBRILLATION (H): Primary | ICD-10-CM

## 2018-01-25 RX ORDER — METOPROLOL SUCCINATE 50 MG/1
50 TABLET, EXTENDED RELEASE ORAL 2 TIMES DAILY
Qty: 180 TABLET | Refills: 3 | Status: SHIPPED | OUTPATIENT
Start: 2018-01-25 | End: 2018-03-14

## 2018-01-29 PROBLEM — I48.0 PAROXYSMAL ATRIAL FIBRILLATION (H): Status: ACTIVE | Noted: 2018-01-29

## 2018-01-29 RX ORDER — DABIGATRAN ETEXILATE 150 MG/1
150 CAPSULE ORAL 2 TIMES DAILY
Qty: 60 CAPSULE | Refills: 0 | Status: SHIPPED | OUTPATIENT
Start: 2018-01-29 | End: 2018-11-07

## 2018-02-05 ENCOUNTER — ANESTHESIA EVENT (OUTPATIENT)
Dept: SURGERY | Facility: CLINIC | Age: 58
End: 2018-02-05
Payer: COMMERCIAL

## 2018-02-06 ENCOUNTER — APPOINTMENT (OUTPATIENT)
Dept: CARDIOLOGY | Facility: CLINIC | Age: 58
End: 2018-02-06
Attending: NURSE PRACTITIONER
Payer: COMMERCIAL

## 2018-02-06 ENCOUNTER — HOSPITAL ENCOUNTER (OUTPATIENT)
Dept: CARDIOLOGY | Facility: CLINIC | Age: 58
End: 2018-02-06
Attending: NURSE PRACTITIONER | Admitting: INTERNAL MEDICINE
Payer: COMMERCIAL

## 2018-02-06 ENCOUNTER — HOSPITAL ENCOUNTER (OUTPATIENT)
Facility: CLINIC | Age: 58
Discharge: HOME | End: 2018-02-07
Attending: INTERNAL MEDICINE | Admitting: INTERNAL MEDICINE
Payer: COMMERCIAL

## 2018-02-06 ENCOUNTER — ANESTHESIA (OUTPATIENT)
Dept: SURGERY | Facility: CLINIC | Age: 58
End: 2018-02-06
Payer: COMMERCIAL

## 2018-02-06 DIAGNOSIS — I48.0 PAROXYSMAL ATRIAL FIBRILLATION (H): ICD-10-CM

## 2018-02-06 PROBLEM — Z98.890 S/P ABLATION OF ATRIAL FIBRILLATION: Status: ACTIVE | Noted: 2017-08-17

## 2018-02-06 PROBLEM — Z86.79 S/P ABLATION OF ATRIAL FIBRILLATION: Status: ACTIVE | Noted: 2017-08-17

## 2018-02-06 LAB
ABO + RH BLD: NORMAL
ABO + RH BLD: NORMAL
ANION GAP SERPL CALCULATED.3IONS-SCNC: 8 MMOL/L (ref 3–14)
B-HCG SERPL-ACNC: 3 IU/L (ref 0–5)
BLD GP AB SCN SERPL QL: NORMAL
BLOOD BANK CMNT PATIENT-IMP: NORMAL
BUN SERPL-MCNC: 15 MG/DL (ref 7–30)
CALCIUM SERPL-MCNC: 8.8 MG/DL (ref 8.5–10.1)
CHLORIDE SERPL-SCNC: 105 MMOL/L (ref 94–109)
CO2 SERPL-SCNC: 29 MMOL/L (ref 20–32)
CREAT SERPL-MCNC: 0.8 MG/DL (ref 0.52–1.04)
ERYTHROCYTE [DISTWIDTH] IN BLOOD BY AUTOMATED COUNT: 13.3 % (ref 10–15)
GFR SERPL CREATININE-BSD FRML MDRD: 74 ML/MIN/1.7M2
GLUCOSE BLDC GLUCOMTR-MCNC: 102 MG/DL (ref 70–99)
GLUCOSE SERPL-MCNC: 101 MG/DL (ref 70–99)
HCT VFR BLD AUTO: 41.6 % (ref 35–47)
HGB BLD-MCNC: 13.3 G/DL (ref 11.7–15.7)
INR PPP: 1.32 (ref 0.86–1.14)
KCT BLD-ACNC: 196 SEC (ref 105–167)
KCT BLD-ACNC: 363 SEC (ref 105–167)
KCT BLD-ACNC: 432 SEC (ref 105–167)
KCT BLD-ACNC: 485 SEC (ref 105–167)
MCH RBC QN AUTO: 30.9 PG (ref 26.5–33)
MCHC RBC AUTO-ENTMCNC: 32 G/DL (ref 31.5–36.5)
MCV RBC AUTO: 97 FL (ref 78–100)
PLATELET # BLD AUTO: 191 10E9/L (ref 150–450)
POTASSIUM SERPL-SCNC: 4.2 MMOL/L (ref 3.4–5.3)
RBC # BLD AUTO: 4.31 10E12/L (ref 3.8–5.2)
SODIUM SERPL-SCNC: 142 MMOL/L (ref 133–144)
SPECIMEN EXP DATE BLD: NORMAL
WBC # BLD AUTO: 6.4 10E9/L (ref 4–11)

## 2018-02-06 PROCEDURE — C1732 CATH, EP, DIAG/ABL, 3D/VECT: HCPCS

## 2018-02-06 PROCEDURE — C1893 INTRO/SHEATH, FIXED,NON-PEEL: HCPCS

## 2018-02-06 PROCEDURE — 93662 INTRACARDIAC ECG (ICE): CPT | Mod: 26 | Performed by: INTERNAL MEDICINE

## 2018-02-06 PROCEDURE — 27210789 ZZH NEEDLE BRK TRANSEPTAL CR12

## 2018-02-06 PROCEDURE — 86900 BLOOD TYPING SEROLOGIC ABO: CPT | Performed by: NURSE PRACTITIONER

## 2018-02-06 PROCEDURE — 25000128 H RX IP 250 OP 636: Performed by: NURSE PRACTITIONER

## 2018-02-06 PROCEDURE — 36415 COLL VENOUS BLD VENIPUNCTURE: CPT | Performed by: NURSE PRACTITIONER

## 2018-02-06 PROCEDURE — 27210841 ZZH MANIFOLD CR5

## 2018-02-06 PROCEDURE — 27210796 ZZH PAD EXTRNAL REFRENCE CARDIAC MAPPING CR14

## 2018-02-06 PROCEDURE — 93005 ELECTROCARDIOGRAM TRACING: CPT

## 2018-02-06 PROCEDURE — C1731 CATH, EP, 20 OR MORE ELEC: HCPCS

## 2018-02-06 PROCEDURE — 71000014 ZZH RECOVERY PHASE 1 LEVEL 2 FIRST HR

## 2018-02-06 PROCEDURE — 93010 ELECTROCARDIOGRAM REPORT: CPT | Mod: 76 | Performed by: INTERNAL MEDICINE

## 2018-02-06 PROCEDURE — 93662 INTRACARDIAC ECG (ICE): CPT

## 2018-02-06 PROCEDURE — 86901 BLOOD TYPING SEROLOGIC RH(D): CPT | Performed by: NURSE PRACTITIONER

## 2018-02-06 PROCEDURE — 82962 GLUCOSE BLOOD TEST: CPT

## 2018-02-06 PROCEDURE — 85027 COMPLETE CBC AUTOMATED: CPT | Performed by: NURSE PRACTITIONER

## 2018-02-06 PROCEDURE — C9399 UNCLASSIFIED DRUGS OR BIOLOG: HCPCS | Performed by: NURSE ANESTHETIST, CERTIFIED REGISTERED

## 2018-02-06 PROCEDURE — 93613 INTRACARDIAC EPHYS 3D MAPG: CPT

## 2018-02-06 PROCEDURE — 27210795 ZZH PAD DEFIB QUICK CR4

## 2018-02-06 PROCEDURE — 86850 RBC ANTIBODY SCREEN: CPT | Performed by: NURSE PRACTITIONER

## 2018-02-06 PROCEDURE — C1730 CATH, EP, 19 OR FEW ELECT: HCPCS

## 2018-02-06 PROCEDURE — 93320 DOPPLER ECHO COMPLETE: CPT | Mod: 26 | Performed by: INTERNAL MEDICINE

## 2018-02-06 PROCEDURE — 27210807 ZZH SHEATH CR6

## 2018-02-06 PROCEDURE — 27210856 ZZH ACCESS HEART CATH CR2

## 2018-02-06 PROCEDURE — 25000128 H RX IP 250 OP 636: Performed by: NURSE ANESTHETIST, CERTIFIED REGISTERED

## 2018-02-06 PROCEDURE — 93613 INTRACARDIAC EPHYS 3D MAPG: CPT | Mod: GC | Performed by: INTERNAL MEDICINE

## 2018-02-06 PROCEDURE — 93325 DOPPLER ECHO COLOR FLOW MAPG: CPT | Mod: 26 | Performed by: INTERNAL MEDICINE

## 2018-02-06 PROCEDURE — 25000565 ZZH ISOFLURANE, EA 15 MIN

## 2018-02-06 PROCEDURE — 85610 PROTHROMBIN TIME: CPT | Performed by: NURSE PRACTITIONER

## 2018-02-06 PROCEDURE — 25000132 ZZH RX MED GY IP 250 OP 250 PS 637: Performed by: NURSE PRACTITIONER

## 2018-02-06 PROCEDURE — C1759 CATH, INTRA ECHOCARDIOGRAPHY: HCPCS

## 2018-02-06 PROCEDURE — 40000170 ZZH STATISTIC PRE-PROCEDURE ASSESSMENT II

## 2018-02-06 PROCEDURE — 93325 DOPPLER ECHO COLOR FLOW MAPG: CPT

## 2018-02-06 PROCEDURE — 25000128 H RX IP 250 OP 636: Performed by: RESIDENTIAL TREATMENT FACILITY, PHYSICAL DISABILITIES

## 2018-02-06 PROCEDURE — 93312 ECHO TRANSESOPHAGEAL: CPT | Mod: 26 | Performed by: INTERNAL MEDICINE

## 2018-02-06 PROCEDURE — 37000009 ZZH ANESTHESIA TECHNICAL FEE, EACH ADDTL 15 MIN

## 2018-02-06 PROCEDURE — 93656 COMPRE EP EVAL ABLTJ ATR FIB: CPT

## 2018-02-06 PROCEDURE — 80048 BASIC METABOLIC PNL TOTAL CA: CPT | Performed by: NURSE PRACTITIONER

## 2018-02-06 PROCEDURE — 84702 CHORIONIC GONADOTROPIN TEST: CPT | Performed by: NURSE PRACTITIONER

## 2018-02-06 PROCEDURE — 93623 PRGRMD STIMJ&PACG IV RX NFS: CPT | Mod: 26 | Performed by: INTERNAL MEDICINE

## 2018-02-06 PROCEDURE — 76376 3D RENDER W/INTRP POSTPROCES: CPT | Mod: 26 | Performed by: INTERNAL MEDICINE

## 2018-02-06 PROCEDURE — 25000125 ZZHC RX 250: Performed by: NURSE ANESTHETIST, CERTIFIED REGISTERED

## 2018-02-06 PROCEDURE — 85347 COAGULATION TIME ACTIVATED: CPT

## 2018-02-06 PROCEDURE — 27210901 ZZH ACCESS EP PROC CR7

## 2018-02-06 PROCEDURE — 40000065 ZZH STATISTIC EKG NON-CHARGEABLE

## 2018-02-06 PROCEDURE — C1894 INTRO/SHEATH, NON-LASER: HCPCS

## 2018-02-06 PROCEDURE — 27210946 ZZH KIT HC TOTES DISP CR8

## 2018-02-06 PROCEDURE — 37000008 ZZH ANESTHESIA TECHNICAL FEE, 1ST 30 MIN

## 2018-02-06 RX ORDER — ADENOSINE 3 MG/ML
INJECTION, SOLUTION INTRAVENOUS PRN
Status: DISCONTINUED | OUTPATIENT
Start: 2018-02-06 | End: 2018-02-06

## 2018-02-06 RX ORDER — LIDOCAINE 40 MG/G
CREAM TOPICAL
Status: DISCONTINUED | OUTPATIENT
Start: 2018-02-06 | End: 2018-02-07 | Stop reason: HOSPADM

## 2018-02-06 RX ORDER — HYDROMORPHONE HYDROCHLORIDE 1 MG/ML
.3-.5 INJECTION, SOLUTION INTRAMUSCULAR; INTRAVENOUS; SUBCUTANEOUS EVERY 10 MIN PRN
Status: DISCONTINUED | OUTPATIENT
Start: 2018-02-06 | End: 2018-02-06

## 2018-02-06 RX ORDER — ONDANSETRON 2 MG/ML
4 INJECTION INTRAMUSCULAR; INTRAVENOUS EVERY 30 MIN PRN
Status: DISCONTINUED | OUTPATIENT
Start: 2018-02-06 | End: 2018-02-06

## 2018-02-06 RX ORDER — FENTANYL CITRATE 50 UG/ML
INJECTION, SOLUTION INTRAMUSCULAR; INTRAVENOUS PRN
Status: DISCONTINUED | OUTPATIENT
Start: 2018-02-06 | End: 2018-02-06

## 2018-02-06 RX ORDER — VENLAFAXINE HYDROCHLORIDE 150 MG/1
150 CAPSULE, EXTENDED RELEASE ORAL EVERY EVENING
Status: DISCONTINUED | OUTPATIENT
Start: 2018-02-06 | End: 2018-02-07 | Stop reason: HOSPADM

## 2018-02-06 RX ORDER — NALOXONE HYDROCHLORIDE 0.4 MG/ML
.1-.4 INJECTION, SOLUTION INTRAMUSCULAR; INTRAVENOUS; SUBCUTANEOUS
Status: DISCONTINUED | OUTPATIENT
Start: 2018-02-06 | End: 2018-02-07 | Stop reason: HOSPADM

## 2018-02-06 RX ORDER — SODIUM CHLORIDE, SODIUM LACTATE, POTASSIUM CHLORIDE, CALCIUM CHLORIDE 600; 310; 30; 20 MG/100ML; MG/100ML; MG/100ML; MG/100ML
INJECTION, SOLUTION INTRAVENOUS CONTINUOUS
Status: DISCONTINUED | OUTPATIENT
Start: 2018-02-06 | End: 2018-02-07 | Stop reason: HOSPADM

## 2018-02-06 RX ORDER — FENTANYL CITRATE 50 UG/ML
25-50 INJECTION, SOLUTION INTRAMUSCULAR; INTRAVENOUS EVERY 5 MIN PRN
Status: DISCONTINUED | OUTPATIENT
Start: 2018-02-06 | End: 2018-02-06 | Stop reason: HOSPADM

## 2018-02-06 RX ORDER — FENTANYL CITRATE 50 UG/ML
25-50 INJECTION, SOLUTION INTRAMUSCULAR; INTRAVENOUS
Status: DISCONTINUED | OUTPATIENT
Start: 2018-02-06 | End: 2018-02-06

## 2018-02-06 RX ORDER — ASPIRIN 81 MG/1
81 TABLET ORAL EVERY MORNING
Status: DISCONTINUED | OUTPATIENT
Start: 2018-02-07 | End: 2018-02-07 | Stop reason: HOSPADM

## 2018-02-06 RX ORDER — NALOXONE HYDROCHLORIDE 0.4 MG/ML
.1-.4 INJECTION, SOLUTION INTRAMUSCULAR; INTRAVENOUS; SUBCUTANEOUS
Status: DISCONTINUED | OUTPATIENT
Start: 2018-02-06 | End: 2018-02-06

## 2018-02-06 RX ORDER — DABIGATRAN ETEXILATE 150 MG/1
150 CAPSULE ORAL 2 TIMES DAILY
Status: DISCONTINUED | OUTPATIENT
Start: 2018-02-06 | End: 2018-02-07 | Stop reason: HOSPADM

## 2018-02-06 RX ORDER — LIDOCAINE 40 MG/G
CREAM TOPICAL
Status: DISCONTINUED | OUTPATIENT
Start: 2018-02-06 | End: 2018-02-06 | Stop reason: HOSPADM

## 2018-02-06 RX ORDER — PROTAMINE SULFATE 10 MG/ML
INJECTION, SOLUTION INTRAVENOUS PRN
Status: DISCONTINUED | OUTPATIENT
Start: 2018-02-06 | End: 2018-02-06

## 2018-02-06 RX ORDER — ONDANSETRON 4 MG/1
4 TABLET, ORALLY DISINTEGRATING ORAL EVERY 30 MIN PRN
Status: DISCONTINUED | OUTPATIENT
Start: 2018-02-06 | End: 2018-02-06

## 2018-02-06 RX ORDER — SODIUM CHLORIDE, SODIUM LACTATE, POTASSIUM CHLORIDE, CALCIUM CHLORIDE 600; 310; 30; 20 MG/100ML; MG/100ML; MG/100ML; MG/100ML
INJECTION, SOLUTION INTRAVENOUS CONTINUOUS PRN
Status: DISCONTINUED | OUTPATIENT
Start: 2018-02-06 | End: 2018-02-06

## 2018-02-06 RX ORDER — METOPROLOL SUCCINATE 25 MG/1
50 TABLET, EXTENDED RELEASE ORAL 2 TIMES DAILY
Status: DISCONTINUED | OUTPATIENT
Start: 2018-02-06 | End: 2018-02-07 | Stop reason: HOSPADM

## 2018-02-06 RX ORDER — MEPERIDINE HYDROCHLORIDE 25 MG/ML
12.5 INJECTION INTRAMUSCULAR; INTRAVENOUS; SUBCUTANEOUS
Status: DISCONTINUED | OUTPATIENT
Start: 2018-02-06 | End: 2018-02-06

## 2018-02-06 RX ORDER — ADENOSINE 3 MG/ML
12 INJECTION, SOLUTION INTRAVENOUS ONCE
Status: COMPLETED | OUTPATIENT
Start: 2018-02-06 | End: 2018-02-06

## 2018-02-06 RX ORDER — ONDANSETRON 2 MG/ML
4 INJECTION INTRAMUSCULAR; INTRAVENOUS EVERY 6 HOURS PRN
Status: DISCONTINUED | OUTPATIENT
Start: 2018-02-06 | End: 2018-02-07 | Stop reason: HOSPADM

## 2018-02-06 RX ORDER — LOSARTAN POTASSIUM 25 MG/1
25 TABLET ORAL 2 TIMES DAILY
Status: DISCONTINUED | OUTPATIENT
Start: 2018-02-06 | End: 2018-02-07 | Stop reason: HOSPADM

## 2018-02-06 RX ORDER — PRAVASTATIN SODIUM 20 MG
20 TABLET ORAL EVERY EVENING
Status: DISCONTINUED | OUTPATIENT
Start: 2018-02-06 | End: 2018-02-07 | Stop reason: HOSPADM

## 2018-02-06 RX ORDER — AMIODARONE HYDROCHLORIDE 100 MG/1
100 TABLET ORAL DAILY
Status: DISCONTINUED | OUTPATIENT
Start: 2018-02-07 | End: 2018-02-07 | Stop reason: HOSPADM

## 2018-02-06 RX ORDER — GLYCOPYRROLATE 0.2 MG/ML
INJECTION, SOLUTION INTRAMUSCULAR; INTRAVENOUS PRN
Status: DISCONTINUED | OUTPATIENT
Start: 2018-02-06 | End: 2018-02-06

## 2018-02-06 RX ORDER — ONDANSETRON 2 MG/ML
INJECTION INTRAMUSCULAR; INTRAVENOUS PRN
Status: DISCONTINUED | OUTPATIENT
Start: 2018-02-06 | End: 2018-02-06

## 2018-02-06 RX ORDER — PROPOFOL 10 MG/ML
INJECTION, EMULSION INTRAVENOUS PRN
Status: DISCONTINUED | OUTPATIENT
Start: 2018-02-06 | End: 2018-02-06

## 2018-02-06 RX ORDER — DEXAMETHASONE SODIUM PHOSPHATE 10 MG/ML
INJECTION, SOLUTION INTRAMUSCULAR; INTRAVENOUS PRN
Status: DISCONTINUED | OUTPATIENT
Start: 2018-02-06 | End: 2018-02-06

## 2018-02-06 RX ORDER — AMOXICILLIN 250 MG
2 CAPSULE ORAL EVERY MORNING
Status: DISCONTINUED | OUTPATIENT
Start: 2018-02-07 | End: 2018-02-07 | Stop reason: HOSPADM

## 2018-02-06 RX ORDER — OXYCODONE AND ACETAMINOPHEN 5; 325 MG/1; MG/1
1-2 TABLET ORAL EVERY 4 HOURS PRN
Status: DISCONTINUED | OUTPATIENT
Start: 2018-02-06 | End: 2018-02-07 | Stop reason: HOSPADM

## 2018-02-06 RX ORDER — HYDROCHLOROTHIAZIDE 12.5 MG/1
25 CAPSULE ORAL DAILY
Status: DISCONTINUED | OUTPATIENT
Start: 2018-02-07 | End: 2018-02-07 | Stop reason: HOSPADM

## 2018-02-06 RX ORDER — SODIUM CHLORIDE 9 MG/ML
INJECTION, SOLUTION INTRAVENOUS CONTINUOUS PRN
Status: DISCONTINUED | OUTPATIENT
Start: 2018-02-06 | End: 2018-02-06

## 2018-02-06 RX ORDER — HEPARIN SODIUM 1000 [USP'U]/ML
INJECTION, SOLUTION INTRAVENOUS; SUBCUTANEOUS PRN
Status: DISCONTINUED | OUTPATIENT
Start: 2018-02-06 | End: 2018-02-06

## 2018-02-06 RX ADMIN — ADENOSINE 12 MG: 3 INJECTION, SOLUTION INTRAVENOUS at 12:48

## 2018-02-06 RX ADMIN — OXYCODONE HYDROCHLORIDE AND ACETAMINOPHEN 1 TABLET: 5; 325 TABLET ORAL at 14:52

## 2018-02-06 RX ADMIN — PROPOFOL 120 MG: 10 INJECTION, EMULSION INTRAVENOUS at 09:31

## 2018-02-06 RX ADMIN — HEPARIN SODIUM 13250 UNITS: 1000 INJECTION, SOLUTION INTRAVENOUS; SUBCUTANEOUS at 11:03

## 2018-02-06 RX ADMIN — SODIUM CHLORIDE, POTASSIUM CHLORIDE, SODIUM LACTATE AND CALCIUM CHLORIDE: 600; 310; 30; 20 INJECTION, SOLUTION INTRAVENOUS at 09:09

## 2018-02-06 RX ADMIN — ONDANSETRON 4 MG: 2 INJECTION INTRAMUSCULAR; INTRAVENOUS at 12:58

## 2018-02-06 RX ADMIN — GLYCOPYRROLATE 0.2 MG: 0.2 INJECTION, SOLUTION INTRAMUSCULAR; INTRAVENOUS at 09:35

## 2018-02-06 RX ADMIN — RANITIDINE 150 MG: 150 TABLET, FILM COATED ORAL at 20:44

## 2018-02-06 RX ADMIN — METOPROLOL SUCCINATE 50 MG: 25 TABLET, EXTENDED RELEASE ORAL at 20:43

## 2018-02-06 RX ADMIN — LOSARTAN POTASSIUM 25 MG: 25 TABLET, FILM COATED ORAL at 20:44

## 2018-02-06 RX ADMIN — DABIGATRAN ETEXILATE MESYLATE 150 MG: 150 CAPSULE ORAL at 20:44

## 2018-02-06 RX ADMIN — OXYCODONE HYDROCHLORIDE AND ACETAMINOPHEN 1 TABLET: 5; 325 TABLET ORAL at 16:53

## 2018-02-06 RX ADMIN — OXYCODONE HYDROCHLORIDE AND ACETAMINOPHEN 1 TABLET: 5; 325 TABLET ORAL at 23:32

## 2018-02-06 RX ADMIN — MIDAZOLAM 1 MG: 1 INJECTION INTRAMUSCULAR; INTRAVENOUS at 09:16

## 2018-02-06 RX ADMIN — OXYCODONE HYDROCHLORIDE AND ACETAMINOPHEN 1 TABLET: 5; 325 TABLET ORAL at 19:07

## 2018-02-06 RX ADMIN — ROCURONIUM BROMIDE 10 MG: 10 INJECTION INTRAVENOUS at 09:45

## 2018-02-06 RX ADMIN — SUGAMMADEX 200 MG: 100 INJECTION, SOLUTION INTRAVENOUS at 13:02

## 2018-02-06 RX ADMIN — SODIUM CHLORIDE: 9 INJECTION, SOLUTION INTRAVENOUS at 13:24

## 2018-02-06 RX ADMIN — SODIUM CHLORIDE, POTASSIUM CHLORIDE, SODIUM LACTATE AND CALCIUM CHLORIDE: 600; 310; 30; 20 INJECTION, SOLUTION INTRAVENOUS at 17:48

## 2018-02-06 RX ADMIN — FENTANYL CITRATE 50 MCG: 50 INJECTION, SOLUTION INTRAMUSCULAR; INTRAVENOUS at 10:53

## 2018-02-06 RX ADMIN — ROCURONIUM BROMIDE 40 MG: 10 INJECTION INTRAVENOUS at 09:33

## 2018-02-06 RX ADMIN — ROCURONIUM BROMIDE 10 MG: 10 INJECTION INTRAVENOUS at 10:21

## 2018-02-06 RX ADMIN — VENLAFAXINE HYDROCHLORIDE 150 MG: 150 CAPSULE, EXTENDED RELEASE ORAL at 20:48

## 2018-02-06 RX ADMIN — DEXAMETHASONE SODIUM PHOSPHATE 6 MG: 10 INJECTION, SOLUTION INTRAMUSCULAR; INTRAVENOUS at 12:48

## 2018-02-06 RX ADMIN — FENTANYL CITRATE 75 MCG: 50 INJECTION, SOLUTION INTRAMUSCULAR; INTRAVENOUS at 09:29

## 2018-02-06 RX ADMIN — PROTAMINE SULFATE 5 MG: 10 INJECTION, SOLUTION INTRAVENOUS at 12:49

## 2018-02-06 RX ADMIN — FENTANYL CITRATE 25 MCG: 50 INJECTION, SOLUTION INTRAMUSCULAR; INTRAVENOUS at 09:45

## 2018-02-06 RX ADMIN — PROTAMINE SULFATE 45 MG: 10 INJECTION, SOLUTION INTRAVENOUS at 12:53

## 2018-02-06 RX ADMIN — PRAVASTATIN SODIUM 20 MG: 20 TABLET ORAL at 20:45

## 2018-02-06 RX ADMIN — ADENOSINE 12 MG: 3 INJECTION, SOLUTION INTRAVENOUS at 12:43

## 2018-02-06 RX ADMIN — ROCURONIUM BROMIDE 10 MG: 10 INJECTION INTRAVENOUS at 11:51

## 2018-02-06 RX ADMIN — MIDAZOLAM 1 MG: 1 INJECTION INTRAMUSCULAR; INTRAVENOUS at 09:09

## 2018-02-06 RX ADMIN — ROCURONIUM BROMIDE 20 MG: 10 INJECTION INTRAVENOUS at 11:06

## 2018-02-06 ASSESSMENT — VISUAL ACUITY
OU: BASELINE
OU: BASELINE

## 2018-02-06 ASSESSMENT — ENCOUNTER SYMPTOMS: DYSRHYTHMIAS: 1

## 2018-02-06 ASSESSMENT — PAIN DESCRIPTION - DESCRIPTORS: DESCRIPTORS: HEADACHE

## 2018-02-06 NOTE — ANESTHESIA POSTPROCEDURE EVALUATION
Patient: Barb Hernandez    Procedure(s):  Anesthesia Coverage Cath Lab Ablation, Transesophageal Echo @0930   - Wound Class: II-Clean Contaminated    Diagnosis:Atrial Fibrillation   Diagnosis Additional Information: No value filed.    Anesthesia Type:  General, ETT    Note:  Anesthesia Post Evaluation    Patient location during evaluation: PACU  Patient participation: Able to fully participate in evaluation  Level of consciousness: awake and alert  Pain management: adequate  Airway patency: patent  Cardiovascular status: acceptable  Respiratory status: acceptable  Hydration status: acceptable  PONV: none     Anesthetic complications: None          Last vitals:  Vitals:    02/06/18 1330 02/06/18 1345 02/06/18 1400   BP: 133/78 128/68 115/65   Pulse:      Resp: 12 13 12   Temp: 37.1  C (98.7  F)  37.1  C (98.7  F)   SpO2: 97% 94% 95%         Electronically Signed By: Golden Guadarrama MD  February 6, 2018  2:13 PM

## 2018-02-06 NOTE — OP NOTE
EP PROCEDURE NOTE    Procedures:  1. Left Atrial Wide Area Circumferential radiofrequency Ablation- Redo PV Ablation  2. Trans-septal Puncture x1  3. Intracardiac Echocardiography.  4. Invasive Hemodynamic Monitoring.  5. 3D electro-anatomic Mapping using Carto3.  6. Adenosine infusion.    Attending: Dr. Doll  EP Fellow: Dr. Gan  Procedure Date: 2/6/2018    Pre-operative Diagnosis:  Paroxysmal Atrial Fibrillaltion resistant to AAT.  Post-operative diagnosis:  PAF s/p Ablation of  Left superior pulmonary vein reconnection   Complications:   None.  Fluoroscopy time/dose: 5.9 minutes, 696.3 cGycm2.    Clinical Profile:  See H&P    PROCEDURE  The risks and benefits of the procedure were explained to the patient in full.  The risks include, but are not limited to: pain, bleeding, blood transfusion reactions, arrhythmia, dissection of vessels, cardiac perforation, pericardial effusion, stroke, and death. Informed Consent was obtained. The patient was brought to the EP lab in a fasting and hemodynamically stable condition. The patient was prepped and draped in a sterile fashion. This procedure was done under general anesthesia.  Sheaths and Catheters:  After local anesthesia with 2% lidocaine, vascular access was obtained using the modified Seldinger technique for the following access points:    Right Femoral Vein:   - 7Fr Locking Sheath: A decapolar catheter was positioned into the coronary sinus.    - 8Fr Sheath: Exchanged for an SL1 trans-septal Sheath through which a Biosense Gomez Thermacool Smartouch 3.5 mm tip DF curve mapping and ablation catheter was placed into the LA, and a Pentarray mapping catheter was also placed into the LA.  - 8Fr Sheath:     Left Femoral Vein:  - 9 Fr Sheath - an ICE catheter was placed into the RA / RV.    Right Femoral Artery:   - 4Fr Sheath was placed for hemodynamic monitoring.    EP study results (in milliseconds):  Pre-ablation: In Afib/Sinus rhythm, AA= 942, VV= 942, HI= 166,  QRS= 92, QT= 491.  Post-ablation: AA=VV= 1003, SD= 161, QRS= 91, QT= 525.  AVERP= 600/340, AERP= <600/350/210    Procedure Description:  After the above sheaths were in place, the catheters were positioned under fluoroscopic guidance. The ICE catheter was placed into the RA.  Baseline survey of the heart with the ICE did not reveal the presence of any significant pericardial effusion.  A temperature probe was placed in the esophagus.  We then performed the trans-septal access:  The guide wire was advanced into the SVC.  The SL1 trans-septal sheath and dilator were advanced over the guide wire into the SVC and directed medially.  The guide wire was removed, the dilator was aspirated and the BRK-1 needle was advanced. Using both the L[place]ON and [place] projections, the trans-septal system was then dragged down and the Fossa Ovalis was engaged. Tenting of the interatrial septum was observed with ICE.  The needle was advanced into the LA and the location confirmed using ICE.  While fixing the needle, the dilator was slightly advanced across the septum. The dilator and then the sheath were advanced though the septum into the LA. The trans-septal needle was then withdrawn from the dilator. The dilator was removed and the sheath was then flushed. A Heparin bolus and drip were started, with serial monitoring of ACTs to keep ACT around 350.     We used the Pentarray and ablation catheters to build the LA, pulmonary vein and LA appendage geometry with FAM on Carto3.  The left atrial Reynolds map indicated that the right veins were isolated, the body including roof posterior and mitral isthmus were healthy with normal voltages, but there appeared to be a leak at the left superior roof/appendage region.    To isolate the leak we paced from CS 1 to and identified the earliest second potential, we did find split fractionated second potentials and actually ablated at the earliest fractionated potential sites while the large atrial component  was slightly late at this site.  The leak was likely a slanted roof to Coumadin ridge leak.  After ablating at the this site we obtained immediate isolation of the left superior vein but did continue along the Coumadin ridge and superiorly to the prior ablation line.      The ablation was used to confirm electrical isolation of the pulmonary veins.  Provocation:  - Adenosine was given in an intravenous bolus. No atrial fibrillation or atrial tachycardia was induced. NO reconnection of the veins was observed.  - Atrial programmed extrastimulation was performed without induction of any atrial arrhythmias.    The sheaths were pulled out of the left atrium into the RA.  Heparin was stopped and protamine was given, after a test dose had revealed no reaction.  The catheters were withdrawn, and the sheaths were pulled.  Manual pressure was applied to both groins. The patient was then transported to PACU for extubation then to a monitored bed.     ASSESSMENT:  1. Symptomatic Paroxysmal  Atrial Fibrillation, refractory to AAT.    2. Successful repeat pulmonary vein isolation with entry and exit block.    PLAN:  1. Continue oral anticoagulation tonight (Pradaxa).  2. Transfer to a monitored bed.    Jayden Gan M.D.  Cardiac Electrophysiology Fellow    EP STAFF NOTE  I was present throughout the procedure. I agree with the note above by the EP fellow.  Husam Doll MD [facility]  Cardiology - Electrophysiology

## 2018-02-06 NOTE — IP AVS SNAPSHOT
MRN:[id number]                      After Visit Summary   2/6/2018    Barb Hernandez    MRN: [id number]           Thank you!     Thank you for choosing [facility] for your care. Our goal is always to provide you with excellent care. Hearing back from our patients is one way we can continue to improve our services. Please take a few minutes to complete the written survey that you may receive in the mail after you visit with us. Thank you!        Patient Information     Date Of Birth          1960        About your hospital stay     You were admitted on:  February 6, 2018 You last received care in the:  Unit 6D Observation [facility]    You were discharged on:  February 7, 2018       Who to Call     For medical emergencies, please call 911.  For non-urgent questions about your medical care, please call your primary care provider or clinic, [phone]  For questions related to your surgery, please call your surgery clinic        Attending Provider     Provider Specialty    Husam Doll MD Clinical Cardiac Electrophysiology       Primary Care Provider Office Phone # Fax #    Mindy Gaudencio Castañeda [phone]      Your next 10 appointments already scheduled     May 09, 2018 11:00 AM CDT   (Arrive by 10:45 AM)   RETURN ARRHYTHMIA with ADRIANNA Rizzo [facility] ([facility])    [address] [place] [address]  [address] [place] [id number] [phone]              Further instructions from your care team           Care of groin site:         Remove the Band-Aid after 24 hours. If there is minor oozing, apply another Band-aid and remove it after 12 hours.          Do NOT take a bath, use a hot tub, pool, or submerse in water for at least 3 days You may shower.          It is normal to have a small bruise or lump at the site.         Do not scrub the site.         Do not use lotion or powder near the puncture site for 3 days.          For the first 2 days: Do not stoop or squat. When you cough, sneeze or move your bowels, hold your hand over the puncture site and press gently.         Do not lift more than 10 pounds or exertional activity for 10 days.      If you start bleeding from the site in your groin:  Lie down flat and press firmly on the site.  Call your physician immediately, or, come to the emergency room.    Call 911 right away if you have bleeding that is heavy or does not stop.     Call your doctor/provider if:         You have a large or growing hard lump around the site.         The site is red, swollen, hot or tender.         Blood or fluid is draining from the site.         You have chills or a fever greater than 101 F (38 C).         Your leg or arm turns bluish, feels numb or cool.         You have hives, a rash or unusual itching.     Cardiovascular Clinic:   [address] [place] [address]. [place] [address]  Your Care Team:  EP Cardiology   Telephone Number     Ana Doll [phone]   Rach Olmedo RN  [phone]     For scheduling appts or procedures:    Janae Jain   [phone]     As always, Thank you for trusting us with your health care needs          Additional Information     If you use hormonal birth control (such as the pill, patch, ring or implants): You'll need a second form of birth control for 7 days (condoms, a diaphragm or contraceptive foam). While in the hospital, you received a medicine called Bridion. Your normal birth control will not work as well for a week after taking this medicine.          Pending Results     Date and Time Order Name Status Description    2/6/2018 1342 EKG 12-lead, tracing only Preliminary     2/6/2018 0743 EKG 12-lead, tracing only Preliminary             Statement of Approval     Ordered          02/07/18 0810  I have reviewed and agree with all the recommendations and orders detailed in this document.  EFFECTIVE NOW     Approved  "and electronically signed by:  Ana Flores APRN CNP             Admission Information     Date & Time Provider Department Dept. Phone    2/6/2018 Husam Doll MD Unit 6D Observation [facility] [phone]      Your Vitals Were     Blood Pressure Pulse Temperature Respirations Height Weight    116/71 (BP Location: Left arm) 59 98  F (36.7  C) (Oral) 16 1.778 m (5' 10\") 110.2 kg (242 lb 15.2 oz)    Pulse Oximetry BMI (Body Mass Index)                98% 34.86 kg/m2          MyChart Information     [employer] gives you secure access to your electronic health record. If you see a primary care provider, you can also send messages to your care team and make appointments. If you have questions, please call your primary care clinic.  If you do not have a primary care provider, please call [phone] and they will assist you.        Care EveryWhere ID     This is your Care EveryWhere ID. This could be used by other organizations to access your [facility] medical records  [id number]        Equal Access to Services     TRICIA VELASCO : Hadii aad ku hadasho Sodarrin, waaxda luqadaha, qaybta kaalmada adeegyajeremiah, chantale velasquez . So [facility] [phone].    ATENCIÓN: Si habla español, tiene a vargas disposición servicios gratuitos de asistencia lingüística. Llame al [phone].    We comply with applicable federal civil rights laws and [place] laws. We do not discriminate on the basis of race, color, national origin, age, disability, sex, sexual orientation, or gender identity.               Review of your medicines      UNREVIEWED medicines. Ask your doctor about these medicines        Dose / Directions    amiodarone 200 MG tablet   Commonly known as:  PACERONE/CODARONE   Used for:  Paroxysmal atrial fibrillation (H)        Dose:  100 mg   Take 0.5 tablets (100 mg) by mouth daily   Quantity:  45 tablet   Refills:  1       aspirin 81 MG tablet        Dose:  81 mg   Take 81 mg by mouth every " morning   Refills:  0       dabigatran ANTICOAGULANT 150 MG capsule   Commonly known as:  PRADAXA ANTICOAGULANT   Used for:  Paroxysmal atrial fibrillation (H)        Dose:  150 mg   Take 1 capsule (150 mg) by mouth 2 times daily Store in original 's bottle or blister pack; use within 120 days of opening.   Quantity:  60 capsule   Refills:  0       HYDROCHLOROTHIAZIDE PO        Dose:  25 mg   Take 25 mg by mouth daily   Refills:  0       losartan 25 MG tablet   Commonly known as:  COZAAR   Used for:  Paroxysmal atrial fibrillation (H)        Dose:  25 mg   Take 1 tablet (25 mg) by mouth 2 times daily   Quantity:  180 tablet   Refills:  3       MAGNESIUM OXIDE PO        Dose:  400 mg   Take 400 mg by mouth daily   Refills:  0       MELATONIN PO        Take by mouth At Bedtime   Refills:  0       metoprolol succinate 50 MG 24 hr tablet   Commonly known as:  TOPROL-XL   Used for:  Atrial fibrillation (H)        Dose:  50 mg   Take 1 tablet (50 mg) by mouth 2 times daily   Quantity:  180 tablet   Refills:  3       PRAVASTATIN SODIUM PO        Dose:  20 mg   Take 20 mg by mouth every evening   Refills:  0       RANITIDINE HCL PO        Dose:  150 mg   Take 150 mg by mouth 2 times daily   Refills:  0       senna-docusate 8.6-50 MG per tablet   Commonly known as:  SENOKOT-S;PERICOLACE        Dose:  2 tablet   Take 2 tablets by mouth every morning   Refills:  0       TYLENOL PO        Dose:  650 mg   Take 650 mg by mouth every 4 hours as needed for mild pain or fever (for migraines)   Refills:  0       venlafaxine 150 MG Tb24 24 hr tablet   Commonly known as:  EFFEXOR-ER        Dose:  150 mg   Take 150 mg by mouth every evening   Refills:  0       VITAMIN D (CHOLECALCIFEROL) PO        Dose:  2000 Units   Take 2,000 Units by mouth daily   Refills:  0                Protect others around you: Learn how to safely use, store and throw away your medicines at www.disposemymeds.org.             Medication List: This is  a list of all your medications and when to take them. Check marks below indicate your daily home schedule. Keep this list as a reference.      Medications           Morning Afternoon Evening Bedtime As Needed    amiodarone 200 MG tablet   Commonly known as:  PACERONE/CODARONE   Take 0.5 tablets (100 mg) by mouth daily   Last time this was given:  100 mg on 2/7/2018  8:14 AM                                aspirin 81 MG tablet   Take 81 mg by mouth every morning                                dabigatran ANTICOAGULANT 150 MG capsule   Commonly known as:  PRADAXA ANTICOAGULANT   Take 1 capsule (150 mg) by mouth 2 times daily Store in original 's bottle or blister pack; use within 120 days of opening.   Last time this was given:  150 mg on 2/7/2018  8:13 AM                                HYDROCHLOROTHIAZIDE PO   Take 25 mg by mouth daily   Last time this was given:  25 mg on 2/7/2018  8:14 AM                                losartan 25 MG tablet   Commonly known as:  COZAAR   Take 1 tablet (25 mg) by mouth 2 times daily   Last time this was given:  25 mg on 2/7/2018  8:13 AM                                MAGNESIUM OXIDE PO   Take 400 mg by mouth daily                                MELATONIN PO   Take by mouth At Bedtime                                metoprolol succinate 50 MG 24 hr tablet   Commonly known as:  TOPROL-XL   Take 1 tablet (50 mg) by mouth 2 times daily   Last time this was given:  50 mg on 2/7/2018  8:13 AM                                PRAVASTATIN SODIUM PO   Take 20 mg by mouth every evening   Last time this was given:  20 mg on 2/6/2018  8:45 PM                                RANITIDINE HCL PO   Take 150 mg by mouth 2 times daily   Last time this was given:  150 mg on 2/7/2018  8:14 AM                                senna-docusate 8.6-50 MG per tablet   Commonly known as:  SENOKOT-S;PERICOLACE   Take 2 tablets by mouth every morning   Last time this was given:  2 tablets on 2/7/2018  8:13  AM                                TYLENOL PO   Take 650 mg by mouth every 4 hours as needed for mild pain or fever (for migraines)                                venlafaxine 150 MG Tb24 24 hr tablet   Commonly known as:  EFFEXOR-ER   Take 150 mg by mouth every evening                                VITAMIN D (CHOLECALCIFEROL) PO   Take 2,000 Units by mouth daily

## 2018-02-06 NOTE — H&P
Electrophysiology Pre-Procedure History and Physical    Barb Hernandez MRN# [id number]   Age: 57 year old YOB: 1960      Date of Procedure: 2/6/2018  Location [facility]      Date of Exam 2/6/2018 Facility (Same day)       Home clinic: [facility]  Primary care provider: Mindy Castañeda  HPI:  Ms. Hernandez is a 57 year old female who has a past medical significant for HTN, HLD, depression, PVCs, and PAF (CHADSVASC 2 on Xarelto switched to Pradaxa for ablation) s/p PVI ablation 8/17/17. She has previously been on Flecainide with recurrent AF and decision was made to pursue PVI. She was placed on amiodarone with goal for short course. She continued to have PAF despite treatment with amiodarone, intolerant of negative chronotropic agents (lightheadedness), and unable to use class Ic antiarrhythmics because she could not undergo a stress test (RVR) to exclude flecainide toxicity.  She under PVI ablation 8/17/17. She continued to have some PAF episodes and wishes to get off amiodarone. She elected to pursue repeat PVI for which she presents today.        Active problem list:   Patient Active Problem List    Diagnosis Date Noted     Paroxysmal atrial fibrillation (H) 01/29/2018     Priority: Medium     S/P ablation of atrial fibrillation 08/17/2017     Priority: Medium            Medications (include herbals and vitamins):   Any Plavix use in the last 7 days? No     Current Facility-Administered Medications   Medication     sodium chloride (PF) 0.9% PF flush 3 mL     sodium chloride (PF) 0.9% PF flush 3 mL     lidocaine (LMX4) kit     lidocaine 1 % 1 mL     sodium chloride (PF) 0.9% PF flush 3 mL     sodium chloride (PF) 0.9% PF flush 3 mL             Allergies:    No Known Allergies  Allergy to Latex? No  Allergy to tape?   No  Intolerances: NA          Social History:     Social History   Substance Use Topics     Smoking status: Never Smoker      "Smokeless tobacco: Never Used     Alcohol use Yes      Comment: rare             Physical Exam:   All vitals have been reviewed  Patient Vitals for the past 8 hrs:   BP Temp Temp src Pulse Heart Rate Resp SpO2 Height Weight   02/06/18 0722 (!) 135/91 98.2  F (36.8  C) Oral 59 58 16 94 % 1.778 m (5' 10\") 110.2 kg (242 lb 15.2 oz)        Airway assessment:   Patient is able to open mouth wide  Patient is able to stick out tongue}      ENT:   Normocephalic, without obvious abnormality, atraumatic, sinuses nontender on palpation, external ears without lesions, oral pharynx with moist mucous membranes, tonsils without erythema or exudates, gums normal and good dentition.     Neck:   Supple, symmetrical, trachea midline, no adenopathy, thyroid symmetric, not enlarged and no tenderness, skin normal     Lungs:   No increased work of breathing, good air exchange, clear to auscultation bilaterally, no crackles or wheezing     Cardiovascular:   Normal apical impulse, regular rate and rhythm, normal S1 and S2, no S3 or S4, and no murmur noted             Lab / Radiology Results:     Lab Results   Component Value Date    WBC 6.4 02/06/2018    RBC 4.31 02/06/2018    HGB 13.3 02/06/2018    HCT 41.6 02/06/2018    MCV 97 02/06/2018    RDW 13.3 02/06/2018     02/06/2018      Lab Results   Component Value Date    WBC 6.4 02/06/2018     Lab Results   Component Value Date     02/06/2018     Lab Results   Component Value Date    HGB 13.3 02/06/2018    HCT 41.6 02/06/2018     Lab Results   Component Value Date     02/06/2018    CO2 29 02/06/2018    BUN 15 02/06/2018             Plan:   Patient's active problems diagnostically and therapeutically optimized for the planned procedure  Patient here for PVI ablation for PAF. Procedure explained in detail including indications, risks, and benefits. The procedural risk of EP study and ablation were discussed in detail. Risks discussed include: vascular complications, CVA, " AVB, pericardial effusion and tamponade, esophageal fistula, PV stenosis. Patient states understanding and is agreeable to proceed.     ADRIANNA Rojas CNP  Electrophysiology Consult Service  Pager: [phone]

## 2018-02-06 NOTE — DISCHARGE INSTRUCTIONS
Care of groin site:         Remove the Band-Aid after 24 hours. If there is minor oozing, apply another Band-aid and remove it after 12 hours.          Do NOT take a bath, use a hot tub, pool, or submerse in water for at least 3 days You may shower.          It is normal to have a small bruise or lump at the site.         Do not scrub the site.         Do not use lotion or powder near the puncture site for 3 days.         For the first 2 days: Do not stoop or squat. When you cough, sneeze or move your bowels, hold your hand over the puncture site and press gently.         Do not lift more than 10 pounds or exertional activity for 10 days.      If you start bleeding from the site in your groin:  Lie down flat and press firmly on the site.  Call your physician immediately, or, come to the emergency room.    Call 911 right away if you have bleeding that is heavy or does not stop.     Call your doctor/provider if:         You have a large or growing hard lump around the site.         The site is red, swollen, hot or tender.         Blood or fluid is draining from the site.         You have chills or a fever greater than 101 F (38 C).         Your leg or arm turns bluish, feels numb or cool.         You have hives, a rash or unusual itching.     Cardiovascular Clinic:   [address] [place] [address]. [place] [address]  Your Care Team:  EP Cardiology   Telephone Number     Ana Doll [phone]   Rach Olmedo RN  [phone]     For scheduling appts or procedures:    Janae Jain   [phone]     As always, Thank you for trusting us with your health care needs

## 2018-02-06 NOTE — ANESTHESIA PREPROCEDURE EVALUATION
Anesthesia Evaluation     . Pt has had prior anesthetic. Type: General    No history of anesthetic complications          ROS/MED HX    ENT/Pulmonary:  - neg pulmonary ROS     Neurologic:     (+)migraines,     Cardiovascular:     (+) Dyslipidemia, hypertension----. Taking blood thinners Pt has received instructions: . . . :. dysrhythmias (s/p A. Fib. ablation 8/2017) a-fib, . Previous cardiac testing date:results:date: results:ECG reviewed date:2/6/18 results:SB date: results:          METS/Exercise Tolerance:  >4 METS   Hematologic:  - neg hematologic  ROS       Musculoskeletal:  - neg musculoskeletal ROS       GI/Hepatic:     (+) GERD       Renal/Genitourinary:  - ROS Renal section negative       Endo:  - neg endo ROS       Psychiatric:     (+) psychiatric history anxiety and depression      Infectious Disease:  - neg infectious disease ROS       Malignancy:      - no malignancy   Other:                     Physical Exam  Normal systems: cardiovascular and pulmonary    Airway   Mallampati: II  TM distance: >3 FB  Neck ROM: full    Dental   (+) chipped  Comment: Chipped upper front tooth      Cardiovascular       Pulmonary                     Anesthesia Plan      History & Physical Review  History and physical reviewed and following examination; no interval change.    ASA Status:  3 .    NPO Status:  > 8 hours    Plan for General and ETT with Intravenous induction. Maintenance will be Balanced.    PONV prophylaxis:  Ondansetron (or other 5HT-3) and Dexamethasone or Solumedrol  Additional equipment: 2nd IV and Arterial Line      Postoperative Care  Postoperative pain management:  Multi-modal analgesia.      Consents  Anesthetic plan, risks, benefits and alternatives discussed with:  Patient..                          .

## 2018-02-06 NOTE — ANESTHESIA CARE TRANSFER NOTE
Patient: Barb Hernandez    Procedure(s):  Anesthesia Coverage Cath Lab Ablation, Transesophageal Echo @0930   - Wound Class: II-Clean Contaminated    Diagnosis: Atrial Fibrillation   Diagnosis Additional Information: No value filed.    Anesthesia Type:   General, ETT     Note:  Airway :Face Mask  Patient transferred to:PACU  Comments:   -on 6L O2 via FM, Pt Spont.  breathing, awake & alert, monitors placed, RN at bedside,VSS, no airway      Vitals: (Last set prior to Anesthesia Care Transfer)    CRNA VITALS  2/6/2018 1255 - 2/6/2018 1334      2/6/2018             Resp Rate (observed): 16    EKG: Sinus rhythm                Electronically Signed By: ADRIANNA Church CRNA  February 6, 2018  1:34 PM

## 2018-02-06 NOTE — ANESTHESIA POSTPROCEDURE EVALUATION
Patient: Barb Hernandez    Procedure(s):  Anesthesia Coverage Cath Lab Ablation, Transesophageal Echo @0930   - Wound Class: II-Clean Contaminated    Diagnosis:Atrial Fibrillation   Diagnosis Additional Information: No value filed.    Anesthesia Type:  General, ETT    Note:  Anesthesia Post Evaluation    Patient location during evaluation: PACU  Patient participation: Able to fully participate in evaluation  Level of consciousness: awake  Pain management: adequate  Airway patency: patent  Cardiovascular status: acceptable  Respiratory status: acceptable  Hydration status: acceptable  PONV: none     Anesthetic complications: None          Last vitals:  Vitals:    02/06/18 1400 02/06/18 1415 02/06/18 1430   BP: 115/65 111/61 113/60   Pulse:      Resp: 12 12 13   Temp: 37.1  C (98.7  F)     SpO2: 95% 96% 96%         Electronically Signed By: Ashu Buitrago MD  February 6, 2018  2:32 PM

## 2018-02-06 NOTE — IP AVS SNAPSHOT
Unit 6D Observation [address] [address]    Phone:  [phone]    Fax:  [phone]                                       After Visit Summary   2/6/2018    Barb Hernandez    MRN: [id number]           After Visit Summary Signature Page     I have received my discharge instructions, and my questions have been answered. I have discussed any challenges I see with this plan with the nurse or doctor.    ..........................................................................................................................................  Patient/Patient Representative Signature      ..........................................................................................................................................  Patient Representative Print Name and Relationship to Patient    ..................................................               ................................................  Date                                            Time    ..........................................................................................................................................  Reviewed by Signature/Title    ...................................................              ..............................................  Date                                                            Time

## 2018-02-06 NOTE — LETTER
[facility]  4th Floor, Clinic [address]  [place] [address]  Phone: [phone]  Fax: [phone]         To Whom It May Concern:           This letter is to notify you that Ms. Hernandez underwent a procedure at the [facility] on 2/6/18. She may return to work on 2/19/18 without restrictions. Please feel free to contact us if you have any questions or concerns.             Sincerely,             Husam Doll MD  Cardiology/Electropyhsiology  Phone: [phone]  Pager: [phone]

## 2018-02-06 NOTE — ANESTHESIA POSTPROCEDURE EVALUATION
Patient: Barb Hernandez    Procedure(s):  Anesthesia Coverage Cath Lab Ablation, Transesophageal Echo @0930   - Wound Class: II-Clean Contaminated    Diagnosis:Atrial Fibrillation   Diagnosis Additional Information: No value filed.    Anesthesia Type:  General, ETT    Note:  Anesthesia Post Evaluation    Patient location during evaluation: PACU  Patient participation: Able to fully participate in evaluation  Level of consciousness: awake  Pain management: adequate  Airway patency: patent  Cardiovascular status: acceptable  Respiratory status: acceptable  Hydration status: acceptable  PONV: none     Anesthetic complications: None          Last vitals:  Vitals:    02/06/18 1400 02/06/18 1415 02/06/18 1430   BP: 115/65 111/61 113/60   Pulse:      Resp: 12 12 13   Temp: 37.1  C (98.7  F)     SpO2: 95% 96% 96%         Electronically Signed By: Ashu Buitrago MD  February 6, 2018  2:37 PM

## 2018-02-07 ENCOUNTER — APPOINTMENT (OUTPATIENT)
Dept: CARDIOLOGY | Facility: CLINIC | Age: 58
End: 2018-02-07
Attending: INTERNAL MEDICINE
Payer: COMMERCIAL

## 2018-02-07 VITALS
DIASTOLIC BLOOD PRESSURE: 71 MMHG | OXYGEN SATURATION: 98 % | HEIGHT: 70 IN | RESPIRATION RATE: 16 BRPM | WEIGHT: 242.95 LBS | TEMPERATURE: 98 F | HEART RATE: 59 BPM | SYSTOLIC BLOOD PRESSURE: 116 MMHG | BODY MASS INDEX: 34.78 KG/M2

## 2018-02-07 LAB
INTERPRETATION ECG - MUSE: NORMAL
INTERPRETATION ECG - MUSE: NORMAL

## 2018-02-07 PROCEDURE — 99213 OFFICE O/P EST LOW 20 MIN: CPT | Performed by: INTERNAL MEDICINE

## 2018-02-07 PROCEDURE — 25000132 ZZH RX MED GY IP 250 OP 250 PS 637: Performed by: NURSE PRACTITIONER

## 2018-02-07 PROCEDURE — 93656 COMPRE EP EVAL ABLTJ ATR FIB: CPT | Mod: GC | Performed by: INTERNAL MEDICINE

## 2018-02-07 RX ADMIN — HYDROCHLOROTHIAZIDE 25 MG: 12.5 CAPSULE ORAL at 08:14

## 2018-02-07 RX ADMIN — DABIGATRAN ETEXILATE MESYLATE 150 MG: 150 CAPSULE ORAL at 08:13

## 2018-02-07 RX ADMIN — SENNOSIDES AND DOCUSATE SODIUM 2 TABLET: 8.6; 5 TABLET ORAL at 08:13

## 2018-02-07 RX ADMIN — METOPROLOL SUCCINATE 50 MG: 25 TABLET, EXTENDED RELEASE ORAL at 08:13

## 2018-02-07 RX ADMIN — AMIODARONE HYDROCHLORIDE 100 MG: 100 TABLET ORAL at 08:14

## 2018-02-07 RX ADMIN — RANITIDINE 150 MG: 150 TABLET, FILM COATED ORAL at 08:14

## 2018-02-07 RX ADMIN — LOSARTAN POTASSIUM 25 MG: 25 TABLET, FILM COATED ORAL at 08:13

## 2018-02-07 RX ADMIN — OXYCODONE HYDROCHLORIDE AND ACETAMINOPHEN 1 TABLET: 5; 325 TABLET ORAL at 02:47

## 2018-02-07 RX ADMIN — ASPIRIN 81 MG: 81 TABLET, COATED ORAL at 08:14

## 2018-02-07 RX ADMIN — OXYCODONE HYDROCHLORIDE AND ACETAMINOPHEN 1 TABLET: 5; 325 TABLET ORAL at 08:20

## 2018-02-07 RX ADMIN — OXYCODONE HYDROCHLORIDE AND ACETAMINOPHEN 1 TABLET: 5; 325 TABLET ORAL at 04:04

## 2018-02-07 ASSESSMENT — PAIN DESCRIPTION - DESCRIPTORS
DESCRIPTORS: HEADACHE
DESCRIPTORS: DISCOMFORT

## 2018-02-07 NOTE — PLAN OF CARE
Problem: Patient Care Overview  Goal: Plan of Care/Patient Progress Review  [id number]  Post cardiac ablation with R and L groin sites WDL. VSS; NSR on telemetry. Minimal pain to groin sites, PRN Percocet given. Experiencing headaches often. Up to restroom. Voiding adequately after [place] removal, no longer measuring.  IVF turned off; patient taking adequate PO. Hopeful for discharge today.

## 2018-02-07 NOTE — PROGRESS NOTES
PIV removed. Tele removed; tele notified. All discharge instructions reviewed with patient. Patient is agreeable to plan and states no further questions at this time. No new medications at this time. Patient left unit via wheelchair with all belongings and family will provide transport home.

## 2018-02-07 NOTE — DISCHARGE SUMMARY
"  Electrophysiology Discharge Summary  Date of Procedure: 2/6/18  DOS: 2/7/2018   Pre-operative Diagnosis:  Paroxysmal Atrial Fibrillaltion resistant to AAT.  Post-operative diagnosis:  PAF s/p Ablation of  Left superior pulmonary vein reconnection   Hospital Course:  Ms. Hernandez is a 57 year old female who has a past medical significant for HTN, HLD, depression, PVCs, and PAF (CHADSVASC 2 on Xarelto switched to Pradaxa for ablation) s/p PVI ablation 8/17/17. She has previously been on Flecainide with recurrent AF and decision was made to pursue PVI. She was placed on amiodarone with goal for short course. She continued to have PAF despite treatment with amiodarone, intolerant of negative chronotropic agents (lightheadedness), and unable to use class Ic antiarrhythmics because she could not undergo a stress test (RVR) to exclude flecainide toxicity.  She under PVI ablation 8/17/17. She continued to have some PAF episodes and wishes to get off amiodarone. She elected to pursue repeat PVI for which she presents today.   Patient underwent a successful repeat PVI ablation yesterday. She had an uneventful overnight stay. Telemetry reviewed showing SR with rare ectopy. Groin sites are soft, CDI, no bruit, no bleeding, and no hematoma. Patient is tolerating oral intake, ambulating at baseline, and voiding without difficulties. Patient remains hemodynamically stable.     ROS:   10 point ROS neg other than the symptoms noted above.   Exam:  /62 (BP Location: Left arm)  Pulse 59  Temp 97.9  F (36.6  C) (Oral)  Resp 16  Ht 1.778 m (5' 10\")  Wt 110.2 kg (242 lb 15.2 oz)  SpO2 98%  BMI 34.86 kg/m2    Constitutional: healthy, alert and no distress  Head: Normocephalic. No masses, lesions, tenderness or abnormalities  Neck: Neck supple. No adenopathy. Thyroid symmetric, normal size,, Carotids without bruits.  ENT: ENT exam normal, no neck nodes or sinus tenderness  Cardiovascular: negative, PMI normal. No lifts, " heaves, or thrills. RRR. No murmurs, clicks gallops or rub  Respiratory: negative, Percussion normal. Good diaphragmatic excursion. Lungs clear  Gastrointestinal: Abdomen soft, non-tender. BS normal. No masses, organomegaly  : Deferred  Musculoskeletal: extremities normal- no gross deformities noted, gait normal and normal muscle tone  Skin: no suspicious lesions or rashes  Neurologic: Gait normal. Reflexes normal and symmetric. Sensation grossly WNL.  Psychiatric: mentation appears normal and affect normal/bright  Hematologic/Lymphatic/Immunologic: Normal cervical lymph nodes    Discharge Medications:   Rosalee Hernandezty Barbara   Home Medication Instructions DOUGLAS:[id number]    Printed on:02/07/18 3891   Medication Information                      Acetaminophen (TYLENOL PO)  Take 650 mg by mouth every 4 hours as needed for mild pain or fever (for migraines)             amiodarone (PACERONE/CODARONE) 200 MG tablet  Take 0.5 tablets (100 mg) by mouth daily             aspirin 81 MG tablet  Take 81 mg by mouth every morning             dabigatran ANTICOAGULANT (PRADAXA ANTICOAGULANT) 150 MG capsule  Take 1 capsule (150 mg) by mouth 2 times daily Store in original 's bottle or blister pack; use within 120 days of opening.             HYDROCHLOROTHIAZIDE PO  Take 25 mg by mouth daily             losartan (COZAAR) 25 MG tablet  Take 1 tablet (25 mg) by mouth 2 times daily             MAGNESIUM OXIDE PO  Take 400 mg by mouth daily             MELATONIN PO  Take by mouth At Bedtime             metoprolol succinate (TOPROL-XL) 50 MG 24 hr tablet  Take 1 tablet (50 mg) by mouth 2 times daily             PRAVASTATIN SODIUM PO  Take 20 mg by mouth every evening              RANITIDINE HCL PO  Take 150 mg by mouth 2 times daily             senna-docusate (SENOKOT-S;PERICOLACE) 8.6-50 MG per tablet  Take 2 tablets by mouth every morning              venlafaxine (EFFEXOR-ER) 150 MG TB24 24 hr tablet  Take 150 mg by mouth  every evening              VITAMIN D, CHOLECALCIFEROL, PO  Take 2,000 Units by mouth daily                 Patient Instructions:    Care of groin site:         Remove the Band-Aid after 24 hours. If there is minor oozing, apply another Band-aid and remove it after 12 hours.          Do NOT take a bath, use a hot tub, pool, or submerse in water for at least 3 days You may shower.          It is normal to have a small bruise or lump at the site.         Do not scrub the site.         Do not use lotion or powder near the puncture site for 3 days.         For the first 2 days: Do not stoop or squat. When you cough, sneeze or move your bowels, hold your hand over the puncture site and press gently.         Do not lift more than 10 pounds or exertional activity for 10 days.      If you start bleeding from the site in your groin:  Lie down flat and press firmly on the site.  Call your physician immediately, or, come to the emergency room.    Call 911 right away if you have bleeding that is heavy or does not stop.     Call your doctor/provider if:         You have a large or growing hard lump around the site.         The site is red, swollen, hot or tender.         Blood or fluid is draining from the site.         You have chills or a fever greater than 101 F (38 C).         Your leg or arm turns bluish, feels numb or cool.         You have hives, a rash or unusual itching.     Plan & Follow up:    Continue home medications without changes     Follow up in 3 months with Dr. Doll    Discharge to home    The patient states understanding and is agreeable with the plan.   This patient was seen and examined with Dr. Doll. The above note reflects our joint assessment and plan.     ADRIANNA Rojas CNP  Electrophysiology Consult Service  Pager: [phone]    EP STAFF NOTE  I have seen and examined the patient as part of a shared visit with ZEENAT Rojas NP.  I agree with the note above. I reviewed today's vital signs and  medications. I have reviewed and discussed with the advanced practice provider their physical examination, assessment, and plan   Briefly, s/p afib ablation, no complications.  My key history/exam findings are: RRR.   The key management decisions made by me: f/u as outpatient.    Husam Doll MD [facility]  Cardiology - Electrophysiology

## 2018-03-14 DIAGNOSIS — I10 BENIGN ESSENTIAL HYPERTENSION: Primary | ICD-10-CM

## 2018-03-14 DIAGNOSIS — I48.0 PAROXYSMAL ATRIAL FIBRILLATION (H): ICD-10-CM

## 2018-03-14 RX ORDER — CARVEDILOL 12.5 MG/1
12.5 TABLET ORAL 2 TIMES DAILY WITH MEALS
Qty: 180 TABLET | Refills: 3 | Status: SHIPPED | OUTPATIENT
Start: 2018-03-14 | End: 2018-03-27

## 2018-03-14 NOTE — PROGRESS NOTES
Briefly, Ms. Hernandez is a 57 year old female who has a past medical significant for HTN, HLD, depression, PVCs, and PAF (CHADSVASC 2 on Xarelto switched to Pradaxa for ablation) s/p PVI ablation 8/17/17 and 2/7/18. She has previously been on Flecainide with recurrent AF and decision was made to pursue PVI. She was placed on amiodarone with goal for short course. She continued to have PAF despite treatment with amiodarone, intolerant of negative chronotropic agents (lightheadedness), and unable to use class Ic antiarrhythmics because she could not undergo a stress test (RVR) to exclude flecainide toxicity.  She under PVI ablation 8/17/17. She continued to have some PAF episodes and wishes to get off amiodarone. She elected to pursue repeat PVI which she had on 2/7/18.     She now calls reporting that her BPs have continued to be elevated 140-150's. Instructed her to stop Toprol Xl and Start Coreg 12.5 mg BID. Asked her to send us updated BPs in a few weeks to monitor efficacy of Coreg. She states understanding and is agreeable with the plan.     ADRIANNA Rojas CNP  Electrophysiology Consult Service  Pager: [phone]

## 2018-03-15 ENCOUNTER — CARE COORDINATION (OUTPATIENT)
Dept: CARDIOLOGY | Facility: CLINIC | Age: 58
End: 2018-03-15

## 2018-03-15 NOTE — PROGRESS NOTES
Received call from [facility] pharmacy notifying clinic of interaction between the Carvedilol and Pradaxa, the use of both could increase the efficacy of the Pradaxa.    Spoke to pre shirley Hernandez for patient to take Carvedilol.    [facility] pharmacy called back to discuss recommendations from Ana Bella, no further questions at this time.    Harry Sal, RN  RN Care Coordinator  [facility]  [phone]

## 2018-03-27 ENCOUNTER — DOCUMENTATION ONLY (OUTPATIENT)
Dept: CARDIOLOGY | Facility: CLINIC | Age: 58
End: 2018-03-27

## 2018-03-27 DIAGNOSIS — I48.0 PAROXYSMAL ATRIAL FIBRILLATION (H): ICD-10-CM

## 2018-03-27 DIAGNOSIS — I10 BENIGN ESSENTIAL HYPERTENSION: ICD-10-CM

## 2018-03-27 DIAGNOSIS — I48.0 PAF (PAROXYSMAL ATRIAL FIBRILLATION) (H): Primary | ICD-10-CM

## 2018-03-27 RX ORDER — CARVEDILOL 25 MG/1
25 TABLET ORAL 2 TIMES DAILY WITH MEALS
Qty: 180 TABLET | Refills: 3 | COMMUNITY
Start: 2018-03-27 | End: 2021-08-01

## 2018-03-27 RX ORDER — AMLODIPINE BESYLATE 5 MG/1
5 TABLET ORAL DAILY
Qty: 30 TABLET | Refills: 3 | Status: SHIPPED | OUTPATIENT
Start: 2018-03-27 | End: 2018-08-08

## 2018-03-27 RX ORDER — CARVEDILOL 12.5 MG/1
25 TABLET ORAL 2 TIMES DAILY WITH MEALS
Qty: 180 TABLET | Refills: 3 | COMMUNITY
Start: 2018-03-27 | End: 2018-03-27

## 2018-03-27 NOTE — PROGRESS NOTES
Per patient request, on March 27, 2018 an order was faxed to [facility] requesting a Zio patch to be mailed to the patient. The patient is to wear the zio 14 days ASAP.    Janae Jain  Periop Electrophysiology   [phone]

## 2018-03-27 NOTE — PROGRESS NOTES
Ms. Hernandez is a 57 year old female who has a past medical significant for HTN, HLD, depression, PVCs, and PAF (CHADSVASC 2 on Xarelto switched to Pradaxa for ablation) s/p PVI ablation 8/17/17 and 2/6/17. She called reporting her blood pressures were consistently running high and she was having headaches and feeling unwell with this. She states her SBP has been 160-180s's. She also reports that she is having some episodes of tachycardia. However, she states that she is not feeling her AF symptoms. She also reports a history of Fibromuscular dysplasia of renal artery but has not had any evaluations for this for years. We had not previously been aware of this diagnosis. She states when she had this before her BPs were out of control. Her renal functions here have been normal.   Plan:  - Start Norvasc 5 mg daily   - Patient to send updated BPs in 2 weeks.   - Continue other medications at current doses   - OK to switch back to Xarelto when current pradaxa supply is out  - mail ou zio patch to monitor for any arrhythmias   -Renal Ultra Sound to evaluate for any renal artery stenosis, secondary HTN evaluation   - follow up as scheduled with Dr. Doll.     ADRIANNA Rojas CNP  Electrophysiology Consult Service  Pager: [phone]

## 2018-03-29 ENCOUNTER — ALLIED HEALTH/NURSE VISIT (OUTPATIENT)
Dept: CARDIOLOGY | Facility: CLINIC | Age: 58
End: 2018-03-29
Attending: NURSE PRACTITIONER
Payer: COMMERCIAL

## 2018-03-29 DIAGNOSIS — I48.0 PAF (PAROXYSMAL ATRIAL FIBRILLATION) (H): ICD-10-CM

## 2018-03-29 PROCEDURE — 0298T ZZC EXT ECG > 48HR TO 21 DAY REVIEW AND INTERPRETATN: CPT | Performed by: INTERNAL MEDICINE

## 2018-03-29 NOTE — MR AVS SNAPSHOT
After Visit Summary   3/29/2018    Barb Hernandez    MRN: [id number]           Patient Information     Date Of Birth          1960        Visit Information        Provider Department      3/29/2018 7:00 AM Tech, [facility] Cvc Monitor, [facility]        Today's Diagnoses     PAF (paroxysmal atrial fibrillation) (H)           Follow-ups after your visit        Your next 10 appointments already scheduled     May 02, 2018  9:00 AM CDT   Lab with UC LAB   [facility] ([facility])    [address]  1st Floor  [facility] [address] [phone]            May 02, 2018  9:30 AM CDT   (Arrive by 9:15 AM)   RETURN ARRHYTHMIA with Husam Doll MD   [facility] ([facility])    [address]  [address]  [facility] [address] [phone]              Who to contact     If you have questions or need follow up information about today's clinic visit or your schedule please contact [facility] directly at [phone].  Normal or non-critical lab and imaging results will be communicated to you by The [employer], letter or phone within 4 business days after the clinic has received the results. If you do not hear from us within 7 days, please contact the clinic through Zdorov[employer] or phone. If you have a critical or abnormal lab result, we will notify you by phone as soon as possible.  Submit refill requests through Funzio or call your pharmacy and they will forward the refill request to us. Please allow 3 business days for your refill to be completed.          Additional Information About Your Visit        The [employer] Registry[employer] Information     [employer] gives you secure access to your electronic health record. If you see a primary care provider, you can also send messages to your care team and make appointments. If you have questions, please call your primary care clinic.  If you do not have a primary care provider, please call [phone]  and they will assist you.        Care EveryWhere ID     This is your Care EveryWhere ID. This could be used by other organizations to access your [facility] medical records  [id number]         Blood Pressure from Last 3 Encounters:   02/07/18 116/71   11/22/17 123/81   08/18/17 108/73    Weight from Last 3 Encounters:   02/06/18 110.2 kg (242 lb 15.2 oz)   11/22/17 112 kg (247 lb)   08/17/17 109.8 kg (242 lb 1 oz)              We Performed the Following     Zio Patch Holter        Primary Care Provider Office Phone # Fax #    Mindy Castañeda [phone]       [facility] [address]  [facility] [address]        Equal Access to Services     TRICIA VELASCO : Hadii aad ku hadasho Soomaali, waaxda luqadaha, qaybta kaalmada adeegyada, chantale rutherford. So [facility] [phone].    ATENCIÓN: Si habla español, tiene a vargas disposición servicios gratuitos de asistencia lingüística. Llame al [phone].    We comply with applicable federal civil rights laws and [place] laws. We do not discriminate on the basis of race, color, national origin, age, disability, sex, sexual orientation, or gender identity.            Thank you!     Thank you for choosing [facility]  for your care. Our goal is always to provide you with excellent care. Hearing back from our patients is one way we can continue to improve our services. Please take a few minutes to complete the written survey that you may receive in the mail after your visit with us. Thank you!             Your Updated Medication List - Protect others around you: Learn how to safely use, store and throw away your medicines at www.disposemymeds.org.          This list is accurate as of 3/29/18 11:59 PM.  Always use your most recent med list.                   Brand Name Dispense Instructions for use Diagnosis    amiodarone 200 MG tablet    PACERONE/CODARONE    45 tablet    Take 0.5 tablets (100 mg) by mouth  daily    Paroxysmal atrial fibrillation (H)       amLODIPine 5 MG tablet    NORVASC    30 tablet    Take 1 tablet (5 mg) by mouth daily    Benign essential hypertension       aspirin 81 MG tablet      Take 81 mg by mouth every morning        carvedilol 25 MG tablet    COREG    180 tablet    Take 1 tablet (25 mg) by mouth 2 times daily (with meals)    PAF (paroxysmal atrial fibrillation) (H)       dabigatran ANTICOAGULANT 150 MG capsule    PRADAXA ANTICOAGULANT    60 capsule    Take 1 capsule (150 mg) by mouth 2 times daily Store in original 's bottle or blister pack; use within 120 days of opening.    Paroxysmal atrial fibrillation (H)       HYDROCHLOROTHIAZIDE PO      Take 25 mg by mouth daily        losartan 25 MG tablet    COZAAR    180 tablet    Take 1 tablet (25 mg) by mouth 2 times daily    Paroxysmal atrial fibrillation (H)       MAGNESIUM OXIDE PO      Take 400 mg by mouth daily        MELATONIN PO      Take by mouth At Bedtime        PRAVASTATIN SODIUM PO      Take 20 mg by mouth every evening        RANITIDINE HCL PO      Take 150 mg by mouth 2 times daily        senna-docusate 8.6-50 MG per tablet    SENOKOT-S;PERICOLACE     Take 2 tablets by mouth every morning        TYLENOL PO      Take 650 mg by mouth every 4 hours as needed for mild pain or fever (for migraines)        venlafaxine 150 MG Tb24 24 hr tablet    EFFEXOR-ER     Take 150 mg by mouth every evening        VITAMIN D (CHOLECALCIFEROL) PO      Take 2,000 Units by mouth daily

## 2018-03-30 ENCOUNTER — RADIANT APPOINTMENT (OUTPATIENT)
Dept: ULTRASOUND IMAGING | Facility: CLINIC | Age: 58
End: 2018-03-30
Attending: NURSE PRACTITIONER
Payer: COMMERCIAL

## 2018-03-30 DIAGNOSIS — I10 BENIGN ESSENTIAL HYPERTENSION: ICD-10-CM

## 2018-04-26 ENCOUNTER — EXTERNAL ORDER RESULTS (OUTPATIENT)
Dept: CARE COORDINATION | Facility: CLINIC | Age: 58
End: 2018-04-26

## 2018-05-02 ENCOUNTER — OFFICE VISIT (OUTPATIENT)
Dept: CARDIOLOGY | Facility: CLINIC | Age: 58
End: 2018-05-02
Attending: NURSE PRACTITIONER
Payer: COMMERCIAL

## 2018-05-02 VITALS
OXYGEN SATURATION: 95 % | DIASTOLIC BLOOD PRESSURE: 83 MMHG | SYSTOLIC BLOOD PRESSURE: 130 MMHG | HEIGHT: 70 IN | BODY MASS INDEX: 35.46 KG/M2 | HEART RATE: 67 BPM | WEIGHT: 247.7 LBS

## 2018-05-02 DIAGNOSIS — I48.0 PAROXYSMAL ATRIAL FIBRILLATION (H): Primary | ICD-10-CM

## 2018-05-02 DIAGNOSIS — Z79.899 ON AMIODARONE THERAPY: ICD-10-CM

## 2018-05-02 LAB
ALBUMIN SERPL-MCNC: 3.7 G/DL (ref 3.4–5)
ALP SERPL-CCNC: 87 U/L (ref 40–150)
ALT SERPL W P-5'-P-CCNC: 25 U/L (ref 0–50)
AST SERPL W P-5'-P-CCNC: 18 U/L (ref 0–45)
BILIRUB DIRECT SERPL-MCNC: <0.1 MG/DL (ref 0–0.2)
BILIRUB SERPL-MCNC: 0.4 MG/DL (ref 0.2–1.3)
PROT SERPL-MCNC: 7.5 G/DL (ref 6.8–8.8)
TSH SERPL DL<=0.005 MIU/L-ACNC: 1.16 MU/L (ref 0.4–4)

## 2018-05-02 PROCEDURE — 36415 COLL VENOUS BLD VENIPUNCTURE: CPT | Performed by: INTERNAL MEDICINE

## 2018-05-02 PROCEDURE — 93010 ELECTROCARDIOGRAM REPORT: CPT | Mod: ZP | Performed by: INTERNAL MEDICINE

## 2018-05-02 PROCEDURE — 99214 OFFICE O/P EST MOD 30 MIN: CPT | Mod: GC | Performed by: INTERNAL MEDICINE

## 2018-05-02 PROCEDURE — 80076 HEPATIC FUNCTION PANEL: CPT | Performed by: INTERNAL MEDICINE

## 2018-05-02 PROCEDURE — 84443 ASSAY THYROID STIM HORMONE: CPT | Performed by: INTERNAL MEDICINE

## 2018-05-02 PROCEDURE — G0463 HOSPITAL OUTPT CLINIC VISIT: HCPCS | Mod: 25,ZF

## 2018-05-02 PROCEDURE — 93005 ELECTROCARDIOGRAM TRACING: CPT | Mod: ZF

## 2018-05-02 ASSESSMENT — PAIN SCALES - GENERAL: PAINLEVEL: NO PAIN (0)

## 2018-05-02 NOTE — PATIENT INSTRUCTIONS
Cardiology Provider you saw in clinic today: Dr. Doll    Labs/Tests needed:     1. Pulmonary Function Test in July     Follow-up Visit:  6 months with labs prior        Please contact us via [employer]) for questions or concerns.    Rach Montenegro RN   Electrophysiology Nurse Coordinator.  [phone]    If your question concerns the schedule/appointment times, contact:  [facility] Procedure   [phone]    Device Clinic (Pacemakers, ICD, Loop Recorders)   [phone]      You will receive all normal lab and testing results via [employer]PI ([employer]) or mail if not reviewed in clinic today.   If you need a medication refill please contact your pharmacy.    As always, thank you for trusting us with your health care needs!

## 2018-05-02 NOTE — MR AVS SNAPSHOT
After Visit Summary   5/2/2018    Barb Hernandez    MRN: [id number]           Patient Information     Date Of Birth          1960        Visit Information        Provider Department      5/2/2018 9:30 AM Husam Doll MD [facility]        Today's Diagnoses     Paroxysmal atrial fibrillation (H)    -  1    On amiodarone therapy          Care Instructions    Cardiology Provider you saw in clinic today: Dr. Doll    Labs/Tests needed:     1. Pulmonary Function Test in July     Follow-up Visit:  6 months with labs prior        Please contact us via [employer] for questions or concerns.    Rach Montenegro RN   Electrophysiology Nurse Coordinator.  [phone]    If your question concerns the schedule/appointment times, contact:  [facility] Procedure   [phone]    Device Clinic (Pacemakers, ICD, Loop Recorders)   [phone]      You will receive all normal lab and testing results via [employer] or mail if not reviewed in clinic today.   If you need a medication refill please contact your pharmacy.    As always, thank you for trusting us with your health care needs!            Follow-ups after your visit        Additional Services     Follow-Up with Electrophysiologist       Lavon Norris prior                  Your next 10 appointments already scheduled     May 11, 2018  7:30 AM CDT   Lab with  LAB   [facility] ([facility])    [address] [place] [address]  [address]st [facility] [address] [phone]            May 11, 2018  8:30 AM CDT   (Arrive by 8:00 AM)   New Patient Visit with Prabhjot Mark MD   [facility] ([facility])    [address] [place] [address]  [address]  [facility] [address] [phone]            Jul 09, 2018  7:00 AM CDT   PFT VISIT with  PFL A   [facility] ([facility])    [address] [place] [address]  3rd Floor  [facility] [address]    [phone]            Nov 07, 2018  7:30 AM CST   Lab with UC LAB   [facility] ([facility])    [address]  1st Floor  [facility] [address] [phone]            Nov 07, 2018  8:00 AM CST   (Arrive by 7:45 AM)   RETURN ARRHYTHMIA with Husam Doll MD   [facility] ([facility])    [address]  [address]  [facility] [address] [phone]              Future tests that were ordered for you today     Open Future Orders        Priority Expected Expires Ordered    TSH with free T4 reflex STAT 11/2/2018 5/2/2019 5/2/2018    Comprehensive metabolic panel STAT 11/2/2018 5/2/2019 5/2/2018    Pulmonary Function Test Routine 7/2/2018 5/2/2019 5/2/2018    Follow-Up with Electrophysiologist Routine 11/2/2018 5/2/2019 5/2/2018            Who to contact     If you have questions or need follow up information about today's clinic visit or your schedule please contact [facility] directly at [phone].  Normal or non-critical lab and imaging results will be communicated to you by Circuit[employer], letter or phone within 4 business days after the clinic has received the results. If you do not hear from us within 7 days, please contact the clinic through Circuit[employer] or phone. If you have a critical or abnormal lab result, we will notify you by phone as soon as possible.  Submit refill requests through [employer] or call your pharmacy and they will forward the refill request to us. Please allow 3 business days for your refill to be completed.          Additional Information About Your Visit        [employer]uit[employer] Information     [employer] gives you secure access to your electronic health record. If you see a primary care provider, you can also send messages to your care team and make appointments. If you have questions, please call your primary care clinic.  If you do not have a primary care provider, please call [phone] and they will assist you.    "     Care EveryWhere ID     This is your Care EveryWhere ID. This could be used by other organizations to access your [facility] medical records  [id number]        Your Vitals Were     Pulse Height Pulse Oximetry BMI (Body Mass Index)          67 1.778 m (5' 10\") 95% 35.54 kg/m2         Blood Pressure from Last 3 Encounters:   05/02/18 130/83   02/07/18 116/71   11/22/17 123/81    Weight from Last 3 Encounters:   05/02/18 112.4 kg (247 lb 11.2 oz)   02/06/18 110.2 kg (242 lb 15.2 oz)   11/22/17 112 kg (247 lb)              We Performed the Following     EKG 12-lead, tracing only (Same Day)        Primary Care Provider Office Phone # Fax #    Mindy Castañeda [phone]       [facility] [address]  [facility] [address]        Equal Access to Services     DONALD VELASCO : Hadii aad ku anno Sodarrin, waaxda luqadaha, qaybta kaalmada adeegyada, chantale velasquez . So [facility] [phone].    ATENCIÓN: Si habla español, tiene a vargas disposición servicios gratuitos de asistencia lingüística. Llame al [phone].    We comply with applicable federal civil rights laws and [place] laws. We do not discriminate on the basis of race, color, national origin, age, disability, sex, sexual orientation, or gender identity.            Thank you!     Thank you for choosing [facility]  for your care. Our goal is always to provide you with excellent care. Hearing back from our patients is one way we can continue to improve our services. Please take a few minutes to complete the written survey that you may receive in the mail after your visit with us. Thank you!             Your Updated Medication List - Protect others around you: Learn how to safely use, store and throw away your medicines at www.disposemymeds.org.          This list is accurate as of 5/2/18 10:46 AM.  Always use your most recent med list.                   Brand Name Dispense Instructions for " use Diagnosis    amiodarone 200 MG tablet    PACERONE/CODARONE    45 tablet    Take 0.5 tablets (100 mg) by mouth daily    Paroxysmal atrial fibrillation (H)       amLODIPine 5 MG tablet    NORVASC    30 tablet    Take 1 tablet (5 mg) by mouth daily    Benign essential hypertension       aspirin 81 MG tablet      Take 81 mg by mouth every morning        carvedilol 25 MG tablet    COREG    180 tablet    Take 1 tablet (25 mg) by mouth 2 times daily (with meals)    PAF (paroxysmal atrial fibrillation) (H)       dabigatran ANTICOAGULANT 150 MG capsule    PRADAXA ANTICOAGULANT    60 capsule    Take 1 capsule (150 mg) by mouth 2 times daily Store in original 's bottle or blister pack; use within 120 days of opening.    Paroxysmal atrial fibrillation (H)       HYDROCHLOROTHIAZIDE PO      Take 25 mg by mouth daily        losartan 25 MG tablet    COZAAR    180 tablet    Take 1 tablet (25 mg) by mouth 2 times daily    Paroxysmal atrial fibrillation (H)       MAGNESIUM OXIDE PO      Take 400 mg by mouth daily        MELATONIN PO      Take by mouth At Bedtime        PRAVASTATIN SODIUM PO      Take 20 mg by mouth every evening        RANITIDINE HCL PO      Take 150 mg by mouth 2 times daily        rivaroxaban ANTICOAGULANT 20 MG Tabs tablet    XARELTO     Take 1 Tab by mouth every evening with a meal.        senna-docusate 8.6-50 MG per tablet    SENOKOT-S;PERICOLACE     Take 2 tablets by mouth every morning        TYLENOL PO      Take 650 mg by mouth every 4 hours as needed for mild pain or fever (for migraines)        venlafaxine 150 MG Tb24 24 hr tablet    EFFEXOR-ER     Take 150 mg by mouth every evening        VITAMIN D (CHOLECALCIFEROL) PO      Take 2,000 Units by mouth daily

## 2018-05-02 NOTE — NURSING NOTE
Chief Complaint   Patient presents with     Follow Up For     3 mo f/u PVI.     Vitals were taken and medications were reconciled. EKG was performed.  AMRITA Gomes  9:36 AM

## 2018-05-02 NOTE — PROGRESS NOTES
CARDIAC ELECTROPHYSIOLOGY PATIENT VISIT    Ms Hernandez is a 56 yo female with symptomatic, paroxysmal atrial fibrillation who underwent successful PVI ablation 8/17/17.  She is here for follow up.    Prior HPI at EP Visit June 2017  HPI:  Ms Hernandez is a 56 yo female with symptomatic, paroxysmal atrial fibrillation referred for consideration of ablation.  She was diagnosed with paroxysmal atrial fibrillation in 2015 in the setting of an acute illness but even after recovering she continued to have paroxysms of atrial fibrillation prompting emergency department visits, especially over the past year.  Her symptoms include palpitations, dyspnea, chest discomfort, and weakness.  She has never undergone electrical cardioversion.  She was evaluated at [facility] where she was started on flecainide 50 mg BID but she developed RVR during her stress test and, because the risk of flecainide toxicity could not be excluded, it was discontinued.  Rate control with metoprolol & diltiazem has been impeded by lightheadedness even to the point of ED visits. She was started on amiodarone 400 mg with the intention of performing ablative peripheral vein isolation but she has continued to have episodic AF.  She says that she once again presented to an emergency department 1.5 weeks ago with AF but converted to sinus rhythm after a few doses of metoprolol.  Then she saw one of our NPs, Narda Benavidez, to transfer her care to the [place] because she and her  have been pleased with her husbands medical care by the gastroenterology group here.  She was in AF in the 80s but had been symptomatic for the 4 days prior so she was scheduled to undergo DCCV.  When she presented, however, she was in sinus rhythm.      Her medical history is notable for hypertension, hyperlipidemia, migraines, depression & anxiety.    Current cardiovascular medications include Amiodarone 400 mg daily, rivaroxaban, losartan 50 mg daily, metoprolol XL  50 mg daily, HCTZ 25 mg daily, pravastatin 20 mg daily.  She has tolerated rivaroxaban without significant bleeding issues.    EP visit 11/22/17  Patient here post ablation in July.  Still feels that she is having her symptoms of palpitations and fatigue, but they are not nearly as severe.  Her toprol was increased to BID and losartan was decreased to 25mg.  Has not really noticed much change in her symptoms since that change.  She did increase her losartan back to 50mg daily due to some higher blood pressures that are better controlled now.      No bleeding issues with pradaxa, recently had knee injections for arthritis. Does want to switch back to xarelto because she is having some nuisance bruising.    Wants to come off amiodarone reading more about side affects.    EKG here with sinus bradycardia, normal intervals.    EP Visit 5/2/18:  Since her last visit, she developed recurrent symptomatic AF after her ablation. On 2/6/18 she was admitted for a repeat ablation. Since her second ablation, she has not had any recurrence of her symptoms. She has remained on Amiodarone 100 mg and her ZioPatch shows NO atrial fibrillation. Her LFT's and TFT's are normal as of today.  She has had two mechanical falls over the last 3 weeks, (tripped each time), and has some bruising on her lower extremities in the aftermath of these. No other bleeding complications on Xarelto.         Past Medical History:   Diagnosis Date     Antiplatelet or antithrombotic long-term use      Anxiety      Arrhythmia      Arthritis      Atrial fibrillation (H)      Constipation      Depression      Fibromuscular dysplasia (H)      Gastroesophageal reflux disease      HLD (hyperlipidemia)      HTN (hypertension)      Intraparenchymal renal artery disease (H)      Migraines      Palpitation      PVC's (premature ventricular contractions)      Past Surgical History:   Procedure Laterality Date     AS PTA RENAL/VISCERAL ARTERY Right      C/SECTION,  "CLASSICAL        SECTION       FUSION CERVICAL ANTERIOR ONE LEVEL      c3-C4     H ABLATION FOCAL AFIB       HYSTERECTOMY       ORTHOPEDIC SURGERY      knee arthroscopy bilaterally      TONSILLECTOMY       TRANSESOPHAGEAL ECHOCARDIOGRAM INTRAOPERATIVE N/A 2018    Procedure: TRANSESOPHAGEAL ECHOCARDIOGRAM INTRAOPERATIVE;;  Surgeon: GENERIC ANESTHESIA PROVIDER;  Location: [facility] OR     Family History   Problem Relation Age of Onset     CANCER Mother      Asthma Mother      Heart Murmur Sister      prolapse     Depression Sister      Coronary Artery Disease Father      Social History     Social History     Marital status:      Spouse name: N/A     Number of children: N/A     Years of education: N/A     Occupational History     Not on file.     Social History Main Topics     Smoking status: Never Smoker     Smokeless tobacco: Never Used     Alcohol use Yes      Comment: rare      Drug use: No     Sexual activity: Yes     Other Topics Concern     Not on file     Social History Narrative     No Known Allergies     Examination:  /83 (BP Location: Left arm, Patient Position: Chair, Cuff Size: Adult Large)  Pulse 67  Ht 1.778 m (5' 10\")  Wt 112.4 kg (247 lb 11.2 oz)  SpO2 95%  BMI 35.54 kg/m2  General appearance:  Comfortable-appearing female with unlabored breathing.  Neuro:  Alert & fully oriented.  Eyes:  Anicteric sclerae.  Conjugative gaze.  Neck:  Supple.  JVP is not elevated.  CV:  RRR with normal s1 and s2 and no murmur or rub.  Warm extremities with mild non-pitting edema.  Lungs:  Clear on ascultation.  Abdomen:  Soft.  Not tender or distended.  Skin:  No petechiae/ecchymoses.  Not jaundiced.    Labs, Imaging & Procedures were reviewed.    HESHAM  Interpretation Summary  Left ventricular function, chamber size, wall motion, and wall thickness are  normal.The EF is 55-60%.  The left atrial appendage is normal. It is free of spontaneous echo contrast  and thrombus.    Cardiac MR  1. The LV is " normal in cavity size and wall thickness. The global systolic function is normal. The LVEF is 61%. There are no regional wall motion abnormalities.     2. The RV is normal in cavity size. The global systolic function is normal. The RVEF is 70%.      3. Both atria are normal in size.     4. No intracardiac thrombus is visualized.     5. Late gadolinium enhancement imaging shows no MI, fibrosis or infiltrative disease.      6.  A pulmonary venous MRA was performed.  Four pulmonary veins are noted draining into the left atrium  with measurements as described below.  There is no evidence of accessory or anomalous venous drainage.   There is no evidence of pulmonary venous stenosis.      Impression & Plan:  Ms Hernandez is a 58 yo female with symptomatic, paroxysmal atrial fibrillation despite treatment with amiodarone, intolerant of negative chronotropic agents (lightheadedness), and unable to use class Ic antiarrhythmics because she could not undergo a stress test (RVR) to exclude flecainide toxicity.  She under PVI ablation 8/17/17, had recurrent AFib and underwent repeat PVI on 2/6/18. Since her second ablation she has not had any recurrence of her symptoms and her ZioPatch shows no atrial fibrillation. She has been taking 100 mg Amiodarone and her LFT's as well as TFT's were normal today.      Stroke prophylaxis:  CHADSVasc score is 2 for hypertension and female gender.  Continue Xarelto.      Rate control:  Continue metoprolol XL 50 mg BID.        Rhythm control, indicated because she is symptomatic even when rate controlled:  She under PVI ablation 8/17/17 and repeat PVI on 2/6/18.  Still on amiodarone 100mg daily, her LFT's and TFT's are normal as of today. We will continue Amio 100 mg daily for another 6 months. Will obtain PFT's in July 2018.    Seen and discussed with Dr. Doll  Follow up in 6 months.    Rick Parra MD  Cardiovascular Disease Fellow  Pager: [phone]    EP STAFF NOTE  Patient seen and  examined and management plan personally reviewed with the patient. I agree with the note above by the CV/EP fellow.  Husam Doll MD [facility]  Cardiology - Electrophysiology

## 2018-05-02 NOTE — LETTER
5/2/2018      RE: Barb Hernandez  [address]  [place] [facility] [id number]       Dear Colleague,    Thank you for the opportunity to participate in the care of your patient, Barb Hernandez, at the [facility]. Please see a copy of my visit note below.    CARDIAC ELECTROPHYSIOLOGY PATIENT VISIT    Ms Hernandez is a 56 yo female with symptomatic, paroxysmal atrial fibrillation who underwent successful PVI ablation 8/17/17.  She is here for follow up.    Prior HPI at EP Visit June 2017  HPI:  Ms Hernandez is a 56 yo female with symptomatic, paroxysmal atrial fibrillation referred for consideration of ablation.  She was diagnosed with paroxysmal atrial fibrillation in 2015 in the setting of an acute illness but even after recovering she continued to have paroxysms of atrial fibrillation prompting emergency department visits, especially over the past year.  Her symptoms include palpitations, dyspnea, chest discomfort, and weakness.  She has never undergone electrical cardioversion.  She was evaluated at [facility] where she was started on flecainide 50 mg BID but she developed RVR during her stress test and, because the risk of flecainide toxicity could not be excluded, it was discontinued.  Rate control with metoprolol & diltiazem has been impeded by lightheadedness even to the point of ED visits. She was started on amiodarone 400 mg with the intention of performing ablative peripheral vein isolation but she has continued to have episodic AF.  She says that she once again presented to an emergency department 1.5 weeks ago with AF but converted to sinus rhythm after a few doses of metoprolol.  Then she saw one of our NPs, Narda Benavidez, to transfer her care to the [place] because she and her  have been pleased with her husbands medical care by the gastroenterology group here.  She was in AF in the 80s but had been symptomatic for the 4 days  prior so she was scheduled to undergo DCCV.  When she presented, however, she was in sinus rhythm.      Her medical history is notable for hypertension, hyperlipidemia, migraines, depression & anxiety.    Current cardiovascular medications include Amiodarone 400 mg daily, rivaroxaban, losartan 50 mg daily, metoprolol XL 50 mg daily, HCTZ 25 mg daily, pravastatin 20 mg daily.  She has tolerated rivaroxaban without significant bleeding issues.    EP visit 11/22/17  Patient here post ablation in July.  Still feels that she is having her symptoms of palpitations and fatigue, but they are not nearly as severe.  Her toprol was increased to BID and losartan was decreased to 25mg.  Has not really noticed much change in her symptoms since that change.  She did increase her losartan back to 50mg daily due to some higher blood pressures that are better controlled now.      No bleeding issues with pradaxa, recently had knee injections for arthritis. Does want to switch back to xarelto because she is having some nuisance bruising.    Wants to come off amiodarone reading more about side affects.    EKG here with sinus bradycardia, normal intervals.    EP Visit 5/2/18:  Since her last visit, she developed recurrent symptomatic AF after her ablation. On 2/6/18 she was admitted for a repeat ablation. Since her second ablation, she has not had any recurrence of her symptoms. She has remained on Amiodarone 100 mg and her ZioPatch shows NO atrial fibrillation. Her LFT's and TFT's are normal as of today.  She has had two mechanical falls over the last 3 weeks, (tripped each time), and has some bruising on her lower extremities in the aftermath of these. No other bleeding complications on Xarelto.         Past Medical History:   Diagnosis Date     Antiplatelet or antithrombotic long-term use      Anxiety      Arrhythmia      Arthritis      Atrial fibrillation (H)      Constipation      Depression      Fibromuscular dysplasia (H)       "Gastroesophageal reflux disease      HLD (hyperlipidemia)      HTN (hypertension)      Intraparenchymal renal artery disease (H)      Migraines      Palpitation      PVC's (premature ventricular contractions)      Past Surgical History:   Procedure Laterality Date     AS PTA RENAL/VISCERAL ARTERY Right      C/SECTION, CLASSICAL        SECTION       FUSION CERVICAL ANTERIOR ONE LEVEL      c3-C4     H ABLATION FOCAL AFIB       HYSTERECTOMY       ORTHOPEDIC SURGERY      knee arthroscopy bilaterally      TONSILLECTOMY       TRANSESOPHAGEAL ECHOCARDIOGRAM INTRAOPERATIVE N/A 2018    Procedure: TRANSESOPHAGEAL ECHOCARDIOGRAM INTRAOPERATIVE;;  Surgeon: GENERIC ANESTHESIA PROVIDER;  Location: [facility] OR     Family History   Problem Relation Age of Onset     CANCER Mother      Asthma Mother      Heart Murmur Sister      prolapse     Depression Sister      Coronary Artery Disease Father      Social History     Social History     Marital status:      Spouse name: N/A     Number of children: N/A     Years of education: N/A     Occupational History     Not on file.     Social History Main Topics     Smoking status: Never Smoker     Smokeless tobacco: Never Used     Alcohol use Yes      Comment: rare      Drug use: No     Sexual activity: Yes     Other Topics Concern     Not on file     Social History Narrative     No Known Allergies     Examination:  /83 (BP Location: Left arm, Patient Position: Chair, Cuff Size: Adult Large)  Pulse 67  Ht 1.778 m (5' 10\")  Wt 112.4 kg (247 lb 11.2 oz)  SpO2 95%  BMI 35.54 kg/m2  General appearance:  Comfortable-appearing female with unlabored breathing.  Neuro:  Alert & fully oriented.  Eyes:  Anicteric sclerae.  Conjugative gaze.  Neck:  Supple.  JVP is not elevated.  CV:  RRR with normal s1 and s2 and no murmur or rub.  Warm extremities with mild non-pitting edema.  Lungs:  Clear on ascultation.  Abdomen:  Soft.  Not tender or distended.  Skin:  No " petechiae/ecchymoses.  Not jaundiced.    Labs, Imaging & Procedures were reviewed.    HESHAM  Interpretation Summary  Left ventricular function, chamber size, wall motion, and wall thickness are  normal.The EF is 55-60%.  The left atrial appendage is normal. It is free of spontaneous echo contrast  and thrombus.    Cardiac MR  1. The LV is normal in cavity size and wall thickness. The global systolic function is normal. The LVEF is 61%. There are no regional wall motion abnormalities.     2. The RV is normal in cavity size. The global systolic function is normal. The RVEF is 70%.      3. Both atria are normal in size.     4. No intracardiac thrombus is visualized.     5. Late gadolinium enhancement imaging shows no MI, fibrosis or infiltrative disease.      6.  A pulmonary venous MRA was performed.  Four pulmonary veins are noted draining into the left atrium  with measurements as described below.  There is no evidence of accessory or anomalous venous drainage.   There is no evidence of pulmonary venous stenosis.  Impression & Plan:  Ms Hernandez is a 56 yo female with symptomatic, paroxysmal atrial fibrillation despite treatment with amiodarone, intolerant of negative chronotropic agents (lightheadedness), and unable to use class Ic antiarrhythmics because she could not undergo a stress test (RVR) to exclude flecainide toxicity.  She under PVI ablation 8/17/17, had recurrent AFib and underwent repeat PVI on 2/6/18. Since her second ablation she has not had any recurrence of her symptoms and her ZioPatch shows no atrial fibrillation. She has been taking 100 mg Amiodarone and her LFT's as well as TFT's were normal today.      Stroke prophylaxis:  CHADSVasc score is 2 for hypertension and female gender.  Continue Xarelto.      Rate control:  Continue metoprolol XL 50 mg BID.        Rhythm control, indicated because she is symptomatic even when rate controlled:  She under PVI ablation 8/17/17 and repeat PVI on 2/6/18.   Still on amiodarone 100mg daily, her LFT's and TFT's are normal as of today. We will continue Amio 100 mg daily for another 6 months. Will obtain PFT's in July 2018.    Seen and discussed with Dr. Doll  Follow up in 6 months.    Rick Parra MD  Cardiovascular Disease Fellow  Pager: [phone]    EP STAFF NOTE  Patient seen and examined and management plan personally reviewed with the patient. I agree with the note above by the CV/EP fellow.  Husam Doll MD [facility]  Cardiology - Electrophysiology      Please do not hesitate to contact me if you have any questions/concerns.     Sincerely,     Husam Doll MD

## 2018-05-03 LAB — INTERPRETATION ECG - MUSE: NORMAL

## 2018-05-08 DIAGNOSIS — I10 HTN (HYPERTENSION): Primary | ICD-10-CM

## 2018-05-11 ENCOUNTER — OFFICE VISIT (OUTPATIENT)
Dept: NEPHROLOGY | Facility: CLINIC | Age: 58
End: 2018-05-11
Payer: COMMERCIAL

## 2018-05-11 VITALS
OXYGEN SATURATION: 99 % | HEIGHT: 70 IN | BODY MASS INDEX: 35.37 KG/M2 | HEART RATE: 66 BPM | WEIGHT: 247.1 LBS | SYSTOLIC BLOOD PRESSURE: 118 MMHG | DIASTOLIC BLOOD PRESSURE: 76 MMHG

## 2018-05-11 DIAGNOSIS — I48.0 PAROXYSMAL ATRIAL FIBRILLATION (H): ICD-10-CM

## 2018-05-11 DIAGNOSIS — I10 BENIGN ESSENTIAL HYPERTENSION: ICD-10-CM

## 2018-05-11 DIAGNOSIS — I77.3 FIBROMUSCULAR DYSPLASIA (H): Primary | ICD-10-CM

## 2018-05-11 DIAGNOSIS — I10 HTN (HYPERTENSION): ICD-10-CM

## 2018-05-11 LAB
ALBUMIN SERPL-MCNC: 3.6 G/DL (ref 3.4–5)
ALBUMIN UR-MCNC: NEGATIVE MG/DL
ANION GAP SERPL CALCULATED.3IONS-SCNC: 9 MMOL/L (ref 3–14)
APPEARANCE UR: ABNORMAL
BACTERIA #/AREA URNS HPF: ABNORMAL /HPF
BILIRUB UR QL STRIP: NEGATIVE
BUN SERPL-MCNC: 17 MG/DL (ref 7–30)
CALCIUM SERPL-MCNC: 8.9 MG/DL (ref 8.5–10.1)
CHLORIDE SERPL-SCNC: 106 MMOL/L (ref 94–109)
CO2 SERPL-SCNC: 26 MMOL/L (ref 20–32)
COLOR UR AUTO: YELLOW
CREAT SERPL-MCNC: 0.7 MG/DL (ref 0.52–1.04)
CREAT UR-MCNC: 121 MG/DL
DEPRECATED CALCIDIOL+CALCIFEROL SERPL-MC: 24 UG/L (ref 20–75)
ERYTHROCYTE [DISTWIDTH] IN BLOOD BY AUTOMATED COUNT: 12.6 % (ref 10–15)
FERRITIN SERPL-MCNC: 35 NG/ML (ref 8–252)
GFR SERPL CREATININE-BSD FRML MDRD: 86 ML/MIN/1.7M2
GLUCOSE SERPL-MCNC: 104 MG/DL (ref 70–99)
GLUCOSE UR STRIP-MCNC: NEGATIVE MG/DL
HCT VFR BLD AUTO: 38.3 % (ref 35–47)
HGB BLD-MCNC: 12.5 G/DL (ref 11.7–15.7)
HGB UR QL STRIP: NEGATIVE
IRON SATN MFR SERPL: 15 % (ref 15–46)
IRON SERPL-MCNC: 59 UG/DL (ref 35–180)
KETONES UR STRIP-MCNC: NEGATIVE MG/DL
LEUKOCYTE ESTERASE UR QL STRIP: NEGATIVE
MCH RBC QN AUTO: 30.9 PG (ref 26.5–33)
MCHC RBC AUTO-ENTMCNC: 32.6 G/DL (ref 31.5–36.5)
MCV RBC AUTO: 95 FL (ref 78–100)
MUCOUS THREADS #/AREA URNS LPF: PRESENT /LPF
NITRATE UR QL: NEGATIVE
PH UR STRIP: 6 PH (ref 5–7)
PHOSPHATE SERPL-MCNC: 3.5 MG/DL (ref 2.5–4.5)
PLATELET # BLD AUTO: 229 10E9/L (ref 150–450)
POTASSIUM SERPL-SCNC: 3.6 MMOL/L (ref 3.4–5.3)
PROT UR-MCNC: 0.1 G/L
PROT/CREAT 24H UR: 0.08 G/G CR (ref 0–0.2)
PTH-INTACT SERPL-MCNC: 71 PG/ML (ref 18–80)
RBC # BLD AUTO: 4.04 10E12/L (ref 3.8–5.2)
RBC #/AREA URNS AUTO: <1 /HPF (ref 0–2)
SODIUM SERPL-SCNC: 140 MMOL/L (ref 133–144)
SOURCE: ABNORMAL
SP GR UR STRIP: 1.02 (ref 1–1.03)
SQUAMOUS #/AREA URNS AUTO: 3 /HPF (ref 0–1)
TIBC SERPL-MCNC: 379 UG/DL (ref 240–430)
UROBILINOGEN UR STRIP-MCNC: 0 MG/DL (ref 0–2)
WBC # BLD AUTO: 6.5 10E9/L (ref 4–11)
WBC #/AREA URNS AUTO: 1 /HPF (ref 0–5)

## 2018-05-11 PROCEDURE — 80069 RENAL FUNCTION PANEL: CPT | Performed by: INTERNAL MEDICINE

## 2018-05-11 PROCEDURE — 82306 VITAMIN D 25 HYDROXY: CPT | Performed by: INTERNAL MEDICINE

## 2018-05-11 PROCEDURE — 81001 URINALYSIS AUTO W/SCOPE: CPT | Performed by: INTERNAL MEDICINE

## 2018-05-11 PROCEDURE — 83540 ASSAY OF IRON: CPT | Performed by: INTERNAL MEDICINE

## 2018-05-11 PROCEDURE — 84156 ASSAY OF PROTEIN URINE: CPT | Performed by: INTERNAL MEDICINE

## 2018-05-11 PROCEDURE — 82728 ASSAY OF FERRITIN: CPT | Performed by: INTERNAL MEDICINE

## 2018-05-11 PROCEDURE — 36415 COLL VENOUS BLD VENIPUNCTURE: CPT | Performed by: INTERNAL MEDICINE

## 2018-05-11 PROCEDURE — 83970 ASSAY OF PARATHORMONE: CPT | Performed by: INTERNAL MEDICINE

## 2018-05-11 PROCEDURE — 83550 IRON BINDING TEST: CPT | Performed by: INTERNAL MEDICINE

## 2018-05-11 PROCEDURE — 85027 COMPLETE CBC AUTOMATED: CPT | Performed by: INTERNAL MEDICINE

## 2018-05-11 PROCEDURE — G0463 HOSPITAL OUTPT CLINIC VISIT: HCPCS | Mod: ZF

## 2018-05-11 ASSESSMENT — PAIN SCALES - GENERAL: PAINLEVEL: NO PAIN (0)

## 2018-05-11 NOTE — PATIENT INSTRUCTIONS
Decrease salt in the diet (including restaurant food, added salt from the salt shaker, food from boxes and cans, etc.).     Weight loss and exercise may let you get off one BP medication.     Follow up with primary care regarding blood pressure.      What is sodium? -- Sodium is the main ingredient in table salt. It is also found in lots of foods, and even in water. The body needs a very small amount of sodium to work normally, but most people eat much more sodium than their body needs.  Who should cut down on sodium? -- Nearly everyone eats too much sodium. The average American takes in 3,400 milligrams of sodium each day. Experts say that most people should have no more than 2,300 milligrams a day.  Ask your doctor how much sodium you should have.  Why should I cut down on sodium? -- Reducing the amount of sodium you eat can have lots of health benefits:  ?It can lower your blood pressure, which means it can help reduce your risk of stroke, heart attack, kidney damage, and lots of other health problems.  ?It can reduce the amount of fluid in your body, which means that your heart doesn't have to work as hard to push a lot of fluid around.  ?It can keep the kidneys from having to work too hard. This is especially important in people who have kidney disease.  ?It can reduce swelling in the ankles and belly, which can be uncomfortable and make it hard to move.  ?It can reduce the chances of forming kidney stones.  ?It can help keep your bones strong.  Which foods have the most sodium? -- Processed foods have the most sodium. These foods usually come in cans, boxes, jars, and bags. They tend to have a lot of sodium even if they don't taste salty. In fact, many sweet foods have a lot of sodium in them. The only way to know for sure how much sodium you are getting is to check the label (figure 1).  Here are some examples of foods that often have too much sodium:  ?Canned soups  ?Rice and noodle mixes  ?Sauces,  "dressings, and condiments (such as ketchup and mustard)  ?Pre-made frozen meals (also called \"TV dinners\")  ?Deli meats, hot dogs, and cheeses  ?Smoked, cured, or pickled foods  ?Restaurant meals  What should I do to reduce the amount of sodium in my diet? -- Many people think that avoiding the salt shaker and not adding salt to their food means that they are eating a low-sodium diet. This is not true. Not adding salt at the table or when cooking will help a little. But almost all of the sodium you eat is already in the food you buy at the grocery store or at restaurants (figure 2).  The most important thing you can do to cut down on sodium is to eat less processed food. That means that you should avoid most foods that are sold in cans, boxes, jars, and bags. You should also eat in restaurants less often.  Instead of buying pre-made, processed foods, buy fresh or fresh-frozen fruits and vegetables. (Fresh-frozen foods are foods that are frozen without anything added to them.) Buy meats, fish, chicken, and turkey that are fresh instead of canned or sold at the deli counter. (Meats sold at the deli counter are high in sodium). Then try making meals from scratch at home using these low-sodium ingredients.  If you must buy canned or packaged foods, choose ones that are labeled \"sodium free\" or \"very low sodium\" (table 1). Or choose foods that have less than 400 milligrams of sodium in each serving. The amount of sodium in each serving appears on the nutrition label that is printed on canned or packaged foods (figure 1).  Also, whatever changes you make, make them slowly. Choose one thing to do differently, and do that for a while. If that change sticks, add another change. For instance, if you usually eat green beans from a can, try buying fresh or fresh-frozen green beans and cooking them at home without adding salt. If that works for you, keep doing it. Then choose another thing to change. If it doesn't work, don't " "give up. See if you can cut down on sodium another way. The important thing is to take small steps and to stick with the changes that work for you.  What if I really like to eat out? -- You can still eat in restaurants once in a while. But choose places that offer healthier choices. Fast-food places are almost always a bad idea. As an example, a typical meal of a hamburger and french fries from a popular fast-food chain has about 1,600 milligrams of sodium. That's more sodium than some people should eat in a day!  When choosing what to order:  ?Ask your  if your meal can be made without salt  ?Avoid foods that come with sauces or dips  ?Choose plain grilled meats or fish and steamed vegetables  ?Ask for oil and vinegar for your salad, rather than dressing  What if food just does not taste as good without sodium? -- First of all, give it time. Your taste buds can get used to having less sodium, but you have to give them a chance to adjust. Also try other flavorings, such as herbs and spices, lemon juice, and vinegar.  What about salt substitutes? -- Do not use salt substitutes unless your doctor or nurse approves. Some salt substitutes can be dangerous to your health, especially if you take certain medicines.  Do medicines have sodium? -- Yes, some medicines have sodium. If you are buying medicines you can get without a prescription, look to see how much sodium they have. Avoid products that have \"sodium carbonate\" or \"sodium bicarbonate\" unless your doctor prescribes them. (Sodium bicarbonate is baking soda.)    "

## 2018-05-11 NOTE — MR AVS SNAPSHOT
After Visit Summary   5/11/2018    Barb Hernandez    MRN: [id number]           Patient Information     Date Of Birth          1960        Visit Information        Provider Department      5/11/2018 8:30 AM Prabhjot Mark MD [facility]        Today's Diagnoses     Fibromuscular dysplasia (H)    -  1    Paroxysmal atrial fibrillation (H)        Benign essential hypertension          Care Instructions    Decrease salt in the diet (including restaurant food, added salt from the salt shaker, food from boxes and cans, etc.).     Weight loss and exercise may let you get off one BP medication.     Follow up with primary care regarding blood pressure.      What is sodium? -- Sodium is the main ingredient in table salt. It is also found in lots of foods, and even in water. The body needs a very small amount of sodium to work normally, but most people eat much more sodium than their body needs.  Who should cut down on sodium? -- Nearly everyone eats too much sodium. The average American takes in 3,400 milligrams of sodium each day. Experts say that most people should have no more than 2,300 milligrams a day.  Ask your doctor how much sodium you should have.  Why should I cut down on sodium? -- Reducing the amount of sodium you eat can have lots of health benefits:  ?It can lower your blood pressure, which means it can help reduce your risk of stroke, heart attack, kidney damage, and lots of other health problems.  ?It can reduce the amount of fluid in your body, which means that your heart doesn't have to work as hard to push a lot of fluid around.  ?It can keep the kidneys from having to work too hard. This is especially important in people who have kidney disease.  ?It can reduce swelling in the ankles and belly, which can be uncomfortable and make it hard to move.  ?It can reduce the chances of forming kidney stones.  ?It can help keep your bones strong.  Which foods have the most  "sodium? -- Processed foods have the most sodium. These foods usually come in cans, boxes, jars, and bags. They tend to have a lot of sodium even if they don't taste salty. In fact, many sweet foods have a lot of sodium in them. The only way to know for sure how much sodium you are getting is to check the label (figure 1).  Here are some examples of foods that often have too much sodium:  ?Canned soups  ?Rice and noodle mixes  ?Sauces, dressings, and condiments (such as ketchup and mustard)  ?Pre-made frozen meals (also called \"TV dinners\")  ?Deli meats, hot dogs, and cheeses  ?Smoked, cured, or pickled foods  ?Restaurant meals  What should I do to reduce the amount of sodium in my diet? -- Many people think that avoiding the salt shaker and not adding salt to their food means that they are eating a low-sodium diet. This is not true. Not adding salt at the table or when cooking will help a little. But almost all of the sodium you eat is already in the food you buy at the grocery store or at restaurants (figure 2).  The most important thing you can do to cut down on sodium is to eat less processed food. That means that you should avoid most foods that are sold in cans, boxes, jars, and bags. You should also eat in restaurants less often.  Instead of buying pre-made, processed foods, buy fresh or fresh-frozen fruits and vegetables. (Fresh-frozen foods are foods that are frozen without anything added to them.) Buy meats, fish, chicken, and turkey that are fresh instead of canned or sold at the deli counter. (Meats sold at the deli counter are high in sodium). Then try making meals from scratch at home using these low-sodium ingredients.  If you must buy canned or packaged foods, choose ones that are labeled \"sodium free\" or \"very low sodium\" (table 1). Or choose foods that have less than 400 milligrams of sodium in each serving. The amount of sodium in each serving appears on the nutrition label that is printed on " "canned or packaged foods (figure 1).  Also, whatever changes you make, make them slowly. Choose one thing to do differently, and do that for a while. If that change sticks, add another change. For instance, if you usually eat green beans from a can, try buying fresh or fresh-frozen green beans and cooking them at home without adding salt. If that works for you, keep doing it. Then choose another thing to change. If it doesn't work, don't give up. See if you can cut down on sodium another way. The important thing is to take small steps and to stick with the changes that work for you.  What if I really like to eat out? -- You can still eat in restaurants once in a while. But choose places that offer healthier choices. Fast-food places are almost always a bad idea. As an example, a typical meal of a hamburger and french fries from a popular fast-food chain has about 1,600 milligrams of sodium. That's more sodium than some people should eat in a day!  When choosing what to order:  ?Ask your  if your meal can be made without salt  ?Avoid foods that come with sauces or dips  ?Choose plain grilled meats or fish and steamed vegetables  ?Ask for oil and vinegar for your salad, rather than dressing  What if food just does not taste as good without sodium? -- First of all, give it time. Your taste buds can get used to having less sodium, but you have to give them a chance to adjust. Also try other flavorings, such as herbs and spices, lemon juice, and vinegar.  What about salt substitutes? -- Do not use salt substitutes unless your doctor or nurse approves. Some salt substitutes can be dangerous to your health, especially if you take certain medicines.  Do medicines have sodium? -- Yes, some medicines have sodium. If you are buying medicines you can get without a prescription, look to see how much sodium they have. Avoid products that have \"sodium carbonate\" or \"sodium bicarbonate\" unless your doctor prescribes them. " (Sodium bicarbonate is baking soda.)            Follow-ups after your visit        Your next 10 appointments already scheduled     Jul 09, 2018  7:00 AM CDT   PFT VISIT with  PFL FLORENCE   [facility] Pulmonary Function Testing ([facility])    [address]  3rd Floor  [place] [id number] [phone]            Nov 07, 2018  7:30 AM CST   Lab with  LAB   [facility] Lab ([facility])    [address]  [address] [facility] [id number] [phone]            Nov 07, 2018  8:00 AM CST   (Arrive by 7:45 AM)   RETURN ARRHYTHMIA with Husam Doll MD   [facility] Care ([facility])    [address]  [address]  [place] [address] [phone]              Who to contact     If you have questions or need follow up information about today's clinic visit or your schedule please contact [facility] directly at [phone].  Normal or non-critical lab and imaging results will be communicated to you by [employer], letter or phone within 4 business days after the clinic has received the results. If you do not hear from us within 7 days, please contact the clinic through [employer] or phone. If you have a critical or abnormal lab result, we will notify you by phone as soon as possible.  Submit refill requests through [employer] or call your pharmacy and they will forward the refill request to us. Please allow 3 business days for your refill to be completed.          Additional Information About Your Visit        [employer] Information     [employer] gives you secure access to your electronic health record. If you see a primary care provider, you can also send messages to your care team and make appointments. If you have questions, please call your primary care clinic.  If you do not have a primary care provider, please call [phone] and they will assist you.        Care EveryWhere ID     This is your Care EveryWhere ID.  "This could be used by other organizations to access your [facility] medical records  [id number]        Your Vitals Were     Pulse Height Pulse Oximetry BMI (Body Mass Index)          66 1.778 m (5' 10\") 99% 35.46 kg/m2         Blood Pressure from Last 3 Encounters:   05/11/18 118/76   05/02/18 130/83   02/07/18 116/71    Weight from Last 3 Encounters:   05/11/18 112.1 kg (247 lb 1.6 oz)   05/02/18 112.4 kg (247 lb 11.2 oz)   02/06/18 110.2 kg (242 lb 15.2 oz)              Today, you had the following     No orders found for display       Primary Care Provider Office Phone # Fax #    Mindy Castañeda [phone]       [facility] [address]  [facility] [address]        Equal Access to Services     [facility]: Hadii aad ku hadasho Sodarrin, waaxda luqadaha, qaybta kaalmada adenikyada, chantale velasquez . So [facility] [phone].    ATENCIÓN: Si habla español, tiene a vargas disposición servicios gratuitos de asistencia lingüística. Llame al [phone].    We comply with applicable federal civil rights laws and [place] laws. We do not discriminate on the basis of race, color, national origin, age, disability, sex, sexual orientation, or gender identity.            Thank you!     Thank you for choosing [facility]  for your care. Our goal is always to provide you with excellent care. Hearing back from our patients is one way we can continue to improve our services. Please take a few minutes to complete the written survey that you may receive in the mail after your visit with us. Thank you!             Your Updated Medication List - Protect others around you: Learn how to safely use, store and throw away your medicines at www.disposemymeds.org.          This list is accurate as of 5/11/18 11:59 PM.  Always use your most recent med list.                   Brand Name Dispense Instructions for use Diagnosis    amiodarone 200 MG tablet    " PACERONE/CODARONE    45 tablet    Take 0.5 tablets (100 mg) by mouth daily    Paroxysmal atrial fibrillation (H)       amLODIPine 5 MG tablet    NORVASC    30 tablet    Take 1 tablet (5 mg) by mouth daily    Benign essential hypertension       aspirin 81 MG tablet      Take 81 mg by mouth every morning        carvedilol 25 MG tablet    COREG    180 tablet    Take 1 tablet (25 mg) by mouth 2 times daily (with meals)    PAF (paroxysmal atrial fibrillation) (H)       dabigatran ANTICOAGULANT 150 MG capsule    PRADAXA ANTICOAGULANT    60 capsule    Take 1 capsule (150 mg) by mouth 2 times daily Store in original 's bottle or blister pack; use within 120 days of opening.    Paroxysmal atrial fibrillation (H)       HYDROCHLOROTHIAZIDE PO      Take 25 mg by mouth daily        losartan 25 MG tablet    COZAAR    180 tablet    Take 1 tablet (25 mg) by mouth 2 times daily    Paroxysmal atrial fibrillation (H)       MAGNESIUM OXIDE PO      Take 400 mg by mouth daily        MELATONIN PO      Take by mouth At Bedtime        PRAVASTATIN SODIUM PO      Take 20 mg by mouth every evening        RANITIDINE HCL PO      Take 150 mg by mouth 2 times daily        rivaroxaban ANTICOAGULANT 20 MG Tabs tablet    XARELTO     Take 1 Tab by mouth every evening with a meal.        senna-docusate 8.6-50 MG per tablet    SENOKOT-S;PERICOLACE     Take 2 tablets by mouth every morning        TYLENOL PO      Take 650 mg by mouth every 4 hours as needed for mild pain or fever (for migraines)        venlafaxine 150 MG Tb24 24 hr tablet    EFFEXOR-ER     Take 150 mg by mouth every evening        VITAMIN D (CHOLECALCIFEROL) PO      Take 2,000 Units by mouth daily

## 2018-05-11 NOTE — NURSING NOTE
"Chief Complaint   Patient presents with     Consult     Kidney cyst consult    PATRICK PEDRO CMA    /76  Pulse 66  Ht 1.778 m (5' 10\")  Wt 112.1 kg (247 lb 1.6 oz)  SpO2 99%  BMI 35.46 kg/m2    "

## 2018-05-12 PROBLEM — I77.3 FIBROMUSCULAR DYSPLASIA (H): Status: ACTIVE | Noted: 2018-05-12

## 2018-05-12 PROBLEM — I10 BENIGN ESSENTIAL HYPERTENSION: Status: ACTIVE | Noted: 2018-05-12

## 2018-05-12 NOTE — PROGRESS NOTES
"Nephrology     CC: hypertension and kidney cyst    HPI:  Barb Hernandez is a 58 year old female with pmh of fibromuscular dysplasia s/p 2 renal artery balloon angioplasties, long standing HTN, and recent atrial fibrillation s/p ablation referred to Nephrology by Dr Haney at the patient's request regarding her HTN and kidney cyst.     The patient reports at least two decades of hypertension. About 10-12 years ago she was found to have fibromuscular dysplasia and she reports that she underwent two balloon angioplasties, last about 8 years ago (these are her estimates). She reports that she had already had HTN for years prior to these events. Yet, she reports that her HTN was stable for years until recently, during which time she has undergone management of atrial fibrillation, including ablation in 8/17. She notes worsening HTN requiring more medications, though she also notes that she has been gaining weight due to inactivity, which she says was due to her RVR with atrial fibrillation. She also notes a diet high in salt.     She takes no NSAIDS. No KIRAN that she is aware of, and we only have creatinine values going back to 2017. Those creatinines are 0.7-0.84, eGFR 70-80s.     The patient was concerned, upon learning that she had a simple renal cyst seen on a recent renal ultrasound with doppler done to rule out recurrence of renal artery stenosis (which it did), the patient was concerned that the cyst could the cause of her recently worsened hypertension. She was also concerned that the cyst, described as simple by radiology, could be malignant given her father had \"kidney cancer.\"     No HAs or CP. No SOB. She notes mild LE swelling for about 1 month, having also started amlodipine 5mg daily one month ago.       Past Medical History:   Diagnosis Date     Antiplatelet or antithrombotic long-term use      Anxiety      Arrhythmia      Arthritis      Atrial fibrillation (H)      Constipation      Depression      " Fibromuscular dysplasia (H)      Gastroesophageal reflux disease      HLD (hyperlipidemia)      HTN (hypertension)      Intraparenchymal renal artery disease (H)      Migraines      Palpitation      PVC's (premature ventricular contractions)          Past Surgical History:   Procedure Laterality Date     AS PTA RENAL/VISCERAL ARTERY Right      C/SECTION, CLASSICAL        SECTION       FUSION CERVICAL ANTERIOR ONE LEVEL      c3-C4     H ABLATION FOCAL AFIB       HYSTERECTOMY       ORTHOPEDIC SURGERY      knee arthroscopy bilaterally      TONSILLECTOMY       TRANSESOPHAGEAL ECHOCARDIOGRAM INTRAOPERATIVE N/A 2018    Procedure: TRANSESOPHAGEAL ECHOCARDIOGRAM INTRAOPERATIVE;;  Surgeon: GENERIC ANESTHESIA PROVIDER;  Location: [facility] OR        Family History   Problem Relation Age of Onset     CANCER Mother      Asthma Mother      Heart Murmur Sister      prolapse     Depression Sister      Coronary Artery Disease Father    cousin with CKD awaiting transplant, father with RCC     Social History   Substance Use Topics     Smoking status: Never Smoker     Smokeless tobacco: Never Used     Alcohol use Yes      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       Medications:  Current Outpatient Prescriptions   Medication Sig Dispense Refill     Acetaminophen (TYLENOL PO) Take 650 mg by mouth every 4 hours as needed for mild pain or fever (for migraines)       amiodarone (PACERONE/CODARONE) 200 MG tablet Take 0.5 tablets (100 mg) by mouth daily 45 tablet 1     amLODIPine (NORVASC) 5 MG tablet Take 1 tablet (5 mg) by mouth daily 30 tablet 3     carvedilol (COREG) 25 MG tablet Take 1 tablet (25 mg) by mouth 2 times daily (with meals) 180 tablet 3     HYDROCHLOROTHIAZIDE PO Take 25 mg by mouth daily       losartan (COZAAR) 25 MG tablet Take 1 tablet (25 mg) by mouth 2 times daily 180 tablet 3     MAGNESIUM OXIDE PO Take 400 mg by mouth daily       MELATONIN PO Take by mouth At Bedtime       PRAVASTATIN SODIUM PO Take 20 mg by  "mouth every evening        RANITIDINE HCL PO Take 150 mg by mouth 2 times daily       rivaroxaban ANTICOAGULANT (XARELTO) 20 MG TABS tablet Take 1 Tab by mouth every evening with a meal.       senna-docusate (SENOKOT-S;PERICOLACE) 8.6-50 MG per tablet Take 2 tablets by mouth every morning        venlafaxine (EFFEXOR-ER) 150 MG TB24 24 hr tablet Take 150 mg by mouth every evening        VITAMIN D, CHOLECALCIFEROL, PO Take 2,000 Units by mouth daily       aspirin 81 MG tablet Take 81 mg by mouth every morning       dabigatran ANTICOAGULANT (PRADAXA ANTICOAGULANT) 150 MG capsule Take 1 capsule (150 mg) by mouth 2 times daily Store in original 's bottle or blister pack; use within 120 days of opening. (Patient not taking: Reported on 5/2/2018) 60 capsule 0       Review Of Systems:  Constitutional: Negative  Eyes: negative  Ears/Nose/Throat: negative  Cardiovascular: as above  Respiratory: No shortness of breath, dyspnea on exertion, cough, or hemoptysis  Gastrointestinal: constipation  Genitourinary: urinary rentention when constipated  Musculoskeletal: negative  Skin: negative  Neurologic: negative  Endocrine: negative  Hematologic/Lymphatic/Immunologic: negative  Psychiatric: negative    OBJECTIVE:  Physical exam:  /76  Pulse 66  Ht 1.778 m (5' 10\")  Wt 112.1 kg (247 lb 1.6 oz)  SpO2 99%  BMI 35.46 kg/m2  Body mass index is 35.46 kg/(m^2).   Gen: Well-developed, well-nourished and in no apparent distress  HEENT: normocephalic, anicteric, MMM  CV: regular rate and rhythm, normal S1 S2, no S3 or S4 and no murmur, click, or rub  Resp: clear to ausculation bilaterally, normal respiratory effort  Abd: bowel sounds presents and no abd bruit, soft, non-tender, non-distended, no masses or hepatosplenomegaly  Ext: WWP, trace LE edema bilat   Skin: LEs with dry, scaling skin overlying edema   Psych: normal mood, normal affect  Neuro: alert and oriented x3, CN2-12 grossly intact    Recent Labs   Lab Test  " 05/11/18   0723  02/06/18   0807   NA  140  142   POTASSIUM  3.6  4.2   CHLORIDE  106  105   CO2  26  29   ANIONGAP  9  8   GLC  104*  101*   BUN  17  15   CR  0.70  0.80   TORIN  8.9  8.8     Lab Results   Component Value Date    WBC 6.5 05/11/2018     Lab Results   Component Value Date    RBC 4.04 05/11/2018     Lab Results   Component Value Date    HGB 12.5 05/11/2018     Lab Results   Component Value Date    HCT 38.3 05/11/2018     No components found for: MCT  Lab Results   Component Value Date    MCV 95 05/11/2018     Lab Results   Component Value Date    MCH 30.9 05/11/2018     Lab Results   Component Value Date    MCHC 32.6 05/11/2018     Lab Results   Component Value Date    RDW 12.6 05/11/2018     Lab Results   Component Value Date     05/11/2018     Color Urine (no units)   Date Value   05/11/2018 Yellow     Appearance Urine (no units)   Date Value   05/11/2018 Slightly Cloudy     Glucose Urine (mg/dL)   Date Value   05/11/2018 Negative     Bilirubin Urine (no units)   Date Value   05/11/2018 Negative     Ketones Urine (mg/dL)   Date Value   05/11/2018 Negative     Specific Gravity Urine (no units)   Date Value   05/11/2018 1.017     pH Urine (pH)   Date Value   05/11/2018 6.0     Protein Albumin Urine (mg/dL)   Date Value   05/11/2018 Negative     Nitrite Urine (no units)   Date Value   05/11/2018 Negative     Leukocyte Esterase Urine (no units)   Date Value   05/11/2018 Negative     Urine protein: 0.08g/g-cr      Renal US with doppler 3/30/18:  Impression:  1. No Doppler ultrasound evidence of renal artery stenosis.  2. Asymmetric size of the kidneys, the right being smaller than the left, which may be related to technique. Cortical thickness is normal.  3. Simple appearing cyst in the left kidney.      ASSESSMENT/PLAN:  Pt is a 58 year old female here to establish care.    Barb was seen today for consult.    Diagnoses and all orders for this visit:    Simple kidney cyst  Reassured the patient  about the benign nature of simple renal cysts. Regarding the patient's concern about the cyst's relationship to HTN, compression from a cyst is very unlikely. The much more likely explanation is longstanding HTN worsened with age, high salt intake, and weight gain. Discussed these lifestyle changes with the patient.     Benign essential hypertension  Patient with longstanding HTN, which may be benign essential HTN or the result of previous kidney injury from FMD. Regardless, pt with well controlled HTN on her current medications (losartan 25mg bid, carvedilol 25mg bid, HCTZ 25mg, and amlodipine 5mg). She could go off amlodipine, which is likely the cause of her edema, with up-titration of her losartan, for example.     Fibromuscular dysplasia (H)  No evidence of recurrence on recent US with doppler.     Kidney function  Patient with normal range eGFR, no proteinuria, and no hematuria. No e/o current renal disease.     Paroxysmal atrial fibrillation (H)  Following with EP. On anticoagulation and carvedilol.       Return to clinic as needed. No planned f/u at this time.     Patient seen and discussed with Dr. Haynes who agrees with this plan.    Prabhjot Mark MD  Renal Fellow  Pager [phone]    I have seen and reviewed the patient with the fellow. The fellow's note reflects our joint assessment and plan. -- Adarsh Haynes M.D.

## 2018-06-08 DIAGNOSIS — I48.0 PAROXYSMAL ATRIAL FIBRILLATION (H): ICD-10-CM

## 2018-06-08 RX ORDER — AMIODARONE HYDROCHLORIDE 200 MG/1
100 TABLET ORAL DAILY
Qty: 45 TABLET | Refills: 1 | Status: SHIPPED | OUTPATIENT
Start: 2018-06-08 | End: 2018-11-07

## 2018-06-16 ENCOUNTER — HOSPITAL ENCOUNTER (EMERGENCY)
Facility: CLINIC | Age: 58
Discharge: HOME | End: 2018-06-16
Attending: EMERGENCY MEDICINE | Admitting: EMERGENCY MEDICINE
Payer: COMMERCIAL

## 2018-06-16 ENCOUNTER — APPOINTMENT (OUTPATIENT)
Dept: CT IMAGING | Facility: CLINIC | Age: 58
End: 2018-06-16
Attending: EMERGENCY MEDICINE
Payer: COMMERCIAL

## 2018-06-16 VITALS
HEIGHT: 70 IN | RESPIRATION RATE: 18 BRPM | SYSTOLIC BLOOD PRESSURE: 126 MMHG | HEART RATE: 69 BPM | OXYGEN SATURATION: 98 % | TEMPERATURE: 98 F | DIASTOLIC BLOOD PRESSURE: 65 MMHG | BODY MASS INDEX: 35.24 KG/M2 | WEIGHT: 246.2 LBS

## 2018-06-16 DIAGNOSIS — E87.6 HYPOKALEMIA: ICD-10-CM

## 2018-06-16 DIAGNOSIS — R51.9 HEADACHE AROUND THE EYES: ICD-10-CM

## 2018-06-16 LAB
ANION GAP SERPL CALCULATED.3IONS-SCNC: 7 MMOL/L (ref 3–14)
BASOPHILS # BLD AUTO: 0 10E9/L (ref 0–0.2)
BASOPHILS NFR BLD AUTO: 0.1 %
BUN SERPL-MCNC: 18 MG/DL (ref 7–30)
CALCIUM SERPL-MCNC: 9.2 MG/DL (ref 8.5–10.1)
CHLORIDE SERPL-SCNC: 104 MMOL/L (ref 94–109)
CO2 SERPL-SCNC: 28 MMOL/L (ref 20–32)
CREAT SERPL-MCNC: 0.65 MG/DL (ref 0.52–1.04)
DIFFERENTIAL METHOD BLD: NORMAL
EOSINOPHIL # BLD AUTO: 0 10E9/L (ref 0–0.7)
EOSINOPHIL NFR BLD AUTO: 0.1 %
ERYTHROCYTE [DISTWIDTH] IN BLOOD BY AUTOMATED COUNT: 13.2 % (ref 10–15)
GFR SERPL CREATININE-BSD FRML MDRD: >90 ML/MIN/1.7M2
GLUCOSE SERPL-MCNC: 93 MG/DL (ref 70–99)
HCT VFR BLD AUTO: 37.9 % (ref 35–47)
HGB BLD-MCNC: 12.4 G/DL (ref 11.7–15.7)
IMM GRANULOCYTES # BLD: 0 10E9/L (ref 0–0.4)
IMM GRANULOCYTES NFR BLD: 0.1 %
INR PPP: 1.24 (ref 0.86–1.14)
LYMPHOCYTES # BLD AUTO: 2.2 10E9/L (ref 0.8–5.3)
LYMPHOCYTES NFR BLD AUTO: 32.6 %
MAGNESIUM SERPL-MCNC: 2.3 MG/DL (ref 1.6–2.3)
MCH RBC QN AUTO: 30.7 PG (ref 26.5–33)
MCHC RBC AUTO-ENTMCNC: 32.7 G/DL (ref 31.5–36.5)
MCV RBC AUTO: 94 FL (ref 78–100)
MONOCYTES # BLD AUTO: 0.5 10E9/L (ref 0–1.3)
MONOCYTES NFR BLD AUTO: 6.8 %
NEUTROPHILS # BLD AUTO: 4.1 10E9/L (ref 1.6–8.3)
NEUTROPHILS NFR BLD AUTO: 60.3 %
NRBC # BLD AUTO: 0 10*3/UL
NRBC BLD AUTO-RTO: 0 /100
PLATELET # BLD AUTO: 220 10E9/L (ref 150–450)
POTASSIUM SERPL-SCNC: 3.1 MMOL/L (ref 3.4–5.3)
RBC # BLD AUTO: 4.04 10E12/L (ref 3.8–5.2)
SODIUM SERPL-SCNC: 140 MMOL/L (ref 133–144)
WBC # BLD AUTO: 6.9 10E9/L (ref 4–11)

## 2018-06-16 PROCEDURE — 85610 PROTHROMBIN TIME: CPT | Performed by: EMERGENCY MEDICINE

## 2018-06-16 PROCEDURE — 96365 THER/PROPH/DIAG IV INF INIT: CPT | Performed by: EMERGENCY MEDICINE

## 2018-06-16 PROCEDURE — 96375 TX/PRO/DX INJ NEW DRUG ADDON: CPT | Performed by: EMERGENCY MEDICINE

## 2018-06-16 PROCEDURE — 85025 COMPLETE CBC W/AUTO DIFF WBC: CPT | Performed by: EMERGENCY MEDICINE

## 2018-06-16 PROCEDURE — 83735 ASSAY OF MAGNESIUM: CPT | Performed by: EMERGENCY MEDICINE

## 2018-06-16 PROCEDURE — 70450 CT HEAD/BRAIN W/O DYE: CPT

## 2018-06-16 PROCEDURE — 25000132 ZZH RX MED GY IP 250 OP 250 PS 637: Performed by: EMERGENCY MEDICINE

## 2018-06-16 PROCEDURE — 99284 EMERGENCY DEPT VISIT MOD MDM: CPT | Mod: 25 | Performed by: EMERGENCY MEDICINE

## 2018-06-16 PROCEDURE — 99284 EMERGENCY DEPT VISIT MOD MDM: CPT | Mod: Z6 | Performed by: EMERGENCY MEDICINE

## 2018-06-16 PROCEDURE — 25000128 H RX IP 250 OP 636: Performed by: EMERGENCY MEDICINE

## 2018-06-16 PROCEDURE — 80048 BASIC METABOLIC PNL TOTAL CA: CPT | Performed by: EMERGENCY MEDICINE

## 2018-06-16 RX ORDER — DIPHENHYDRAMINE HCL 50 MG
50 CAPSULE ORAL ONCE
Status: COMPLETED | OUTPATIENT
Start: 2018-06-16 | End: 2018-06-16

## 2018-06-16 RX ORDER — MAGNESIUM SULFATE HEPTAHYDRATE 40 MG/ML
2 INJECTION, SOLUTION INTRAVENOUS ONCE
Status: COMPLETED | OUTPATIENT
Start: 2018-06-16 | End: 2018-06-16

## 2018-06-16 RX ORDER — POTASSIUM CHLORIDE 1500 MG/1
20 TABLET, EXTENDED RELEASE ORAL DAILY
Qty: 20 TABLET | Refills: 0 | Status: SHIPPED | OUTPATIENT
Start: 2018-06-16 | End: 2018-11-07

## 2018-06-16 RX ADMIN — MAGNESIUM SULFATE HEPTAHYDRATE 2 G: 40 INJECTION, SOLUTION INTRAVENOUS at 15:41

## 2018-06-16 RX ADMIN — DIPHENHYDRAMINE HYDROCHLORIDE 50 MG: 50 CAPSULE ORAL at 15:41

## 2018-06-16 RX ADMIN — PROCHLORPERAZINE EDISYLATE 10 MG: 5 INJECTION INTRAMUSCULAR; INTRAVENOUS at 15:41

## 2018-06-16 RX ADMIN — SODIUM CHLORIDE 1000 ML: 9 INJECTION, SOLUTION INTRAVENOUS at 15:17

## 2018-06-16 ASSESSMENT — ENCOUNTER SYMPTOMS
DIZZINESS: 1
HEADACHES: 1
NAUSEA: 0
SHORTNESS OF BREATH: 0
WEAKNESS: 0
CHILLS: 0
LIGHT-HEADEDNESS: 1
SPEECH DIFFICULTY: 0
NECK STIFFNESS: 0
NUMBNESS: 0
ABDOMINAL PAIN: 0
CONFUSION: 0
FEVER: 0
VOMITING: 0

## 2018-06-16 NOTE — DISCHARGE INSTRUCTIONS
Please make an appointment to follow up with Your Primary Care Provider in 7 days.  Your potassium is low today.  Take K-Dur as directed.  You should follow-up with regular doctor for repeat potassium within a week.  Take it easy for the rest of the day.

## 2018-06-16 NOTE — ED AVS SNAPSHOT
[facility], Emergency Department    [address] [address]    Phone:  [phone]                                       Barb Hernandez   MRN: [id number]    Department:  [facility], Emergency Department   Date of Visit:  6/16/2018           Patient Information     Date Of Birth          1960        Your diagnoses for this visit were:     Headache around the eyes     Hypokalemia        You were seen by Jostete Soto MD.        Discharge Instructions       Please make an appointment to follow up with Your Primary Care Provider in 7 days.  Your potassium is low today.  Take K-Dur as directed.  You should follow-up with regular doctor for repeat potassium within a week.  Take it easy for the rest of the day.    Discharge References/Attachments     HEADACHE, UNSPECIFIED (ENGLISH)    HYPOKALEMIA (ENGLISH)      Your next 10 appointments already scheduled     Jul 09, 2018  7:00 AM CDT   PFT VISIT with  PFL A   [facility] Pulmonary Function Testing ([facility])    [address] [place] [address]  3rd Floor  [facility] [id number] [phone]            Nov 07, 2018  7:30 AM CST   Lab with  LAB   [facility] Lab ([facility])    [address] [place] [address]  [address] [facility] [id number] [phone]            Nov 07, 2018  8:00 AM CST   (Arrive by 7:45 AM)   RETURN ARRHYTHMIA with Husam Doll MD   [facility] Heart Care ([facility])    [address] [place] [address]  [address] [place] [id number] [phone]              24 Hour Appointment Hotline       To make an appointment at any [facility], call [phone] ([phone]). If you don't have a family doctor or clinic, we will help you find one. [facility] are conveniently located to serve the needs of you and your family.             Review of your medicines      START taking        Dose / Directions Last dose taken    potassium chloride SA 20 MEQ CR  tablet   Commonly known as:  KLOR-CON   Dose:  20 mEq   Quantity:  20 tablet        Take 1 tablet (20 mEq) by mouth daily   Refills:  0          Our records show that you are taking the medicines listed below. If these are incorrect, please call your family doctor or clinic.        Dose / Directions Last dose taken    amiodarone 200 MG tablet   Commonly known as:  PACERONE/CODARONE   Dose:  100 mg   Quantity:  45 tablet        Take 0.5 tablets (100 mg) by mouth daily   Refills:  1        amLODIPine 5 MG tablet   Commonly known as:  NORVASC   Dose:  5 mg   Quantity:  30 tablet        Take 1 tablet (5 mg) by mouth daily   Refills:  3        aspirin 81 MG tablet   Dose:  81 mg        Take 81 mg by mouth every morning   Refills:  0        carvedilol 25 MG tablet   Commonly known as:  COREG   Dose:  25 mg   Quantity:  180 tablet        Take 1 tablet (25 mg) by mouth 2 times daily (with meals)   Refills:  3        dabigatran ANTICOAGULANT 150 MG capsule   Commonly known as:  PRADAXA ANTICOAGULANT   Dose:  150 mg   Quantity:  60 capsule        Take 1 capsule (150 mg) by mouth 2 times daily Store in original 's bottle or blister pack; use within 120 days of opening.   Refills:  0        HYDROCHLOROTHIAZIDE PO   Dose:  25 mg        Take 25 mg by mouth daily   Refills:  0        losartan 25 MG tablet   Commonly known as:  COZAAR   Dose:  25 mg   Quantity:  180 tablet        Take 1 tablet (25 mg) by mouth 2 times daily   Refills:  3        MAGNESIUM OXIDE PO   Dose:  400 mg        Take 400 mg by mouth daily   Refills:  0        MELATONIN PO        Take by mouth At Bedtime   Refills:  0        PRAVASTATIN SODIUM PO   Dose:  20 mg        Take 20 mg by mouth every evening   Refills:  0        RANITIDINE HCL PO   Dose:  150 mg        Take 150 mg by mouth 2 times daily   Refills:  0        rivaroxaban ANTICOAGULANT 20 MG Tabs tablet   Commonly known as:  XARELTO        Take 1 Tab by mouth every evening with a meal.    Refills:  0        senna-docusate 8.6-50 MG per tablet   Commonly known as:  SENOKOT-S;PERICOLACE   Dose:  2 tablet        Take 2 tablets by mouth every morning   Refills:  0        TYLENOL PO   Dose:  650 mg        Take 650 mg by mouth every 4 hours as needed for mild pain or fever (for migraines)   Refills:  0        venlafaxine 150 MG Tb24 24 hr tablet   Commonly known as:  EFFEXOR-ER   Dose:  150 mg        Take 150 mg by mouth every evening   Refills:  0        VITAMIN D (CHOLECALCIFEROL) PO   Dose:  2000 Units        Take 2,000 Units by mouth daily   Refills:  0                Prescriptions were sent or printed at these locations (1 Prescription)                   Other Prescriptions                Printed at Department/Unit printer (1 of 1)         potassium chloride SA (KLOR-CON) 20 MEQ CR tablet                Procedures and tests performed during your visit     Basic metabolic panel    CBC with platelets differential    CT Head w/o Contrast    INR    Magnesium      Orders Needing Specimen Collection     None      Pending Results     Date and Time Order Name Status Description    6/16/2018 1458 CT Head w/o Contrast Preliminary             Pending Culture Results     No orders found from 6/14/2018 to 6/17/2018.            Pending Results Instructions     If you had any lab results that were not finalized at the time of your Discharge, you can call the ED Lab Result RN at [phone]. You will be contacted by this team for any positive Lab results or changes in treatment. The nurses are available 7 days a week from 10A to 6:30P.  You can leave a message 24 hours per day and they will return your call.        Thank you for choosing [facility]       Thank you for choosing [facility] for your care. Our goal is always to provide you with excellent care. Hearing back from our patients is one way we can continue to improve our services. Please take a few minutes to complete the written survey that you may receive in  the mail after you visit with us. Thank you!        [employer] Information     [employer] gives you secure access to your electronic health record. If you see a primary care provider, you can also send messages to your care team and make appointments. If you have questions, please call your primary care clinic.  If you do not have a primary care provider, please call [phone] and they will assist you.        Care EveryWhere ID     This is your Care EveryWhere ID. This could be used by other organizations to access your [facility] medical records  [id number]        Equal Access to Services     TRICIA VELASCO : Breann Gustafson, ras leggett, phyllis coronado, chantale velasquez . So [facility] [phone].    ATENCIÓN: Si habla español, tiene a vargas disposición servicios gratuitos de asistencia lingüística. Llame al [phone].    We comply with applicable federal civil rights laws and [place] laws. We do not discriminate on the basis of race, color, national origin, age, disability, sex, sexual orientation, or gender identity.            After Visit Summary       This is your record. Keep this with you and show to your community pharmacist(s) and doctor(s) at your next visit.

## 2018-06-16 NOTE — ED AVS SNAPSHOT
[facility] [place], Emergency Department    [address] [place] [id number]    Phone:  [phone]                                       Barb Hernandez   MRN: [id number]    Department:  [facility], Emergency Department   Date of Visit:  6/16/2018           After Visit Summary Signature Page     I have received my discharge instructions, and my questions have been answered. I have discussed any challenges I see with this plan with the nurse or doctor.    ..........................................................................................................................................  Patient/Patient Representative Signature      ..........................................................................................................................................  Patient Representative Print Name and Relationship to Patient    ..................................................               ................................................  Date                                            Time    ..........................................................................................................................................  Reviewed by Signature/Title    ...................................................              ..............................................  Date                                                            Time

## 2018-06-16 NOTE — ED TRIAGE NOTES
"Triage Assessment & Note:    /87  Pulse 68  Temp 98  F (36.7  C) (Oral)  Resp 18  Ht 1.778 m (5' 10\")  Wt 111.7 kg (246 lb 3.2 oz)  SpO2 100%  Breastfeeding? No  BMI 35.33 kg/m2      Patient presents with: Pt comes to triage for headache.  Pt reports that she had to stay home from work on Monday and Tuesday due to headache.  No reports of fever, cough, or SOB    Home Treatments/Remedies: Home medication    Febrile / Afebrile: afebrile    Duration of C/o: week    Mecca Flores RN  June 16, 2018            "

## 2018-06-16 NOTE — ED PROVIDER NOTES
"  History     Chief Complaint   Patient presents with     Headache     HPI  Barb Hernandez is a 58 year old female with a history of paroxysmal atrial fibrillation s/p PVI ablation 8/17/17, on Xarelto, fibromuscular dysplasia status post renal artery balloon angioplasties ×2, hypertension, and hyperlipidemia who presents to the emergency department today for evaluation of headache.  The patient reports that last week, around 7 days ago she developed a headache that has persisted.  She states that the headache was most intense on Sunday of last week (6 days ago), and she actually stayed home on Monday and Tuesday due to this ongoing headache.  She did improve somewhat with waxing and waning intensity throughout the week, and then in the past 24 hours it has again worsened.  She states that the location of the headache has moved around, right now is predominantly behind her right eye/forehead region.  Also within the last couple of days she reports having developed some intermittent dizziness/lightheadedness and nausea.  No loss of consciousness and she also denies any vomiting.  She states that she has been eating and drinking as usual.  She noted this morning while at work when she put on her glasses to read something her headache and dizziness significant worsening.  The patient states that she does have a history of headaches but states that she does not experience a headache to this degree and quite some time.  She states that she did try Tylenol with limited effect; she states she cannot take ibuprofen due to her history of atrial fibrillation and anticoagulation on Xarelto.  The patient denies any abdominal pain.  She has noted some stools which are looser than usual.  No chest pain that she does report a mild, \"allergy kind of\" shortness of breath.  However, patient did note having some pain in the left anterior neck/jaw region last week, though no recurrence of this thereafter.  The patient reports that " "approximately 1 month ago she had a misstep and fell onto her elbows knees; did not hit her head at that time nor did she lose consciousness.  She states that she did not seek medical attention at that time.      Social history: The patient presents to the ER today today with her , \"Adarsh\".  The patient works as a .  She states that she is non-smoker.  Denies recent alcohol use.    Current Facility-Administered Medications   Medication     magnesium sulfate 2 g in water intermittent infusion     Current Outpatient Prescriptions   Medication     Acetaminophen (TYLENOL PO)     amiodarone (PACERONE/CODARONE) 200 MG tablet     amLODIPine (NORVASC) 5 MG tablet     aspirin 81 MG tablet     carvedilol (COREG) 25 MG tablet     HYDROCHLOROTHIAZIDE PO     losartan (COZAAR) 25 MG tablet     MAGNESIUM OXIDE PO     MELATONIN PO     potassium chloride SA (KLOR-CON) 20 MEQ CR tablet     PRAVASTATIN SODIUM PO     RANITIDINE HCL PO     rivaroxaban ANTICOAGULANT (XARELTO) 20 MG TABS tablet     venlafaxine (EFFEXOR-ER) 150 MG TB24 24 hr tablet     VITAMIN D, CHOLECALCIFEROL, PO     dabigatran ANTICOAGULANT (PRADAXA ANTICOAGULANT) 150 MG capsule     senna-docusate (SENOKOT-S;PERICOLACE) 8.6-50 MG per tablet     Past Medical History:   Diagnosis Date     Antiplatelet or antithrombotic long-term use      Anxiety      Arrhythmia      Arthritis      Atrial fibrillation (H)      Constipation      Depression      Fibromuscular dysplasia (H)      Gastroesophageal reflux disease      HLD (hyperlipidemia)      HTN (hypertension)      Intraparenchymal renal artery disease (H)      Migraines      Palpitation      PVC's (premature ventricular contractions)        Past Surgical History:   Procedure Laterality Date     AS PTA RENAL/VISCERAL ARTERY Right      C/SECTION, CLASSICAL        SECTION       FUSION CERVICAL ANTERIOR ONE LEVEL      c3-C4     H ABLATION FOCAL AFIB       HYSTERECTOMY       ORTHOPEDIC SURGERY      " "knee arthroscopy bilaterally      TONSILLECTOMY       TRANSESOPHAGEAL ECHOCARDIOGRAM INTRAOPERATIVE N/A 2/6/2018    Procedure: TRANSESOPHAGEAL ECHOCARDIOGRAM INTRAOPERATIVE;;  Surgeon: GENERIC ANESTHESIA PROVIDER;  Location: [facility]       Family History   Problem Relation Age of Onset     CANCER Mother      Asthma Mother      Heart Murmur Sister      prolapse     Depression Sister      Coronary Artery Disease Father        Social History   Substance Use Topics     Smoking status: Never Smoker     Smokeless tobacco: Never Used     Alcohol use Yes      Comment: rare      No Known Allergies    I have reviewed the Medications, Allergies, Past Medical and Surgical History, and Social History in the Epic system.    Review of Systems   Constitutional: Negative for chills and fever.   HENT: Negative for hearing loss and tinnitus.    Eyes: Negative for visual disturbance.   Respiratory: Negative for shortness of breath.    Cardiovascular: Negative for chest pain.   Gastrointestinal: Negative for abdominal pain, nausea and vomiting.   Musculoskeletal: Negative for neck stiffness.   Neurological: Positive for dizziness, light-headedness and headaches. Negative for syncope, speech difficulty, weakness and numbness.   Psychiatric/Behavioral: Negative for confusion.   All other systems reviewed and are negative.      Physical Exam   BP: 148/87  Pulse: 68  Temp: 98  F (36.7  C)  Resp: 18  Height: 177.8 cm (5' 10\")  Weight: 111.7 kg (246 lb 3.2 oz)  SpO2: 100 %      Physical Exam  Physical Exam   Constitutional:   well nourished, well developed, resting comfortably   HENT:   Head: Normocephalic and atraumatic.   Eyes: Conjunctivae are normal. Pupils are equal, round, and reactive to light. EOMI. Fundi: optic discs are sharp  pharynx has no erythema or exudate, mucous membranes are moist  Neck:   no adenopathy, no bony tenderness  Cardiovascular: regular rate and rhythm without murmurs or gallops  Pulmonary/Chest: Clear to " auscultation bilaterally, with no wheezes or retractions. No respiratory distress.  GI: Soft with good bowel sounds.  Non-tender, non-distended, with no guarding, no rebound, no peritoneal signs.   Back:  No bony or CVA tenderness   Musculoskeletal:  no edema or clubbing   Skin: Skin is warm and dry. No rash noted.   Neurological: alert and oriented to person, place, and time. Nonfocal exam, GCS is 15, CN 2-12 grossly intact, including shoulder shrug, Romberg negative, able to ambulate and tiptoe ambulate, Upper extremity strength intact  Psychiatric:  normal mood and affect.   ED Course     ED Course     Procedures             Critical Care time:  none           Results for orders placed or performed during the hospital encounter of 06/16/18 (from the past 24 hour(s))   CBC with platelets differential   Result Value Ref Range    WBC 6.9 4.0 - 11.0 10e9/L    RBC Count 4.04 3.8 - 5.2 10e12/L    Hemoglobin 12.4 11.7 - 15.7 g/dL    Hematocrit 37.9 35.0 - 47.0 %    MCV 94 78 - 100 fl    MCH 30.7 26.5 - 33.0 pg    MCHC 32.7 31.5 - 36.5 g/dL    RDW 13.2 10.0 - 15.0 %    Platelet Count 220 150 - 450 10e9/L    Diff Method Automated Method     % Neutrophils 60.3 %    % Lymphocytes 32.6 %    % Monocytes 6.8 %    % Eosinophils 0.1 %    % Basophils 0.1 %    % Immature Granulocytes 0.1 %    Nucleated RBCs 0 0 /100    Absolute Neutrophil 4.1 1.6 - 8.3 10e9/L    Absolute Lymphocytes 2.2 0.8 - 5.3 10e9/L    Absolute Monocytes 0.5 0.0 - 1.3 10e9/L    Absolute Eosinophils 0.0 0.0 - 0.7 10e9/L    Absolute Basophils 0.0 0.0 - 0.2 10e9/L    Abs Immature Granulocytes 0.0 0 - 0.4 10e9/L    Absolute Nucleated RBC 0.0    INR   Result Value Ref Range    INR 1.24 (H) 0.86 - 1.14   Basic metabolic panel   Result Value Ref Range    Sodium 140 133 - 144 mmol/L    Potassium 3.1 (L) 3.4 - 5.3 mmol/L    Chloride 104 94 - 109 mmol/L    Carbon Dioxide 28 20 - 32 mmol/L    Anion Gap 7 3 - 14 mmol/L    Glucose 93 70 - 99 mg/dL    Urea Nitrogen 18 7 -  30 mg/dL    Creatinine 0.65 0.52 - 1.04 mg/dL    GFR Estimate >90 >60 mL/min/1.7m2    GFR Estimate If Black >90 >60 mL/min/1.7m2    Calcium 9.2 8.5 - 10.1 mg/dL   Magnesium   Result Value Ref Range    Magnesium 2.3 1.6 - 2.3 mg/dL   CT Head w/o Contrast    Narrative     No acute intracranial pathology.      Labs Ordered and Resulted from Time of ED Arrival Up to the Time of Departure from the ED   INR - Abnormal; Notable for the following:        Result Value    INR 1.24 (*)     All other components within normal limits   BASIC METABOLIC PANEL - Abnormal; Notable for the following:     Potassium 3.1 (*)     All other components within normal limits   CBC WITH PLATELETS DIFFERENTIAL   MAGNESIUM            Assessments & Plan (with Medical Decision Making)       I have reviewed the nursing notes.  Emergency Department course:  The patient was seen and examined at 1441 pm.  I treated the patient with Compazine IV, normal saline IV, magnesium sulfate IV, and Benadryl p.o.  She is unable to take nonsteroidals as she is on Xarelto.  Laboratory studies show an unremarkable CBC, with a WBC of 6.9.  INR is elevated at 1.24.  Basic metabolic panel shows hypokalemia, with potassium of 3.1.  Magnesium is 2.3  Preliminary read on Head CT shows no intracranial pathology.  The patient was feeling quite a bit better on reassessment at approximately 1620 pm.   The differential diagnosis of headache is broad and includes tension headache, migraine headache and cluster headache.   The etiology of her headache is unclear at this time.  I doubt meningitis, encephalitis, intracranial bleed or mass, pseudotumor cerebri or hypertensive emergency.     The patient is a 58-year-old female here with headache behind her right eye and hypokalemia on laboratory studies.  Head CT is unremarkable.  She is afebrile with a normal neurologic examination and feeling better after medications given in the ED..  I believe she is safe to be discharged home.   I recommend she take it easy for the rest of today.  I prescribed K-Dur for hypokalemia.  She will need to follow-up with her regular physician within a week for recheck of potassium.  I counseled the patient in my usual and standard fashion for headache and reasons to return to the Emergency Department.   I have reviewed the findings, diagnosis, plan and need for follow up with the patient.    New Prescriptions    POTASSIUM CHLORIDE SA (KLOR-CON) 20 MEQ CR TABLET    Take 1 tablet (20 mEq) by mouth daily       Final diagnoses:   Headache around the eyes   Hypokalemia     I, Darrius López, am serving as a trained medical scribe to document services personally performed by Josette Soto MD, based on the provider's statements to me.      I, Josette Soto MD, was physically present and have reviewed and verified the accuracy of this note documented by Darrius López.  This note was created in part by the use of Dragon voice recognition dictation system. Inadvertent grammatical errors and typographical errors may still exist.  Josette Soto MD      6/16/2018   [facility], EMERGENCY DEPARTMENT       Josette Soto MD  06/16/18 4116

## 2018-07-09 DIAGNOSIS — Z98.890 S/P ABLATION OF ATRIAL FIBRILLATION: Primary | ICD-10-CM

## 2018-07-09 DIAGNOSIS — Z86.79 S/P ABLATION OF ATRIAL FIBRILLATION: Primary | ICD-10-CM

## 2018-08-08 DIAGNOSIS — I10 BENIGN ESSENTIAL HYPERTENSION: ICD-10-CM

## 2018-08-08 RX ORDER — AMLODIPINE BESYLATE 5 MG/1
5 TABLET ORAL DAILY
Qty: 30 TABLET | Refills: 11 | Status: SHIPPED | OUTPATIENT
Start: 2018-08-08 | End: 2019-08-31

## 2018-08-17 LAB
DLCOUNC-%PRED-PRE: 97 %
DLCOUNC-PRE: 23.8 ML/MIN/MMHG
DLCOUNC-PRED: 24.29 ML/MIN/MMHG
ERV-%PRED-PRE: 12 %
ERV-PRE: 0.08 L
ERV-PRED: 0.64 L
EXPTIME-PRE: 8.55 SEC
FEF2575-%PRED-PRE: 79 %
FEF2575-PRE: 2.19 L/SEC
FEF2575-PRED: 2.74 L/SEC
FEFMAX-%PRED-PRE: 102 %
FEFMAX-PRE: 7.54 L/SEC
FEFMAX-PRED: 7.37 L/SEC
FEV1-%PRED-PRE: 75 %
FEV1-PRE: 2.35 L
FEV1FEV6-PRE: 80 %
FEV1FEV6-PRED: 81 %
FEV1FVC-PRE: 80 %
FEV1FVC-PRED: 79 %
FEV1SVC-PRE: 76 %
FEV1SVC-PRED: 77 %
FIFMAX-PRE: 5.24 L/SEC
FVC-%PRED-PRE: 73 %
FVC-PRE: 2.93 L
FVC-PRED: 3.96 L
IC-%PRED-PRE: 88 %
IC-PRE: 3.01 L
IC-PRED: 3.41 L
VA-%PRED-PRE: 86 %
VA-PRE: 5.17 L
VC-%PRED-PRE: 76 %
VC-PRE: 3.09 L
VC-PRED: 4.05 L

## 2018-09-06 ENCOUNTER — TELEPHONE (OUTPATIENT)
Dept: CARDIOLOGY | Facility: CLINIC | Age: 58
End: 2018-09-06

## 2018-09-06 NOTE — TELEPHONE ENCOUNTER
Prior Authorization Approval    Authorization Effective Date: 8/7/2018  Authorization Expiration Date: 9/6/2019  Medication: Xarelto 20 mg tablet - APPROVED  Approved Dose/Quantity:   Reference #:     Insurance Company: [employer] Phone [phone] Fax [phone]  Expected CoPay:       CoPay Card Available:      Foundation Assistance Needed:    Which Pharmacy is filling the prescription (Not needed for infusion/clinic administered): [facility] [id number] [place] [address] [employer] Benefit [employer] [facility] Prior Authorization Team   Phone: [phone]      PA Initiation    Medication: Xarelto 20 mg tablet -   Insurance Company: [employer] - Phone [phone] Fax [phone]  Pharmacy Filling the Rx: [facility] [id number] [place] [address] [employer]  Filling Pharmacy Phone: [phone]  Filling Pharmacy Fax:    Start Date: 9/6/2018

## 2018-09-06 NOTE — TELEPHONE ENCOUNTER
Prior Authorization Retail Medication Request    Medication/Dose: Xarelto 20 mg tablet  ICD code (if different than what is on RX):    Previously Tried and Failed:    Rationale:      Insurance Name:    Insurance ID:        Pharmacy Information (if different than what is on RX)  Name:  [id number] [employer] [address] [place] [address]  Phone:  [phone]

## 2018-09-19 ENCOUNTER — CARE COORDINATION (OUTPATIENT)
Dept: CARDIOLOGY | Facility: CLINIC | Age: 58
End: 2018-09-19

## 2018-09-19 NOTE — PROGRESS NOTES
Per Ana Bella NP, no bridging needed prior to bilateral knee surgery. Can hold Xarelto 48 hours prior and resume as soon as possible after.     Jaret msg sent to patient.

## 2018-11-05 NOTE — PROGRESS NOTES
CARDIAC ELECTROPHYSIOLOGY PATIENT VISIT    Ms Hernandez is a 57 yo female with symptomatic, paroxysmal atrial fibrillation who underwent successful PVI ablation 8/17/17 and repeat ablation on 2/6/2018.  She is here for follow up.    She was diagnosed with paroxysmal atrial fibrillation in 2015 in the setting of an acute illness but even after recovering she continued to have paroxysms of atrial fibrillation prompting emergency department visits, especially over the past year.  Her symptoms include palpitations, dyspnea, chest discomfort, and weakness.  She has never undergone electrical cardioversion.  She was evaluated at [facility] where she was started on flecainide 50 mg BID but she developed RVR during her stress test and, because the risk of flecainide toxicity could not be excluded, it was discontinued.  Rate control with metoprolol & diltiazem has been impeded by lightheadedness even to the point of ED visits. She was started on amiodarone 400 mg with the intention of performing ablative peripheral vein isolation but she has continued to have episodic AF.  She says that she once again presented to an emergency department 1.5 weeks ago with AF but converted to sinus rhythm after a few doses of metoprolol.  Then she saw one of our NPs, Narda Benavidez, to transfer her care to the [facility] because she and her  have been pleased with her husbands medical care by the gastroenterology group here.  She was in AF in the 80s but had been symptomatic for the 4 days prior so she was scheduled to undergo DCCV.  When she presented, however, she was in sinus rhythm.    EP visit 11/22/17  Patient here post ablation in July.  Still feels that she is having her symptoms of palpitations and fatigue, but they are not nearly as severe.  Her toprol was increased to BID and losartan was decreased to 25mg.  Has not really noticed much change in her symptoms since that change.  She did increase her losartan back to 50mg  daily due to some higher blood pressures that are better controlled now.      No bleeding issues with pradaxa, recently had knee injections for arthritis. Does want to switch back to xarelto because she is having some nuisance bruising.    Wants to come off amiodarone reading more about side affects.    EKG here with sinus bradycardia, normal intervals.    EP Visit 5/2/18:  Since her last visit, she developed recurrent symptomatic AF after her ablation. On 2/6/18 she was admitted for a repeat ablation. Since her second ablation, she has not had any recurrence of her symptoms. She has remained on Amiodarone 100 mg and her ZioPatch shows NO atrial fibrillation. Her LFT's and TFT's are normal as of today.  She has had two mechanical falls over the last 3 weeks, (tripped each time), and has some bruising on her lower extremities in the aftermath of these. No other bleeding complications on Xarelto.     EP visit 11/7/18  She had not experienced any recurrences a month ago. She had knee surgery a month ago and contacted us to discuss stopping the anticoagulation temporarily it went well. After this she got sick with the flu about a week and a half ago and with it she has experienced what she thinks is short episodes of arrhythmia lasting only about a minute the last of which was yesterday.  Otherwise remains on amiodarone. AST, ALT, TSH were normal on 5/2/18. PFT's were done in July showing reduced FEV1 and FVC with normal ratio and normal diffusing capacity suggestive possible mild restriction.She does endorse some SOB with exertion after her surgery. ECG today sows NSR with normal intervals  QRS 82ms.     Current cardiac meds  Losartan 25mg daily  Pravastatin  rivaroxaban 20mg daily  Amiodarone 100mg daily  Amlodipine 5mg daily  Coreg 25mg bid        Past Medical History:   Diagnosis Date     Antiplatelet or antithrombotic long-term use      Anxiety      Arrhythmia      Arthritis      Atrial fibrillation (H)       "Constipation      Depression      Fibromuscular dysplasia (H)      Gastroesophageal reflux disease      HLD (hyperlipidemia)      HTN (hypertension)      Intraparenchymal renal artery disease (H)      Migraines      Palpitation      PVC's (premature ventricular contractions)      Past Surgical History:   Procedure Laterality Date     AS PTA RENAL/VISCERAL ARTERY Right      C/SECTION, CLASSICAL        SECTION       FUSION CERVICAL ANTERIOR ONE LEVEL      c3-C4     H ABLATION FOCAL AFIB       HYSTERECTOMY       ORTHOPEDIC SURGERY      knee arthroscopy bilaterally      TONSILLECTOMY       TRANSESOPHAGEAL ECHOCARDIOGRAM INTRAOPERATIVE N/A 2018    Procedure: TRANSESOPHAGEAL ECHOCARDIOGRAM INTRAOPERATIVE;;  Surgeon: GENERIC ANESTHESIA PROVIDER;  Location: [facility] OR     Family History   Problem Relation Age of Onset     Cancer Mother      Asthma Mother      Heart Murmur Sister      prolapse     Depression Sister      Coronary Artery Disease Father      Social History     Social History     Marital status:      Spouse name: N/A     Number of children: N/A     Years of education: N/A     Occupational History     Not on file.     Social History Main Topics     Smoking status: Never Smoker     Smokeless tobacco: Never Used     Alcohol use Yes      Comment: rare      Drug use: No     Sexual activity: Yes     Other Topics Concern     Not on file     Social History Narrative     No Known Allergies     Examination:  /71 (BP Location: Right arm, Patient Position: Chair, Cuff Size: Adult Large)  Pulse 79  Ht 1.778 m (5' 10\")  Wt 110.1 kg (242 lb 11.2 oz)  SpO2 95%  BMI 34.82 kg/m2  General appearance:  Comfortable-appearing female with no acute distress  Neuro:  Alert & fully oriented.  Eyes:  Anicteric sclerae.  Conjugative gaze.  Neck:  Supple.  JVP is not elevated.  CV:  RRR with normal s1 and s2 and no murmur or rub.  Warm extremities with mild non-pitting edema.  Lungs:  Clear on ascultation.  Abdomen: "  Soft.  Not tender or distended.  Skin:  No petechiae/ecchymoses.  Not jaundiced.    Labs, Imaging & Procedures were reviewed.    7/9/18 PFT  The FEV1 and FVC are reduced but the FEV1/FVC ratio is normal.  The inspiratory flow rates are within normal limits.  The diffusing capacity is normal.  However, the diffusing capacity was not corrected for the patient's hemoglobin.  IMPRESSION:  Possible Mild  Restriction. If clinically indicated consider lung volumes for evaluation for restrictive ventilatory defect.   Normal Diffusion.     HESHAM  Interpretation Summary  Left ventricular function, chamber size, wall motion, and wall thickness are  normal.The EF is 55-60%.  The left atrial appendage is normal. It is free of spontaneous echo contrast  and thrombus.    Cardiac MR  1. The LV is normal in cavity size and wall thickness. The global systolic function is normal. The LVEF is 61%. There are no regional wall motion abnormalities.     2. The RV is normal in cavity size. The global systolic function is normal. The RVEF is 70%.      3. Both atria are normal in size.     4. No intracardiac thrombus is visualized.     5. Late gadolinium enhancement imaging shows no MI, fibrosis or infiltrative disease.      6.  A pulmonary venous MRA was performed.  Four pulmonary veins are noted draining into the left atrium  with measurements as described below.  There is no evidence of accessory or anomalous venous drainage.   There is no evidence of pulmonary venous stenosis.      Impression & Plan:  Ms Hernandez is a 57 yo female with symptomatic, paroxysmal atrial fibrillation despite treatment with amiodarone, intolerant of negative chronotropic agents (lightheadedness), and unable to use class Ic antiarrhythmics because she could not undergo a stress test (RVR) to exclude flecainide toxicity.  She under PVI ablation 8/17/17, had recurrent AFib and underwent repeat PVI on 2/6/18. Since her second ablation she has not had any recurrence  of her symptoms and her ZioPatch shows no atrial fibrillation. She has been taking 100 mg Amiodarone and her LFT's as well as TFT's were normal in May.     Stroke prophylaxis:  CHADSVasc score is 2 for hypertension and female gender.  Continue Xarelto.    Rate control:  Continue metoprolol XL 50 mg BID.      Rhythm control, indicated give she is symptomatic -she had PVI ablation 8/17/17 and repeat PVI on 2/6/18.  Still on amiodarone 100mg daily, her LFT's and TFT's are normal as of today.PFTs with possible mild restriction we will consider stopping amio. For now we will drop the dose in half - make her take amiodarone 100mg every other day and stop both in 6 months. She will need repeat LFT's and TSH in 6 months.     We will have her follow up with Ana Bella in one year.     Patient seen and discussed with dr. Lavon Araiza [facility]  Cardiology fellow, PGY-5    EP STAFF NOTE  Patient seen and examined and management plan personally reviewed with the patient. I agree with the note above by the CV/EP fellow.  Husam Doll MD [facility]  Cardiology - Electrophysiology

## 2018-11-06 ENCOUNTER — PATIENT OUTREACH (OUTPATIENT)
Dept: CARE COORDINATION | Facility: CLINIC | Age: 58
End: 2018-11-06

## 2018-11-07 ENCOUNTER — OFFICE VISIT (OUTPATIENT)
Dept: CARDIOLOGY | Facility: CLINIC | Age: 58
End: 2018-11-07
Attending: INTERNAL MEDICINE
Payer: COMMERCIAL

## 2018-11-07 VITALS
OXYGEN SATURATION: 95 % | BODY MASS INDEX: 34.75 KG/M2 | HEIGHT: 70 IN | WEIGHT: 242.7 LBS | DIASTOLIC BLOOD PRESSURE: 71 MMHG | SYSTOLIC BLOOD PRESSURE: 119 MMHG | HEART RATE: 79 BPM

## 2018-11-07 DIAGNOSIS — I48.0 PAROXYSMAL ATRIAL FIBRILLATION (H): Primary | ICD-10-CM

## 2018-11-07 DIAGNOSIS — Z79.899 ON AMIODARONE THERAPY: ICD-10-CM

## 2018-11-07 LAB
ALBUMIN SERPL-MCNC: 4 G/DL (ref 3.4–5)
ALP SERPL-CCNC: 90 U/L (ref 40–150)
ALT SERPL W P-5'-P-CCNC: 20 U/L (ref 0–50)
ANION GAP SERPL CALCULATED.3IONS-SCNC: 6 MMOL/L (ref 3–14)
AST SERPL W P-5'-P-CCNC: 16 U/L (ref 0–45)
BILIRUB SERPL-MCNC: 0.2 MG/DL (ref 0.2–1.3)
BUN SERPL-MCNC: 19 MG/DL (ref 7–30)
CALCIUM SERPL-MCNC: 9.1 MG/DL (ref 8.5–10.1)
CHLORIDE SERPL-SCNC: 104 MMOL/L (ref 94–109)
CO2 SERPL-SCNC: 29 MMOL/L (ref 20–32)
CREAT SERPL-MCNC: 0.76 MG/DL (ref 0.52–1.04)
GFR SERPL CREATININE-BSD FRML MDRD: 79 ML/MIN/1.7M2
GLUCOSE SERPL-MCNC: 96 MG/DL (ref 70–99)
POTASSIUM SERPL-SCNC: 3.9 MMOL/L (ref 3.4–5.3)
PROT SERPL-MCNC: 7.9 G/DL (ref 6.8–8.8)
SODIUM SERPL-SCNC: 139 MMOL/L (ref 133–144)
TSH SERPL DL<=0.005 MIU/L-ACNC: 1.98 MU/L (ref 0.4–4)

## 2018-11-07 PROCEDURE — 84443 ASSAY THYROID STIM HORMONE: CPT | Performed by: INTERNAL MEDICINE

## 2018-11-07 PROCEDURE — 99213 OFFICE O/P EST LOW 20 MIN: CPT | Mod: GC | Performed by: INTERNAL MEDICINE

## 2018-11-07 PROCEDURE — G0463 HOSPITAL OUTPT CLINIC VISIT: HCPCS | Mod: 25,ZF

## 2018-11-07 PROCEDURE — 93005 ELECTROCARDIOGRAM TRACING: CPT | Mod: ZF

## 2018-11-07 PROCEDURE — 80053 COMPREHEN METABOLIC PANEL: CPT | Performed by: INTERNAL MEDICINE

## 2018-11-07 PROCEDURE — 36415 COLL VENOUS BLD VENIPUNCTURE: CPT | Performed by: INTERNAL MEDICINE

## 2018-11-07 PROCEDURE — 93010 ELECTROCARDIOGRAM REPORT: CPT | Mod: ZP | Performed by: INTERNAL MEDICINE

## 2018-11-07 RX ORDER — OXYCODONE HYDROCHLORIDE 5 MG/1
5-10 TABLET ORAL
COMMUNITY
Start: 2018-10-01 | End: 2021-08-01

## 2018-11-07 RX ORDER — AMIODARONE HYDROCHLORIDE 200 MG/1
100 TABLET ORAL EVERY OTHER DAY
Qty: 25 TABLET | Refills: 1 | Status: SHIPPED | OUTPATIENT
Start: 2018-11-07 | End: 2018-11-07

## 2018-11-07 RX ORDER — HYDROCODONE BITARTRATE AND ACETAMINOPHEN 5; 325 MG/1; MG/1
TABLET ORAL
Refills: 0 | COMMUNITY
Start: 2018-11-02 | End: 2021-08-01

## 2018-11-07 RX ORDER — AMIODARONE HYDROCHLORIDE 200 MG/1
100 TABLET ORAL EVERY OTHER DAY
Qty: 25 TABLET | Refills: 1 | Status: SHIPPED | OUTPATIENT
Start: 2018-11-07 | End: 2019-08-12

## 2018-11-07 ASSESSMENT — PAIN SCALES - GENERAL: PAINLEVEL: NO PAIN (0)

## 2018-11-07 NOTE — MR AVS SNAPSHOT
After Visit Summary   11/7/2018    Barb Hernandez    MRN: [id number]           Patient Information     Date Of Birth          1960        Visit Information        Provider Department      11/7/2018 8:00 AM Husam Doll MD [facility]        Today's Diagnoses     Paroxysmal atrial fibrillation (H)    -  1    On amiodarone therapy          Care Instructions    Cardiology Provider you saw in clinic today: Dr. Doll    Medication Changes:     1. Decrease amiodarone to 100mg every other day = STOP in 6 months    Labs/Tests needed:     1. Labs in 6 months - printed copies given to be drawn locally and faxed     Follow-up Visit:  1 year with Ana Bella NP           Please contact us via [facility] [employer] for questions or concerns.    Rach Montenegro RN   Electrophysiology Nurse Coordinator.  [phone]    If your question concerns the schedule/appointment times, contact:  [facility]   [phone]    Device Clinic (Pacemakers, ICD, Loop Recorders)   [phone]      You will receive all normal lab and testing results via [facility] or mail if not reviewed in clinic today.   If you need a medication refill please contact your pharmacy.    As always, thank you for trusting us with your health care needs!            Follow-ups after your visit        Additional Services     Follow-Up with EP Cardiac Advanced Practice Provider- 1 Year       Ana Bella NP                  Future tests that were ordered for you today     Open Future Orders        Priority Expected Expires Ordered    Follow-Up with EP Cardiac Advanced Practice Provider- 1 Year Routine 11/7/2019 11/8/2019 11/7/2018    Comprehensive metabolic panel Routine 5/6/2019 11/7/2019 11/7/2018    TSH with free T4 reflex Routine 5/6/2019 11/7/2019 11/7/2018            Who to contact     If you have questions or need follow up information about today's clinic visit or your schedule please contact [facility]  "directly at [phone].  Normal or non-critical lab and imaging results will be communicated to you by [employer], letter or phone within 4 business days after the clinic has received the results. If you do not hear from us within 7 days, please contact the clinic through [employer] or phone. If you have a critical or abnormal lab result, we will notify you by phone as soon as possible.  Submit refill requests through [employer] or call your pharmacy and they will forward the refill request to us. Please allow 3 business days for your refill to be completed.          Additional Information About Your Visit        [employer] Information     [employer] gives you secure access to your electronic health record. If you see a primary care provider, you can also send messages to your care team and make appointments. If you have questions, please call your primary care clinic.  If you do not have a primary care provider, please call [phone] and they will assist you.        Care EveryWhere ID     This is your Care EveryWhere ID. This could be used by other organizations to access your [facility] medical records  [id number]        Your Vitals Were     Pulse Height Pulse Oximetry BMI (Body Mass Index)          79 1.778 m (5' 10\") 95% 34.82 kg/m2         Blood Pressure from Last 3 Encounters:   11/07/18 119/71   06/16/18 126/65   05/11/18 118/76    Weight from Last 3 Encounters:   11/07/18 110.1 kg (242 lb 11.2 oz)   06/16/18 111.7 kg (246 lb 3.2 oz)   05/11/18 112.1 kg (247 lb 1.6 oz)              We Performed the Following     EKG 12-lead, tracing only (Same Day)     Follow-Up with Electrophysiologist          Today's Medication Changes          These changes are accurate as of 11/7/18  6:15 PM.  If you have any questions, ask your nurse or doctor.               Start taking these medicines.        Dose/Directions    amiodarone 200 MG tablet   Commonly known as:  PACERONE/CODARONE   Used for:  Paroxysmal atrial fibrillation (H) "   Started by:  Husam Doll MD        Dose:  100 mg   Take 0.5 tablets (100 mg) by mouth every other day   Quantity:  25 tablet   Refills:  1            Where to get your medicines      These medications were sent to [facility] [id number] [place] [facility] - [place], [place] - [address]  [address] [facility] [place] [address]     Phone:  [phone]     amiodarone 200 MG tablet               Information about OPIOIDS     PRESCRIPTION OPIOIDS: WHAT YOU NEED TO KNOW   We gave you an opioid (narcotic) pain medicine. It is important to manage your pain, but opioids are not always the best choice. You should first try all the other options your care team gave you. Take this medicine for as short a time (and as few doses) as possible.    Some activities can increase your pain, such as bandage changes or therapy sessions. It may help to take your pain medicine 30 to 60 minutes before these activities. Reduce your stress by getting enough sleep, working on hobbies you enjoy and practicing relaxation or meditation. Talk to your care team about ways to manage your pain beyond prescription opioids.    These medicines have risks:    DO NOT drive when on new or higher doses of pain medicine. These medicines can affect your alertness and reaction times, and you could be arrested for driving under the influence (DUI). If you need to use opioids long-term, talk to your care team about driving.    DO NOT operate heavy machinery    DO NOT do any other dangerous activities while taking these medicines.    DO NOT drink any alcohol while taking these medicines.     If the opioid prescribed includes acetaminophen, DO NOT take with any other medicines that contain acetaminophen. Read all labels carefully. Look for the word  acetaminophen  or  Tylenol.  Ask your pharmacist if you have questions or are unsure.    You can get addicted to pain medicines, especially if you have a history of addiction (chemical, alcohol or substance dependence). Talk to  your care team about ways to reduce this risk.    All opioids tend to cause constipation. Drink plenty of water and eat foods that have a lot of fiber, such as fruits, vegetables, prune juice, apple juice and high-fiber cereal. Take a laxative (Miralax, milk of magnesia, Colace, Senna) if you don t move your bowels at least every other day. Other side effects include upset stomach, sleepiness, dizziness, throwing up, tolerance (needing more of the medicine to have the same effect), physical dependence and slowed breathing.    Store your pills in a secure place, locked if possible. We will not replace any lost or stolen medicine. If you don t finish your medicine, please throw away (dispose) as directed by your pharmacist. The [place] Pollution [employer] has more information about safe disposal: [email or web address]         Primary Care Provider Office Phone # Fax #    Mindy Castañeda [phone]       [facility] [address]  [facility] [address]        Equal Access to Services     [facility]: Hadii aad ku hadasho Sodarrin, waaxda luqadaha, qaybta kaalmajeremiah coronado, chantale velasquez . So [facility] [phone].    ATENCIÓN: Si habla español, tiene a vargas disposición servicios gratuitos de asistencia lingüística. Ernst al [phone].    We comply with applicable federal civil rights laws and [place] laws. We do not discriminate on the basis of race, color, national origin, age, disability, sex, sexual orientation, or gender identity.            Thank you!     Thank you for choosing [facility]  for your care. Our goal is always to provide you with excellent care. Hearing back from our patients is one way we can continue to improve our services. Please take a few minutes to complete the written survey that you may receive in the mail after your visit with us. Thank you!             Your  Updated Medication List - Protect others around you: Learn how to safely use, store and throw away your medicines at www.disposemymeds.org.          This list is accurate as of 11/7/18  6:15 PM.  Always use your most recent med list.                   Brand Name Dispense Instructions for use Diagnosis    amiodarone 200 MG tablet    PACERONE/CODARONE    25 tablet    Take 0.5 tablets (100 mg) by mouth every other day    Paroxysmal atrial fibrillation (H)       amLODIPine 5 MG tablet    NORVASC    30 tablet    Take 1 tablet (5 mg) by mouth daily    Benign essential hypertension       aspirin 81 MG tablet      Take 81 mg by mouth every morning        carvedilol 25 MG tablet    COREG    180 tablet    Take 1 tablet (25 mg) by mouth 2 times daily (with meals)    PAF (paroxysmal atrial fibrillation) (H)       HYDROCHLOROTHIAZIDE PO      Take 25 mg by mouth daily        HYDROcodone-acetaminophen 5-325 MG per tablet    NORCO     TAKE 1 TABLET BY MOUTH EVERY 6 HOURS AS NEEDED FOR PAIN        losartan 25 MG tablet    COZAAR    180 tablet    Take 1 tablet (25 mg) by mouth 2 times daily    Paroxysmal atrial fibrillation (H)       MAGNESIUM OXIDE PO      Take 400 mg by mouth 2 times daily        MELATONIN PO      Take by mouth At Bedtime        oxyCODONE IR 5 MG tablet    ROXICODONE     Take 5-10 mg by mouth        PRAVASTATIN SODIUM PO      Take 20 mg by mouth every evening        RANITIDINE HCL PO      Take 150 mg by mouth 2 times daily        rivaroxaban ANTICOAGULANT 20 MG Tabs tablet    XARELTO     Take 1 Tab by mouth every evening with a meal.        senna-docusate 8.6-50 MG per tablet    SENOKOT-S;PERICOLACE     Take 2 tablets by mouth every morning        TYLENOL PO      Take 650 mg by mouth every 4 hours as needed for mild pain or fever (for migraines)        venlafaxine 150 MG Tb24 24 hr tablet    EFFEXOR-ER     Take 150 mg by mouth every evening        VITAMIN D (CHOLECALCIFEROL) PO      Take 2,000 Units by mouth daily

## 2018-11-07 NOTE — PATIENT INSTRUCTIONS
Cardiology Provider you saw in clinic today: Dr. Doll    Medication Changes:     1. Decrease amiodarone to 100mg every other day = STOP in 6 months    Labs/Tests needed:     1. Labs in 6 months - printed copies given to be drawn locally and faxed     Follow-up Visit:  1 year with Ana Bella NP           Please contact us via [employer] for questions or concerns.    Rach Montenegro RN   Electrophysiology Nurse Coordinator.  [phone]    If your question concerns the schedule/appointment times, contact:  [facility]   [phone]    Device Clinic (Pacemakers, ICD, Loop Recorders)   [phone]      You will receive all normal lab and testing results via [employer] or mail if not reviewed in clinic today.   If you need a medication refill please contact your pharmacy.    As always, thank you for trusting us with your health care needs!

## 2018-11-07 NOTE — LETTER
11/7/2018      RE: Barb Hernandez  [address]  [facility] [address]       Dear Colleague,    Thank you for the opportunity to participate in the care of your patient, Barb Hernandez, at the [facility]. Please see a copy of my visit note below.    CARDIAC ELECTROPHYSIOLOGY PATIENT VISIT    Ms Hernandez is a 57 yo female with symptomatic, paroxysmal atrial fibrillation who underwent successful PVI ablation 8/17/17 and repeat ablation on 2/6/2018.  She is here for follow up.    She was diagnosed with paroxysmal atrial fibrillation in 2015 in the setting of an acute illness but even after recovering she continued to have paroxysms of atrial fibrillation prompting emergency department visits, especially over the past year.  Her symptoms include palpitations, dyspnea, chest discomfort, and weakness.  She has never undergone electrical cardioversion.  She was evaluated at [facility] where she was started on flecainide 50 mg BID but she developed RVR during her stress test and, because the risk of flecainide toxicity could not be excluded, it was discontinued.  Rate control with metoprolol & diltiazem has been impeded by lightheadedness even to the point of ED visits. She was started on amiodarone 400 mg with the intention of performing ablative peripheral vein isolation but she has continued to have episodic AF.  She says that she once again presented to an emergency department 1.5 weeks ago with AF but converted to sinus rhythm after a few doses of metoprolol.  Then she saw one of our NPs, Narda Benavidez, to transfer her care to the [facility] because she and her  have been pleased with her husbands medical care by the gastroenterology group here.  She was in AF in the 80s but had been symptomatic for the 4 days prior so she was scheduled to undergo DCCV.  When she presented, however, she was in sinus rhythm.    EP visit  11/22/17  Patient here post ablation in July.  Still feels that she is having her symptoms of palpitations and fatigue, but they are not nearly as severe.  Her toprol was increased to BID and losartan was decreased to 25mg.  Has not really noticed much change in her symptoms since that change.  She did increase her losartan back to 50mg daily due to some higher blood pressures that are better controlled now.      No bleeding issues with pradaxa, recently had knee injections for arthritis. Does want to switch back to xarelto because she is having some nuisance bruising.    Wants to come off amiodarone reading more about side affects.    EKG here with sinus bradycardia, normal intervals.    EP Visit 5/2/18:  Since her last visit, she developed recurrent symptomatic AF after her ablation. On 2/6/18 she was admitted for a repeat ablation. Since her second ablation, she has not had any recurrence of her symptoms. She has remained on Amiodarone 100 mg and her ZioPatch shows NO atrial fibrillation. Her LFT's and TFT's are normal as of today.  She has had two mechanical falls over the last 3 weeks, (tripped each time), and has some bruising on her lower extremities in the aftermath of these. No other bleeding complications on Xarelto.     EP visit 11/7/18  She had not experienced any recurrences a month ago. She had knee surgery a month ago and contacted us to discuss stopping the anticoagulation temporarily it went well. After this she got sick with the flu about a week and a half ago and with it she has experienced what she thinks is short episodes of arrhythmia lasting only about a minute the last of which was yesterday.  Otherwise remains on amiodarone. AST, ALT, TSH were normal on 5/2/18. PFT's were done in July showing reduced FEV1 and FVC with normal ratio and normal diffusing capacity suggestive possible mild restriction.She does endorse some SOB with exertion after her surgery. ECG today sows NSR with normal  "intervals  QRS 82ms.     Current cardiac meds  Losartan 25mg daily  Pravastatin  rivaroxaban 20mg daily  Amiodarone 100mg daily  Amlodipine 5mg daily  Coreg 25mg bid        Past Medical History:   Diagnosis Date     Antiplatelet or antithrombotic long-term use      Anxiety      Arrhythmia      Arthritis      Atrial fibrillation (H)      Constipation      Depression      Fibromuscular dysplasia (H)      Gastroesophageal reflux disease      HLD (hyperlipidemia)      HTN (hypertension)      Intraparenchymal renal artery disease (H)      Migraines      Palpitation      PVC's (premature ventricular contractions)      Past Surgical History:   Procedure Laterality Date     AS PTA RENAL/VISCERAL ARTERY Right      C/SECTION, CLASSICAL        SECTION       FUSION CERVICAL ANTERIOR ONE LEVEL      c3-C4     H ABLATION FOCAL AFIB       HYSTERECTOMY       ORTHOPEDIC SURGERY      knee arthroscopy bilaterally      TONSILLECTOMY       TRANSESOPHAGEAL ECHOCARDIOGRAM INTRAOPERATIVE N/A 2018    Procedure: TRANSESOPHAGEAL ECHOCARDIOGRAM INTRAOPERATIVE;;  Surgeon: GENERIC ANESTHESIA PROVIDER;  Location: [facility] OR     Family History   Problem Relation Age of Onset     Cancer Mother      Asthma Mother      Heart Murmur Sister      prolapse     Depression Sister      Coronary Artery Disease Father      Social History     Social History     Marital status:      Spouse name: N/A     Number of children: N/A     Years of education: N/A     Occupational History     Not on file.     Social History Main Topics     Smoking status: Never Smoker     Smokeless tobacco: Never Used     Alcohol use Yes      Comment: rare      Drug use: No     Sexual activity: Yes     Other Topics Concern     Not on file     Social History Narrative     No Known Allergies     Examination:  /71 (BP Location: Right arm, Patient Position: Chair, Cuff Size: Adult Large)  Pulse 79  Ht 1.778 m (5' 10\")  Wt 110.1 kg (242 lb 11.2 oz)  SpO2 95%  BMI 34.82 " kg/m2  General appearance:  Comfortable-appearing female with no acute distress  Neuro:  Alert & fully oriented.  Eyes:  Anicteric sclerae.  Conjugative gaze.  Neck:  Supple.  JVP is not elevated.  CV:  RRR with normal s1 and s2 and no murmur or rub.  Warm extremities with mild non-pitting edema.  Lungs:  Clear on ascultation.  Abdomen:  Soft.  Not tender or distended.  Skin:  No petechiae/ecchymoses.  Not jaundiced.    Labs, Imaging & Procedures were reviewed.    7/9/18 PFT  The FEV1 and FVC are reduced but the FEV1/FVC ratio is normal.  The inspiratory flow rates are within normal limits.  The diffusing capacity is normal.  However, the diffusing capacity was not corrected for the patient's hemoglobin.  IMPRESSION:  Possible Mild  Restriction. If clinically indicated consider lung volumes for evaluation for restrictive ventilatory defect.   Normal Diffusion.     HESHAM  Interpretation Summary  Left ventricular function, chamber size, wall motion, and wall thickness are  normal.The EF is 55-60%.  The left atrial appendage is normal. It is free of spontaneous echo contrast  and thrombus.    Cardiac MR  1. The LV is normal in cavity size and wall thickness. The global systolic function is normal. The LVEF is 61%. There are no regional wall motion abnormalities.     2. The RV is normal in cavity size. The global systolic function is normal. The RVEF is 70%.      3. Both atria are normal in size.     4. No intracardiac thrombus is visualized.     5. Late gadolinium enhancement imaging shows no MI, fibrosis or infiltrative disease.      6.  A pulmonary venous MRA was performed.  Four pulmonary veins are noted draining into the left atrium  with measurements as described below.  There is no evidence of accessory or anomalous venous drainage.   There is no evidence of pulmonary venous stenosis.      Impression & Plan:  Ms Hernandez is a 57 yo female with symptomatic, paroxysmal atrial fibrillation despite treatment with  amiodarone, intolerant of negative chronotropic agents (lightheadedness), and unable to use class Ic antiarrhythmics because she could not undergo a stress test (RVR) to exclude flecainide toxicity.  She under PVI ablation 8/17/17, had recurrent AFib and underwent repeat PVI on 2/6/18. Since her second ablation she has not had any recurrence of her symptoms and her ZioPatch shows no atrial fibrillation. She has been taking 100 mg Amiodarone and her LFT's as well as TFT's were normal in May.     Stroke prophylaxis:  CHADSVasc score is 2 for hypertension and female gender.  Continue Xarelto.    Rate control:  Continue metoprolol XL 50 mg BID.      Rhythm control, indicated give she is symptomatic -she had PVI ablation 8/17/17 and repeat PVI on 2/6/18.  Still on amiodarone 100mg daily, her LFT's and TFT's are normal as of today.PFTs with possible mild restriction we will consider stopping amio. For now we will drop the dose in half - make her take amiodarone 100mg every other day and stop both in 6 months. She will need repeat LFT's and TSH in 6 months.     We will have her follow up with Ana Bella in one year.     Patient seen and discussed with dr. Lavon Araiza [facility]  Cardiology fellow, PGY-5    EP STAFF NOTE  Patient seen and examined and management plan personally reviewed with the patient. I agree with the note above by the CV/EP fellow.      Husam Doll MD [facility]  Cardiology - Electrophysiology

## 2018-11-09 LAB — INTERPRETATION ECG - MUSE: NORMAL

## 2018-11-18 DIAGNOSIS — I48.0 PAROXYSMAL ATRIAL FIBRILLATION (H): ICD-10-CM

## 2018-11-19 RX ORDER — LOSARTAN POTASSIUM 25 MG/1
TABLET ORAL
Qty: 180 TABLET | Refills: 3 | Status: SHIPPED | OUTPATIENT
Start: 2018-11-19 | End: 2019-11-11

## 2018-12-05 DIAGNOSIS — I48.0 PAROXYSMAL ATRIAL FIBRILLATION (H): ICD-10-CM

## 2018-12-05 RX ORDER — AMIODARONE HYDROCHLORIDE 200 MG/1
TABLET ORAL
Qty: 45 TABLET | Refills: 1 | OUTPATIENT
Start: 2018-12-05

## 2018-12-05 NOTE — TELEPHONE ENCOUNTER
Clarified with pharmacy: they have Rx for amiodarone 100mg every other day and will fill for patient.

## 2018-12-06 DIAGNOSIS — I48.0 PAROXYSMAL ATRIAL FIBRILLATION (H): ICD-10-CM

## 2018-12-06 RX ORDER — RIVAROXABAN 20 MG/1
TABLET, FILM COATED ORAL
Qty: 90 TABLET | Refills: 2 | Status: SHIPPED | OUTPATIENT
Start: 2018-12-06 | End: 2019-08-26

## 2019-04-29 DIAGNOSIS — I48.0 PAROXYSMAL ATRIAL FIBRILLATION (H): Primary | ICD-10-CM

## 2019-04-29 LAB
ALBUMIN SERPL-MCNC: 3.9 G/DL
ALP SERPL-CCNC: 84 U/L
ALT SERPL-CCNC: 18 U/L
ANION GAP SERPL CALCULATED.3IONS-SCNC: 8 MMOL/L
AST SERPL-CCNC: 19 U/L
BILIRUB SERPL-MCNC: 0.3 MG/DL
BUN SERPL-MCNC: 14 MG/DL
CALCIUM SERPL-MCNC: 9.8 MG/DL
CHLORIDE SERPLBLD-SCNC: 102 MMOL/L
CO2 SERPL-SCNC: 30 MMOL/L
CREAT SERPL-MCNC: 0.77 MG/DL
GFR SERPL CREATININE-BSD FRML MDRD: >60 ML/MIN/1.73M2
GLUCOSE SERPL-MCNC: 95 MG/DL (ref 70–99)
POTASSIUM SERPL-SCNC: 3.3 MMOL/L
PROT SERPL-MCNC: 7.3 G/DL
SODIUM SERPL-SCNC: 140 MMOL/L
TSH SERPL-ACNC: 1.21 MCU/ML

## 2019-05-06 NOTE — PROGRESS NOTES
Date: 5/6/2019    Time of Call: 1:26 PM     Diagnosis:  Hypokalemia      [ TORB ] Ordering provider: Ana Bella NP   Order:   Notes recorded by Ana Bella APRN CNP on 4/30/2019 at 4:16 PM CDT  Low potassium, may need to consider potassium supplements. Please repeat in 1 mo.     Order received by: Rach Montenegro RN      Follow-up/additional notes:    MyChart msg sent. EP  will fax BMP order to local clinic.

## 2019-08-09 LAB
ANION GAP SERPL CALCULATED.3IONS-SCNC: 10 MMOL/L
BUN SERPL-MCNC: 10 MG/DL
CALCIUM SERPL-MCNC: 9.3 MG/DL
CHLORIDE SERPLBLD-SCNC: 107 MMOL/L
CO2 SERPL-SCNC: 24 MMOL/L
CREAT SERPL-MCNC: 0.67 MG/DL
GFR SERPL CREATININE-BSD FRML MDRD: >60 ML/MIN/1.73M2
GLUCOSE SERPL-MCNC: 117 MG/DL (ref 70–99)
POTASSIUM SERPL-SCNC: 3.4 MMOL/L
SODIUM SERPL-SCNC: 141 MMOL/L

## 2019-08-12 ENCOUNTER — PATIENT OUTREACH (OUTPATIENT)
Dept: CARDIOLOGY | Facility: CLINIC | Age: 59
End: 2019-08-12

## 2019-08-12 DIAGNOSIS — I48.0 PAROXYSMAL ATRIAL FIBRILLATION (H): Primary | ICD-10-CM

## 2019-08-12 DIAGNOSIS — E87.6 HYPOKALEMIA: ICD-10-CM

## 2019-08-12 RX ORDER — POTASSIUM CHLORIDE 1500 MG/1
20 TABLET, EXTENDED RELEASE ORAL DAILY
Qty: 30 TABLET | Refills: 5 | Status: SHIPPED | OUTPATIENT
Start: 2019-08-12 | End: 2019-12-18

## 2019-08-12 NOTE — PROGRESS NOTES
Date: 8/12/2019    Time of Call: 9:52 AM     Diagnosis:  Hypokalemia, AF     [ TORB ] Ordering provider: Ana Bella NP   Order:   Some hypokalemia, would advise potassium supplement 20 meq daily. Repeat labs in 1 month.      Order received by: Rach Montenegro RN      Follow-up/additional notes:   Called and spoke to patient. Reviewed lab work and recommendation. She is curious if her hydrochlorothiazide is decreasing her potassium level. She has been on hydrochlorothiazide for 10+ years r/t FMD. She has not always had hypokalemia, moreso in the recent past.     She said her BPs were low, so her PCP told her to take losartan 25mg daily instead of BID. Patient then felt her HRs increase slightly and has noticed more SOB this summer requiring her to use her inhaler more often. She states her heart rates are 70-80s, where baseline is 60s. She does not feel as though she is in AF. She returned back to losartan 25mg BID. She will discuss these concerns with Dr. Doll at her appointment in September. Labs added prior.

## 2019-08-26 DIAGNOSIS — I48.0 PAROXYSMAL ATRIAL FIBRILLATION (H): ICD-10-CM

## 2019-08-31 DIAGNOSIS — I10 BENIGN ESSENTIAL HYPERTENSION: ICD-10-CM

## 2019-09-04 RX ORDER — AMLODIPINE BESYLATE 5 MG/1
TABLET ORAL
Qty: 90 TABLET | Refills: 0 | Status: SHIPPED | OUTPATIENT
Start: 2019-09-04 | End: 2019-12-07

## 2019-09-17 DIAGNOSIS — Z79.01 ON CONTINUOUS ORAL ANTICOAGULATION: Primary | ICD-10-CM

## 2019-09-18 ENCOUNTER — OFFICE VISIT (OUTPATIENT)
Dept: CARDIOLOGY | Facility: CLINIC | Age: 59
End: 2019-09-18
Attending: INTERNAL MEDICINE
Payer: COMMERCIAL

## 2019-09-18 VITALS
SYSTOLIC BLOOD PRESSURE: 130 MMHG | HEIGHT: 70 IN | DIASTOLIC BLOOD PRESSURE: 83 MMHG | BODY MASS INDEX: 34.97 KG/M2 | OXYGEN SATURATION: 100 % | HEART RATE: 70 BPM | WEIGHT: 244.3 LBS

## 2019-09-18 DIAGNOSIS — E87.6 HYPOKALEMIA: ICD-10-CM

## 2019-09-18 DIAGNOSIS — I48.0 PAROXYSMAL ATRIAL FIBRILLATION (H): ICD-10-CM

## 2019-09-18 DIAGNOSIS — Z79.01 ON CONTINUOUS ORAL ANTICOAGULATION: ICD-10-CM

## 2019-09-18 DIAGNOSIS — Z79.899 ON AMIODARONE THERAPY: ICD-10-CM

## 2019-09-18 LAB
ALBUMIN SERPL-MCNC: 3.7 G/DL (ref 3.4–5)
ALP SERPL-CCNC: 92 U/L (ref 40–150)
ALT SERPL W P-5'-P-CCNC: 23 U/L (ref 0–50)
ANION GAP SERPL CALCULATED.3IONS-SCNC: 2 MMOL/L (ref 3–14)
AST SERPL W P-5'-P-CCNC: 16 U/L (ref 0–45)
BILIRUB SERPL-MCNC: 0.4 MG/DL (ref 0.2–1.3)
BUN SERPL-MCNC: 12 MG/DL (ref 7–30)
CALCIUM SERPL-MCNC: 9.1 MG/DL (ref 8.5–10.1)
CHLORIDE SERPL-SCNC: 107 MMOL/L (ref 94–109)
CO2 SERPL-SCNC: 30 MMOL/L (ref 20–32)
CREAT SERPL-MCNC: 0.6 MG/DL (ref 0.52–1.04)
ERYTHROCYTE [DISTWIDTH] IN BLOOD BY AUTOMATED COUNT: 13.2 % (ref 10–15)
GFR SERPL CREATININE-BSD FRML MDRD: >90 ML/MIN/{1.73_M2}
GLUCOSE SERPL-MCNC: 90 MG/DL (ref 70–99)
HCT VFR BLD AUTO: 40.7 % (ref 35–47)
HGB BLD-MCNC: 13 G/DL (ref 11.7–15.7)
MCH RBC QN AUTO: 30.9 PG (ref 26.5–33)
MCHC RBC AUTO-ENTMCNC: 31.9 G/DL (ref 31.5–36.5)
MCV RBC AUTO: 97 FL (ref 78–100)
PLATELET # BLD AUTO: 216 10E9/L (ref 150–450)
POTASSIUM SERPL-SCNC: 4.2 MMOL/L (ref 3.4–5.3)
PROT SERPL-MCNC: 7.3 G/DL (ref 6.8–8.8)
RBC # BLD AUTO: 4.21 10E12/L (ref 3.8–5.2)
SODIUM SERPL-SCNC: 139 MMOL/L (ref 133–144)
WBC # BLD AUTO: 6 10E9/L (ref 4–11)

## 2019-09-18 PROCEDURE — 93005 ELECTROCARDIOGRAM TRACING: CPT | Mod: ZF

## 2019-09-18 PROCEDURE — 36415 COLL VENOUS BLD VENIPUNCTURE: CPT | Performed by: INTERNAL MEDICINE

## 2019-09-18 PROCEDURE — 93010 ELECTROCARDIOGRAM REPORT: CPT | Mod: ZP | Performed by: INTERNAL MEDICINE

## 2019-09-18 PROCEDURE — 0296T ZIO PATCH HOLTER ADULT PEDIATRIC GREATER THAN 48 HRS: CPT | Mod: ZF

## 2019-09-18 PROCEDURE — 0298T ZZC EXT ECG > 48HR TO 21 DAY REVIEW AND INTERPRETATN: CPT | Performed by: INTERNAL MEDICINE

## 2019-09-18 PROCEDURE — 99214 OFFICE O/P EST MOD 30 MIN: CPT | Mod: 25 | Performed by: INTERNAL MEDICINE

## 2019-09-18 PROCEDURE — 80053 COMPREHEN METABOLIC PANEL: CPT | Performed by: INTERNAL MEDICINE

## 2019-09-18 PROCEDURE — 85027 COMPLETE CBC AUTOMATED: CPT | Performed by: INTERNAL MEDICINE

## 2019-09-18 PROCEDURE — G0463 HOSPITAL OUTPT CLINIC VISIT: HCPCS | Mod: 25,ZF

## 2019-09-18 ASSESSMENT — PAIN SCALES - GENERAL: PAINLEVEL: NO PAIN (0)

## 2019-09-18 ASSESSMENT — MIFFLIN-ST. JEOR: SCORE: 1773.39

## 2019-09-18 NOTE — PROGRESS NOTES
Per Dr. Doll, patient to have 14 day Ziopatch monitor placed.  Diagnosis: paro a fib  Monitor placed: Yes  Patient Instructed: Yes  Patient verbalized understanding: Yes  Holter # [id number]    Placed By Henna Norton

## 2019-09-18 NOTE — PROGRESS NOTES
Electrophysiology Clinic Note  HPI:     Ms Hernandez is a 59 yo female with symptomatic, paroxysmal atrial fibrillation who underwent successful PVI ablation 8/17/17 and repeat ablation on 2/6/2018.  She is here for follow up.     She was diagnosed with paroxysmal atrial fibrillation in 2015 in the setting of an acute illness but even after recovering she continued to have paroxysms of atrial fibrillation prompting emergency department visits, especially over the past year.  Her symptoms include palpitations, dyspnea, chest discomfort, and weakness.  She has never undergone electrical cardioversion.  She was evaluated at [facility] where she was started on flecainide 50 mg BID but she developed RVR during her stress test and, because the risk of flecainide toxicity could not be excluded, it was discontinued.  Rate control with metoprolol & diltiazem has been impeded by lightheadedness even to the point of ED visits. She was started on amiodarone 400 mg with the intention of performing ablative peripheral vein isolation but she has continued to have episodic AF.  She says that she once again presented to an emergency department 1.5 weeks ago with AF but converted to sinus rhythm after a few doses of metoprolol.  Then she saw one of our NPs, Narda Benavidez, to transfer her care to the [facility] because she and her  have been pleased with her husbands medical care by the gastroenterology group here.  She was in AF in the 80s but had been symptomatic for the 4 days prior so she was scheduled to undergo DCCV.  When she presented, however, she was in sinus rhythm.     EP visit 11/22/17  Patient here post ablation in July.  Still feels that she is having her symptoms of palpitations and fatigue, but they are not nearly as severe.  Her toprol was increased to BID and losartan was decreased to 25mg.  Has not really noticed much change in her symptoms since that change.  She did increase her losartan back to 50mg  daily due to some higher blood pressures that are better controlled now.       No bleeding issues with pradaxa, recently had knee injections for arthritis. Does want to switch back to xarelto because she is having some nuisance bruising.     Wants to come off amiodarone reading more about side affects.     EKG here with sinus bradycardia, normal intervals.     EP Visit 5/2/18:  Since her last visit, she developed recurrent symptomatic AF after her ablation. On 2/6/18 she was admitted for a repeat ablation. Since her second ablation, she has not had any recurrence of her symptoms. She has remained on Amiodarone 100 mg and her ZioPatch shows NO atrial fibrillation. Her LFT's and TFT's are normal as of today.  She has had two mechanical falls over the last 3 weeks, (tripped each time), and has some bruising on her lower extremities in the aftermath of these. No other bleeding complications on Xarelto.      EP visit 11/7/18  She had not experienced any recurrences a month ago. She had knee surgery a month ago and contacted us to discuss stopping the anticoagulation temporarily it went well. After this she got sick with the flu about a week and a half ago and with it she has experienced what she thinks is short episodes of arrhythmia lasting only about a minute the last of which was yesterday.  Otherwise remains on amiodarone. AST, ALT, TSH were normal on 5/2/18. PFT's were done in July showing reduced FEV1 and FVC with normal ratio and normal diffusing capacity suggestive possible mild restriction.She does endorse some SOB with exertion after her surgery. ECG today sows NSR with normal intervals  QRS 82ms.     EP Visit 9/18/2019    Due to her stability w/ afib, her amio was decreased to 100mg q every other day and then discontinued six months later. She returns today for follow up. Patient endorses palpitations every two days. Patient states that she also has SOB after climbing 2-3 flights of stairs.  EKG:NSR QTC:418  Current Medications: Losartan 25mg, Xarelto 20mg qday, Coreg 25mg qday.      PAST MEDICAL HISTORY:  Past Medical History:   Diagnosis Date     Antiplatelet or antithrombotic long-term use      Anxiety      Arrhythmia      Arthritis      Atrial fibrillation (H)      Constipation      Depression      Fibromuscular dysplasia (H)      Gastroesophageal reflux disease      HLD (hyperlipidemia)      HTN (hypertension)      Intraparenchymal renal artery disease (H)      Migraines      Palpitation      PVC's (premature ventricular contractions)        CURRENT MEDICATIONS:  Current Outpatient Medications   Medication Sig Dispense Refill     Acetaminophen (TYLENOL PO) Take 650 mg by mouth every 4 hours as needed for mild pain or fever (for migraines)       amLODIPine (NORVASC) 5 MG tablet TAKE 1 TABLET BY MOUTH EVERY DAY 90 tablet 0     aspirin 81 MG tablet Take 81 mg by mouth every morning       carvedilol (COREG) 25 MG tablet Take 1 tablet (25 mg) by mouth 2 times daily (with meals) 180 tablet 3     HYDROCHLOROTHIAZIDE PO Take 25 mg by mouth daily       HYDROcodone-acetaminophen (NORCO) 5-325 MG per tablet TAKE 1 TABLET BY MOUTH EVERY 6 HOURS AS NEEDED FOR PAIN  0     losartan (COZAAR) 25 MG tablet TAKE 1 TABLET (25 MG) BY MOUTH 2 TIMES DAILY 180 tablet 3     MAGNESIUM OXIDE PO Take 400 mg by mouth 2 times daily        MELATONIN PO Take by mouth At Bedtime       oxyCODONE IR (ROXICODONE) 5 MG tablet Take 5-10 mg by mouth       potassium chloride ER (KLOR-CON) 20 MEQ CR tablet Take 1 tablet (20 mEq) by mouth daily 30 tablet 5     PRAVASTATIN SODIUM PO Take 20 mg by mouth every evening        RANITIDINE HCL PO Take 150 mg by mouth 2 times daily       rivaroxaban ANTICOAGULANT (XARELTO) 20 MG TABS tablet Take 1 tablet (20 mg) by mouth daily (with dinner) 90 tablet 0     rivaroxaban ANTICOAGULANT (XARELTO) 20 MG TABS tablet Take 1 Tab by mouth every evening with a meal.       senna-docusate (SENOKOT-S;PERICOLACE) 8.6-50 MG per  tablet Take 2 tablets by mouth every morning        venlafaxine (EFFEXOR-ER) 150 MG TB24 24 hr tablet Take 150 mg by mouth every evening        VITAMIN D, CHOLECALCIFEROL, PO Take 2,000 Units by mouth daily         PAST SURGICAL HISTORY:  Past Surgical History:   Procedure Laterality Date     AS PTA RENAL/VISCERAL ARTERY Right      C/SECTION, CLASSICAL        SECTION       FUSION CERVICAL ANTERIOR ONE LEVEL      c3-C4     H ABLATION FOCAL AFIB       HYSTERECTOMY       ORTHOPEDIC SURGERY      knee arthroscopy bilaterally      TONSILLECTOMY       TRANSESOPHAGEAL ECHOCARDIOGRAM INTRAOPERATIVE N/A 2018    Procedure: TRANSESOPHAGEAL ECHOCARDIOGRAM INTRAOPERATIVE;;  Surgeon: GENERIC ANESTHESIA PROVIDER;  Location: [facility] OR       ALLERGIES:   No Known Allergies    FAMILY HISTORY:  Family History   Problem Relation Age of Onset     Cancer Mother      Asthma Mother      Heart Murmur Sister         prolapse     Depression Sister      Coronary Artery Disease Father        SOCIAL HISTORY:  Social History     Tobacco Use     Smoking status: Never Smoker     Smokeless tobacco: Never Used   Substance Use Topics     Alcohol use: Yes     Comment: rare      Drug use: No       ROS:   A comprehensive 10 point review of systems negative other than as mentioned in HPI.  Exam:  There were no vitals taken for this visit.  GENERAL APPEARANCE: alert and no distress  HEENT: no icterus, no xanthelasmas, normal pupil size and reaction, normal palate, mucosa moist, no central cyanosis  NECK: no adenopathy, no asymmetry, masses, or scars, thyroid normal to palpation and no bruits, JVP not elevated  RESPIRATORY: lungs clear to auscultation - no rales, rhonchi or wheezes, no use of accessory muscles, no retractions, respirations are unlabored, normal respiratory rate  CARDIOVASCULAR: regular rhythm, normal S1 with physiologic split S2, no S3 or S4 and no murmur, click or rub, precordium quiet with normal PMI.  ABDOMEN: soft, non tender,  bowel sounds normal, non-distended  EXTREMITIES: peripheral pulses normal, no edema  NEURO: alert and oriented to person/place/time, normal speech, gait and affect  SKIN: no ecchymoses, no rashes  PSYCH: normal affect, cooperative    Labs:  Reviewed.     Testing/Procedures:  PULMONARY FUNCTION TESTS:   PFT Latest Ref Rng & Units 7/9/2018   FVC L 2.93   FEV1 L 2.35   FVC% % 73   FEV1% % 75         ECHOCARDIOGRAM:       HESHAM  Interpretation Summary  Left ventricular function, chamber size, wall motion, and wall thickness are  normal.The EF is 55-60%.  The left atrial appendage is normal. It is free of spontaneous echo contrast  and thrombus.     Cardiac MR  1. The LV is normal in cavity size and wall thickness. The global systolic function is normal. The LVEF is 61%. There are no regional wall motion abnormalities.     2. The RV is normal in cavity size. The global systolic function is normal. The RVEF is 70%.      3. Both atria are normal in size.     4. No intracardiac thrombus is visualized.     5. Late gadolinium enhancement imaging shows no MI, fibrosis or infiltrative disease.      6.  A pulmonary venous MRA was performed.  Four pulmonary veins are noted draining into the left atrium  with measurements as described below.  There is no evidence of accessory or anomalous venous drainage.   There is no evidence of pulmonary venous stenosis.        Assessment and Plan:     Ms Hernandez is a 57 yo female with symptomatic, paroxysmal atrial fibrillation who underwent successful PVI ablation 8/17/17 and repeat ablation on 2/6/2018.  She is here for follow up.    Patient presenting with history of recurrent atrial fib requiring PVI on 8/2017 and 2/2018 now currently in NSR. Currently off amiodarone and being anticoagulated with xarelto. Patient now presenting with increasing episodes of SOB and palpitations, increasing suscpion for recurrent afib. Our plan is to obtain repeat TTE and Zio patch to assess for heart function  and to assess afib burden    #Atrial Fibrillation    Rate Control: Continue Coreg 25mg BID    Rhythm Control: S/p PVI ablation in 8/17/17 and 2/6/2018, D/C'd off Amio on 3/2019. Patient currently in NSR. Will obtain repeat zio patch to assess for afib burden and correlation with symptoms. If found to have significant afib burden or if she has newly depressed EF with documented afib, will consider repeat ablation vs DCCV(she has been on Xarelto for some time)    Anticoag: CHADSVASC:(Female, HTN) 2. Continue Xarelto    Pending diagnostics: Will obtain TTE and Zio patch.     The patient states understanding and is agreeable with plan.   This patient was seen and evaluated with Dr. Doll. The above note reflects our joint assessment and plan.     Yolis Amos MD  Cardiology Fellow     EP STAFF NOTE  Patient seen and examined and management plan personally reviewed with the patient. I agree with the note above by the CV/EP fellow.  Husam Doll MD [facility]  Cardiology - Electrophysiology

## 2019-09-18 NOTE — NURSING NOTE
Chief Complaint   Patient presents with     Follow Up     1 yr follow-up p. AF now off amiodarone. Recent hypokalemia, added K+ 20meq. Repeat labs today.     Vitals were taken, medications reconciled and EKG performed.    Ji Ravi CMA    9:21 AM

## 2019-09-18 NOTE — LETTER
9/18/2019      RE: Barb Hernandez  [address]  [facility] [address]       Dear Colleague,    Thank you for the opportunity to participate in the care of your patient, Barb Hernandez, at the [facility]. Please see a copy of my visit note below.    Electrophysiology Clinic Note  HPI:     Ms Hernandez is a 59 yo female with symptomatic, paroxysmal atrial fibrillation who underwent successful PVI ablation 8/17/17 and repeat ablation on 2/6/2018.  She is here for follow up.     She was diagnosed with paroxysmal atrial fibrillation in 2015 in the setting of an acute illness but even after recovering she continued to have paroxysms of atrial fibrillation prompting emergency department visits, especially over the past year.  Her symptoms include palpitations, dyspnea, chest discomfort, and weakness.  She has never undergone electrical cardioversion.  She was evaluated at [facility] where she was started on flecainide 50 mg BID but she developed RVR during her stress test and, because the risk of flecainide toxicity could not be excluded, it was discontinued.  Rate control with metoprolol & diltiazem has been impeded by lightheadedness even to the point of ED visits. She was started on amiodarone 400 mg with the intention of performing ablative peripheral vein isolation but she has continued to have episodic AF.  She says that she once again presented to an emergency department 1.5 weeks ago with AF but converted to sinus rhythm after a few doses of metoprolol.  Then she saw one of our NPs, Narda Benavidez, to transfer her care to the [facility] because she and her  have been pleased with her husbands medical care by the gastroenterology group here.  She was in AF in the 80s but had been symptomatic for the 4 days prior so she was scheduled to undergo DCCV.  When she presented, however, she was in sinus rhythm.     EP visit 11/22/17  Patient here post  ablation in July.  Still feels that she is having her symptoms of palpitations and fatigue, but they are not nearly as severe.  Her toprol was increased to BID and losartan was decreased to 25mg.  Has not really noticed much change in her symptoms since that change.  She did increase her losartan back to 50mg daily due to some higher blood pressures that are better controlled now.       No bleeding issues with pradaxa, recently had knee injections for arthritis. Does want to switch back to xarelto because she is having some nuisance bruising.     Wants to come off amiodarone reading more about side affects.     EKG here with sinus bradycardia, normal intervals.     EP Visit 5/2/18:  Since her last visit, she developed recurrent symptomatic AF after her ablation. On 2/6/18 she was admitted for a repeat ablation. Since her second ablation, she has not had any recurrence of her symptoms. She has remained on Amiodarone 100 mg and her ZioPatch shows NO atrial fibrillation. Her LFT's and TFT's are normal as of today.  She has had two mechanical falls over the last 3 weeks, (tripped each time), and has some bruising on her lower extremities in the aftermath of these. No other bleeding complications on Xarelto.      EP visit 11/7/18  She had not experienced any recurrences a month ago. She had knee surgery a month ago and contacted us to discuss stopping the anticoagulation temporarily it went well. After this she got sick with the flu about a week and a half ago and with it she has experienced what she thinks is short episodes of arrhythmia lasting only about a minute the last of which was yesterday.  Otherwise remains on amiodarone. AST, ALT, TSH were normal on 5/2/18. PFT's were done in July showing reduced FEV1 and FVC with normal ratio and normal diffusing capacity suggestive possible mild restriction.She does endorse some SOB with exertion after her surgery. ECG today sows NSR with normal intervals  QRS 82ms.     EP  Visit 9/18/2019    Due to her stability w/ afib, her amio was decreased to 100mg q every other day and then discontinued six months later. She returns today for follow up. Patient endorses palpitations every two days. Patient states that she also has SOB after climbing 2-3 flights of stairs.  EKG:NSR QTC:418 Current Medications: Losartan 25mg, Xarelto 20mg qday, Coreg 25mg qday.      PAST MEDICAL HISTORY:  Past Medical History:   Diagnosis Date     Antiplatelet or antithrombotic long-term use      Anxiety      Arrhythmia      Arthritis      Atrial fibrillation (H)      Constipation      Depression      Fibromuscular dysplasia (H)      Gastroesophageal reflux disease      HLD (hyperlipidemia)      HTN (hypertension)      Intraparenchymal renal artery disease (H)      Migraines      Palpitation      PVC's (premature ventricular contractions)        CURRENT MEDICATIONS:  Current Outpatient Medications   Medication Sig Dispense Refill     Acetaminophen (TYLENOL PO) Take 650 mg by mouth every 4 hours as needed for mild pain or fever (for migraines)       amLODIPine (NORVASC) 5 MG tablet TAKE 1 TABLET BY MOUTH EVERY DAY 90 tablet 0     aspirin 81 MG tablet Take 81 mg by mouth every morning       carvedilol (COREG) 25 MG tablet Take 1 tablet (25 mg) by mouth 2 times daily (with meals) 180 tablet 3     HYDROCHLOROTHIAZIDE PO Take 25 mg by mouth daily       HYDROcodone-acetaminophen (NORCO) 5-325 MG per tablet TAKE 1 TABLET BY MOUTH EVERY 6 HOURS AS NEEDED FOR PAIN  0     losartan (COZAAR) 25 MG tablet TAKE 1 TABLET (25 MG) BY MOUTH 2 TIMES DAILY 180 tablet 3     MAGNESIUM OXIDE PO Take 400 mg by mouth 2 times daily        MELATONIN PO Take by mouth At Bedtime       oxyCODONE IR (ROXICODONE) 5 MG tablet Take 5-10 mg by mouth       potassium chloride ER (KLOR-CON) 20 MEQ CR tablet Take 1 tablet (20 mEq) by mouth daily 30 tablet 5     PRAVASTATIN SODIUM PO Take 20 mg by mouth every evening        RANITIDINE HCL PO Take 150 mg  by mouth 2 times daily       rivaroxaban ANTICOAGULANT (XARELTO) 20 MG TABS tablet Take 1 tablet (20 mg) by mouth daily (with dinner) 90 tablet 0     rivaroxaban ANTICOAGULANT (XARELTO) 20 MG TABS tablet Take 1 Tab by mouth every evening with a meal.       senna-docusate (SENOKOT-S;PERICOLACE) 8.6-50 MG per tablet Take 2 tablets by mouth every morning        venlafaxine (EFFEXOR-ER) 150 MG TB24 24 hr tablet Take 150 mg by mouth every evening        VITAMIN D, CHOLECALCIFEROL, PO Take 2,000 Units by mouth daily         PAST SURGICAL HISTORY:  Past Surgical History:   Procedure Laterality Date     AS PTA RENAL/VISCERAL ARTERY Right      C/SECTION, CLASSICAL        SECTION       FUSION CERVICAL ANTERIOR ONE LEVEL      c3-C4     H ABLATION FOCAL AFIB       HYSTERECTOMY       ORTHOPEDIC SURGERY      knee arthroscopy bilaterally      TONSILLECTOMY       TRANSESOPHAGEAL ECHOCARDIOGRAM INTRAOPERATIVE N/A 2018    Procedure: TRANSESOPHAGEAL ECHOCARDIOGRAM INTRAOPERATIVE;;  Surgeon: GENERIC ANESTHESIA PROVIDER;  Location: [facility] OR       ALLERGIES:   No Known Allergies    FAMILY HISTORY:  Family History   Problem Relation Age of Onset     Cancer Mother      Asthma Mother      Heart Murmur Sister         prolapse     Depression Sister      Coronary Artery Disease Father        SOCIAL HISTORY:  Social History     Tobacco Use     Smoking status: Never Smoker     Smokeless tobacco: Never Used   Substance Use Topics     Alcohol use: Yes     Comment: rare      Drug use: No       ROS:   A comprehensive 10 point review of systems negative other than as mentioned in HPI.  Exam:  There were no vitals taken for this visit.  GENERAL APPEARANCE: alert and no distress  HEENT: no icterus, no xanthelasmas, normal pupil size and reaction, normal palate, mucosa moist, no central cyanosis  NECK: no adenopathy, no asymmetry, masses, or scars, thyroid normal to palpation and no bruits, JVP not elevated  RESPIRATORY: lungs clear to  auscultation - no rales, rhonchi or wheezes, no use of accessory muscles, no retractions, respirations are unlabored, normal respiratory rate  CARDIOVASCULAR: regular rhythm, normal S1 with physiologic split S2, no S3 or S4 and no murmur, click or rub, precordium quiet with normal PMI.  ABDOMEN: soft, non tender, bowel sounds normal, non-distended  EXTREMITIES: peripheral pulses normal, no edema  NEURO: alert and oriented to person/place/time, normal speech, gait and affect  SKIN: no ecchymoses, no rashes  PSYCH: normal affect, cooperative    Labs:  Reviewed.     Testing/Procedures:  PULMONARY FUNCTION TESTS:   PFT Latest Ref Rng & Units 7/9/2018   FVC L 2.93   FEV1 L 2.35   FVC% % 73   FEV1% % 75         ECHOCARDIOGRAM:       HESHAM  Interpretation Summary  Left ventricular function, chamber size, wall motion, and wall thickness are  normal.The EF is 55-60%.  The left atrial appendage is normal. It is free of spontaneous echo contrast  and thrombus.     Cardiac MR  1. The LV is normal in cavity size and wall thickness. The global systolic function is normal. The LVEF is 61%. There are no regional wall motion abnormalities.     2. The RV is normal in cavity size. The global systolic function is normal. The RVEF is 70%.      3. Both atria are normal in size.     4. No intracardiac thrombus is visualized.     5. Late gadolinium enhancement imaging shows no MI, fibrosis or infiltrative disease.      6.  A pulmonary venous MRA was performed.  Four pulmonary veins are noted draining into the left atrium  with measurements as described below.  There is no evidence of accessory or anomalous venous drainage.   There is no evidence of pulmonary venous stenosis.        Assessment and Plan:     Ms Hernandez is a 59 yo female with symptomatic, paroxysmal atrial fibrillation who underwent successful PVI ablation 8/17/17 and repeat ablation on 2/6/2018.  She is here for follow up.    Patient presenting with history of recurrent atrial  fib requiring PVI on 8/2017 and 2/2018 now currently in NSR. Currently off amiodarone and being anticoagulated with xarelto. Patient now presenting with increasing episodes of SOB and palpitations, increasing suscpion for recurrent afib. Our plan is to obtain repeat TTE and Zio patch to assess for heart function and to assess afib burden    #Atrial Fibrillation    Rate Control: Continue Coreg 25mg BID    Rhythm Control: S/p PVI ablation in 8/17/17 and 2/6/2018, D/C'd off Amio on 3/2019. Patient currently in NSR. Will obtain repeat zio patch to assess for afib burden and correlation with symptoms. If found to have significant afib burden or if she has newly depressed EF with documented afib, will consider repeat ablation vs DCCV(she has been on Xarelto for some time)    Anticoag: CHADSVASC:(Female, HTN) 2. Continue Xarelto    Pending diagnostics: Will obtain TTE and Zio patch.     The patient states understanding and is agreeable with plan.   This patient was seen and evaluated with Dr. Doll. The above note reflects our joint assessment and plan.     Yolis Amos MD  Cardiology Fellow     EP STAFF NOTE  Patient seen and examined and management plan personally reviewed with the patient. I agree with the note above by the CV/EP fellow.  Husam Doll MD [facility]  Cardiology - Electrophysiology

## 2019-09-18 NOTE — PATIENT INSTRUCTIONS
You were seen in the Electrophysiology today by: Dr. Doll    Plan:     Labs/Tests Needed:    14 day ziopatch    Echocardiogram - paper order given to be done locally.     Follow up visit: 3 months        Your Care Team:  EP Cardiology   Telephone Number     Rach Montenegro RN [phone]     For scheduling appts or procedures:    Janae Jain   [phone]   For the Device Clinic (Pacemakers, ICDs, Loop Recorders)    During business hours: [phone]  After business hours:   [phone]- select option 4 and ask for job code [id number].       Cardiovascular Clinic:   [address] [place] [address]. [place] [address]      As always, Thank you for trusting us with your health care needs!

## 2019-09-19 LAB — INTERPRETATION ECG - MUSE: NORMAL

## 2019-10-25 ENCOUNTER — MYC MEDICAL ADVICE (OUTPATIENT)
Dept: CARDIOLOGY | Facility: CLINIC | Age: 59
End: 2019-10-25

## 2019-11-11 DIAGNOSIS — I48.0 PAROXYSMAL ATRIAL FIBRILLATION (H): Primary | ICD-10-CM

## 2019-11-11 RX ORDER — LOSARTAN POTASSIUM 25 MG/1
25 TABLET ORAL 2 TIMES DAILY
Qty: 180 TABLET | Refills: 3 | Status: SHIPPED | OUTPATIENT
Start: 2019-11-11 | End: 2021-08-01

## 2019-11-24 ENCOUNTER — TELEPHONE (OUTPATIENT)
Dept: CARDIOLOGY | Facility: CLINIC | Age: 59
End: 2019-11-24

## 2019-11-24 DIAGNOSIS — I48.0 PAROXYSMAL ATRIAL FIBRILLATION (H): ICD-10-CM

## 2019-11-26 RX ORDER — RIVAROXABAN 20 MG/1
TABLET, FILM COATED ORAL
Qty: 90 TABLET | Refills: 0 | Status: SHIPPED | OUTPATIENT
Start: 2019-11-26 | End: 2019-11-26

## 2019-11-26 NOTE — TELEPHONE ENCOUNTER
XARELTO 20 MG TABLET  Last Written Prescription Date:  8/27/19  Last Fill Quantity: 90,   # refills: 0  Last Office Visit : 9/18/19  Future Office visit:  12/18/19    Routing refill request to provider for review/approval because:  90 day delores x 1 sent now, message re lab due sent to Cards refills per protocol    Creatinine Clearance greater than 50 ml/min on file in past 3 mos      No lab results found.

## 2019-11-26 NOTE — TELEPHONE ENCOUNTER
Xarelto refill approved. Resent Rx for 90 day x3 refills.    Results for THOMAS MARTINEZ (MRN [id number]) as of 11/26/2019 09:44   Ref. Range 9/18/2019 08:45   Creatinine Latest Ref Range: 0.52 - 1.04 mg/dL 0.60   Calculated CrCl: 179.755

## 2019-12-07 DIAGNOSIS — I10 BENIGN ESSENTIAL HYPERTENSION: Primary | ICD-10-CM

## 2019-12-07 DIAGNOSIS — E87.6 HYPOKALEMIA: ICD-10-CM

## 2019-12-09 RX ORDER — POTASSIUM CHLORIDE 1500 MG/1
TABLET, EXTENDED RELEASE ORAL
Qty: 90 TABLET | Refills: 1 | OUTPATIENT
Start: 2019-12-09

## 2019-12-09 RX ORDER — AMLODIPINE BESYLATE 5 MG/1
5 TABLET ORAL DAILY
Qty: 90 TABLET | Refills: 3 | Status: SHIPPED | OUTPATIENT
Start: 2019-12-09 | End: 2021-08-01

## 2019-12-18 ENCOUNTER — OFFICE VISIT (OUTPATIENT)
Dept: CARDIOLOGY | Facility: CLINIC | Age: 59
End: 2019-12-18
Attending: INTERNAL MEDICINE
Payer: COMMERCIAL

## 2019-12-18 VITALS
OXYGEN SATURATION: 96 % | HEIGHT: 69 IN | SYSTOLIC BLOOD PRESSURE: 134 MMHG | DIASTOLIC BLOOD PRESSURE: 81 MMHG | BODY MASS INDEX: 35.37 KG/M2 | HEART RATE: 66 BPM | WEIGHT: 238.8 LBS

## 2019-12-18 DIAGNOSIS — I48.0 PAROXYSMAL ATRIAL FIBRILLATION (H): ICD-10-CM

## 2019-12-18 PROCEDURE — 99214 OFFICE O/P EST MOD 30 MIN: CPT | Mod: 25 | Performed by: INTERNAL MEDICINE

## 2019-12-18 PROCEDURE — 93010 ELECTROCARDIOGRAM REPORT: CPT | Mod: ZP | Performed by: INTERNAL MEDICINE

## 2019-12-18 PROCEDURE — G0463 HOSPITAL OUTPT CLINIC VISIT: HCPCS | Mod: 25,ZF

## 2019-12-18 PROCEDURE — 93005 ELECTROCARDIOGRAM TRACING: CPT | Mod: ZF

## 2019-12-18 RX ORDER — ESTRADIOL 0.05 MG/D
1 PATCH TRANSDERMAL WEEKLY
COMMUNITY
Start: 2019-10-04 | End: 2020-10-03

## 2019-12-18 ASSESSMENT — MIFFLIN-ST. JEOR: SCORE: 1732.57

## 2019-12-18 ASSESSMENT — PAIN SCALES - GENERAL: PAINLEVEL: NO PAIN (0)

## 2019-12-18 NOTE — NURSING NOTE
Labs: Patient was given results of the laboratory testing obtained today. Patient was instructed to return for the next laboratory testing in 1 month for BMMP . Patient demonstrated understanding of this information and agreed to call with further questions or concerns.   Return Appointment: Patient given instructions regarding scheduling next clinic visit in 1 year with Ana Suarez.  Patient to have Ziopatch completed prior to next appointment. Patient demonstrated understanding of this information and agreed to call with further questions or concerns.  Medication Change: Patient was educated regarding prescribed medication change, stopping potassium. Patient demonstrated understanding of this information and agreed to call with further questions or concerns.  Patient stated she understood all health information given and agreed to call with further questions or concerns.    Amina Melendez RN Care Coordinator  Heart Failure CORE

## 2019-12-18 NOTE — PATIENT INSTRUCTIONS
You were seen in the Electrophysiology today by: Dr. Doll    Plan:     Medication Changes: Stop Potassium    Labs/Tests Needed: EDWIN in 1 month    Follow up visit: Ana in 1 year with Gilberto before appointment      Your Care Team:  EP Cardiology   Telephone Number     Rach Montenegro RN [phone]     For scheduling appts or procedures:    Janae Jain   [phone]   For the Device Clinic (Pacemakers, ICDs, Loop Recorders)    During business hours: [phone]  After business hours:   [phone]- select option 4 and ask for job code [id number].       Cardiovascular Clinic:   [address] [place] [address]. [place] [address]      As always, Thank you for trusting us with your health care needs!

## 2019-12-18 NOTE — LETTER
12/18/2019      RE: Barb Hernandez  [address]  [facility] [address]       Dear Colleague,    Thank you for the opportunity to participate in the care of your patient, Barb Hernandez, at the [facility]. Please see a copy of my visit note below.    Electrophysiology Clinic Note  HPI:     Ms Hernandez is a 59 yo female with symptomatic, paroxysmal atrial fibrillation who underwent successful PVI ablation 8/17/17 and repeat ablation on 2/6/2018.  She is here for follow up.     She was diagnosed with paroxysmal atrial fibrillation in 2015 in the setting of an acute illness but even after recovering she continued to have paroxysms of atrial fibrillation prompting emergency department visits, especially over the past year.  Her symptoms include palpitations, dyspnea, chest discomfort, and weakness.  She has never undergone electrical cardioversion.  She was evaluated at [facility] where she was started on flecainide 50 mg BID but she developed RVR during her stress test and, because the risk of flecainide toxicity could not be excluded, it was discontinued.  Rate control with metoprolol & diltiazem has been impeded by lightheadedness even to the point of ED visits. She was started on amiodarone 400 mg with the intention of performing ablative peripheral vein isolation but she has continued to have episodic AF.  She says that she once again presented to an emergency department 1.5 weeks ago with AF but converted to sinus rhythm after a few doses of metoprolol.  Then she saw one of our NPs, Narda Benavidez, to transfer her care to the [place] because she and her  have been pleased with her husbands medical care by the gastroenterology group here.  She was in AF in the 80s but had been symptomatic for the 4 days prior so she was scheduled to undergo DCCV.  When she presented, however, she was in sinus rhythm.     EP visit 11/22/17  Patient here post  ablation in July.  Still feels that she is having her symptoms of palpitations and fatigue, but they are not nearly as severe.  Her toprol was increased to BID and losartan was decreased to 25mg.  Has not really noticed much change in her symptoms since that change.  She did increase her losartan back to 50mg daily due to some higher blood pressures that are better controlled now.       No bleeding issues with pradaxa, recently had knee injections for arthritis. Does want to switch back to xarelto because she is having some nuisance bruising.     Wants to come off amiodarone reading more about side affects.     EKG here with sinus bradycardia, normal intervals.     EP Visit 5/2/18:  Since her last visit, she developed recurrent symptomatic AF after her ablation. On 2/6/18 she was admitted for a repeat ablation. Since her second ablation, she has not had any recurrence of her symptoms. She has remained on Amiodarone 100 mg and her ZioPatch shows NO atrial fibrillation. Her LFT's and TFT's are normal as of today.  She has had two mechanical falls over the last 3 weeks, (tripped each time), and has some bruising on her lower extremities in the aftermath of these. No other bleeding complications on Xarelto.      EP visit 11/7/18  She had not experienced any recurrences a month ago. She had knee surgery a month ago and contacted us to discuss stopping the anticoagulation temporarily it went well. After this she got sick with the flu about a week and a half ago and with it she has experienced what she thinks is short episodes of arrhythmia lasting only about a minute the last of which was yesterday.  Otherwise remains on amiodarone. AST, ALT, TSH were normal on 5/2/18. PFT's were done in July showing reduced FEV1 and FVC with normal ratio and normal diffusing capacity suggestive possible mild restriction.She does endorse some SOB with exertion after her surgery. ECG today sows NSR with normal intervals  QRS 82ms.      EP  Visit 9/18/2019     Due to her stability w/ afib, her amio was decreased to 100mg q every other day and then discontinued six months later. She returns today for follow up. Patient endorses palpitations every two days. Patient states that she also has SOB after climbing 2-3 flights of stairs.  EKG:NSR QTC:418 Current Medications: Losartan 25mg, Xarelto 20mg qday, Coreg 25mg qday.    EP Visit 12/18/2019  Returns today for follow up. Zio patch revealed 13 runs of SVT with longest and fastest run lasting 138BPM and 20.9 sec. TTE on 11/12/2019 revealed an EF of 55%. Patient denied lightheadedness, dizziness, chest pain and SOB. Current medications: Coreg 25mg and Xarelto 20mg      PAST MEDICAL HISTORY:  Past Medical History:   Diagnosis Date     Antiplatelet or antithrombotic long-term use      Anxiety      Arrhythmia      Arthritis      Atrial fibrillation (H)      Constipation      Depression      Fibromuscular dysplasia (H)      Gastroesophageal reflux disease      HLD (hyperlipidemia)      HTN (hypertension)      Intraparenchymal renal artery disease (H)      Migraines      Palpitation      PVC's (premature ventricular contractions)        CURRENT MEDICATIONS:  Current Outpatient Medications   Medication Sig Dispense Refill     Acetaminophen (TYLENOL PO) Take 650 mg by mouth every 4 hours as needed for mild pain or fever (for migraines)       amLODIPine (NORVASC) 5 MG tablet Take 1 tablet (5 mg) by mouth daily 90 tablet 3     carvedilol (COREG) 25 MG tablet Take 1 tablet (25 mg) by mouth 2 times daily (with meals) 180 tablet 3     HYDROCHLOROTHIAZIDE PO Take 25 mg by mouth daily       HYDROcodone-acetaminophen (NORCO) 5-325 MG per tablet TAKE 1 TABLET BY MOUTH EVERY 6 HOURS AS NEEDED FOR PAIN  0     losartan (COZAAR) 25 MG tablet Take 1 tablet (25 mg) by mouth 2 times daily 180 tablet 3     MAGNESIUM OXIDE PO Take 400 mg by mouth 2 times daily        MELATONIN PO Take by mouth At Bedtime       oxyCODONE IR  (ROXICODONE) 5 MG tablet Take 5-10 mg by mouth       potassium chloride ER (KLOR-CON) 20 MEQ CR tablet Take 1 tablet (20 mEq) by mouth daily 30 tablet 5     PRAVASTATIN SODIUM PO Take 20 mg by mouth every evening        RANITIDINE HCL PO Take 150 mg by mouth 2 times daily       rivaroxaban ANTICOAGULANT (XARELTO ANTICOAGULANT) 20 MG TABS tablet Take 1 tablet (20 mg) by mouth daily (with dinner) 90 tablet 3     senna-docusate (SENOKOT-S;PERICOLACE) 8.6-50 MG per tablet Take 2 tablets by mouth every morning        venlafaxine (EFFEXOR-ER) 150 MG TB24 24 hr tablet Take 150 mg by mouth every evening        VITAMIN D, CHOLECALCIFEROL, PO Take 2,000 Units by mouth daily         PAST SURGICAL HISTORY:  Past Surgical History:   Procedure Laterality Date     AS PTA RENAL/VISCERAL ARTERY Right      C/SECTION, CLASSICAL        SECTION       FUSION CERVICAL ANTERIOR ONE LEVEL      c3-C4     H ABLATION FOCAL AFIB       HYSTERECTOMY       ORTHOPEDIC SURGERY      knee arthroscopy bilaterally      TONSILLECTOMY       TRANSESOPHAGEAL ECHOCARDIOGRAM INTRAOPERATIVE N/A 2018    Procedure: TRANSESOPHAGEAL ECHOCARDIOGRAM INTRAOPERATIVE;;  Surgeon: GENERIC ANESTHESIA PROVIDER;  Location: [facility] OR       ALLERGIES:   No Known Allergies    FAMILY HISTORY:  Family History   Problem Relation Age of Onset     Cancer Mother      Asthma Mother      Heart Murmur Sister         prolapse     Depression Sister      Coronary Artery Disease Father        SOCIAL HISTORY:  Social History     Tobacco Use     Smoking status: Never Smoker     Smokeless tobacco: Never Used   Substance Use Topics     Alcohol use: Yes     Comment: rare      Drug use: No       ROS:   A comprehensive 10 point review of systems negative other than as mentioned in HPI.  Exam:  There were no vitals taken for this visit.  GENERAL APPEARANCE: alert and no distress  HEENT: no icterus, no xanthelasmas, normal pupil size and reaction, normal palate, mucosa moist, no central  cyanosis  NECK: no adenopathy, no asymmetry, masses, or scars, thyroid normal to palpation and no bruits, JVP not elevated  RESPIRATORY: lungs clear to auscultation - no rales, rhonchi or wheezes, no use of accessory muscles, no retractions, respirations are unlabored, normal respiratory rate  CARDIOVASCULAR: regular rhythm, normal S1 with physiologic split S2, no S3 or S4 and no murmur, click or rub, precordium quiet with normal PMI.  ABDOMEN: soft, non tender, bowel sounds normal, non-distended  EXTREMITIES: peripheral pulses normal, no edema  NEURO: alert and oriented to person/place/time, normal speech, gait and affect  SKIN: no ecchymoses, no rashes  PSYCH: normal affect, cooperative    Labs:  Reviewed.     Testing/Procedures:  PULMONARY FUNCTION TESTS:   PFT Latest Ref Rng & Units 7/9/2018   FVC L 2.93   FEV1 L 2.35   FVC% % 73   FEV1% % 75         ECHOCARDIOGRAM:     TTE 11/2019    CONCLUSION:    Sinus rhythm during study.     Normal LV size, thickness and systolic function, EF > 55%.     Normal RV size and function.     Mild LA dilatation.     Compared to report of study dated 3/16/2017, LA chamber appears     mildly dilated on present study.          Left Ventricular Ejection Fraction: 60 %             Assessment and Plan:     Ms Hernandez is a 59 yo female with symptomatic, paroxysmal atrial fibrillation who underwent successful PVI ablation 8/17/17 and repeat ablation on 2/6/2018.  She is here for follow up.     Patient presenting with history of recurrent atrial fib requiring PVI on 8/2017 and 2/2018 now currently in NSR. Currently off amiodarone and being anticoagulated with xarelto.  During last visit experienced episodes of SOB and palpitations, zio patch revealed symptoms correlated with sinus rhythm mainly and not SVT or afib.     #Atrial Fibrillation     Rate Control: Continue Coreg 25mg BID     Rhythm Control: S/p PVI ablation in 8/17/17 and 2/6/2018, D/C'd off Amio on 3/2019. Patient currently in  NSR.  Symptoms correlated with NSR on Zio Patch, additionally symptoms have improved. Will defer rhythm strategy for now.        Anticoag: CHADSVASC:(Female, HTN) 2. Continue Xarelto       Work up: Will have patient follow up in one year with zio patch shortly before visit.      The patient states understanding and is agreeable with plan.   This patient was seen and evaluated with Dr. Doll. The above note reflects our joint assessment and plan.      Yolis Amos MD  Cardiology Fellow      EP STAFF NOTE  Patient seen and examined and management plan personally reviewed with the patient. I agree with the note above by the CV/EP fellow.  Husam Doll MD [facility]  Cardiology - Electrophysiology

## 2019-12-18 NOTE — PROGRESS NOTES
Electrophysiology Clinic Note  HPI:     Ms Hernandez is a 59 yo female with symptomatic, paroxysmal atrial fibrillation who underwent successful PVI ablation 8/17/17 and repeat ablation on 2/6/2018.  She is here for follow up.     She was diagnosed with paroxysmal atrial fibrillation in 2015 in the setting of an acute illness but even after recovering she continued to have paroxysms of atrial fibrillation prompting emergency department visits, especially over the past year.  Her symptoms include palpitations, dyspnea, chest discomfort, and weakness.  She has never undergone electrical cardioversion.  She was evaluated at [facility] where she was started on flecainide 50 mg BID but she developed RVR during her stress test and, because the risk of flecainide toxicity could not be excluded, it was discontinued.  Rate control with metoprolol & diltiazem has been impeded by lightheadedness even to the point of ED visits. She was started on amiodarone 400 mg with the intention of performing ablative peripheral vein isolation but she has continued to have episodic AF.  She says that she once again presented to an emergency department 1.5 weeks ago with AF but converted to sinus rhythm after a few doses of metoprolol.  Then she saw one of our NPs, Narda Benavidez, to transfer her care to the [place] because she and her  have been pleased with her husbands medical care by the gastroenterology group here.  She was in AF in the 80s but had been symptomatic for the 4 days prior so she was scheduled to undergo DCCV.  When she presented, however, she was in sinus rhythm.     EP visit 11/22/17  Patient here post ablation in July.  Still feels that she is having her symptoms of palpitations and fatigue, but they are not nearly as severe.  Her toprol was increased to BID and losartan was decreased to 25mg.  Has not really noticed much change in her symptoms since that change.  She did increase her losartan back to 50mg  daily due to some higher blood pressures that are better controlled now.       No bleeding issues with pradaxa, recently had knee injections for arthritis. Does want to switch back to xarelto because she is having some nuisance bruising.     Wants to come off amiodarone reading more about side affects.     EKG here with sinus bradycardia, normal intervals.     EP Visit 5/2/18:  Since her last visit, she developed recurrent symptomatic AF after her ablation. On 2/6/18 she was admitted for a repeat ablation. Since her second ablation, she has not had any recurrence of her symptoms. She has remained on Amiodarone 100 mg and her ZioPatch shows NO atrial fibrillation. Her LFT's and TFT's are normal as of today.  She has had two mechanical falls over the last 3 weeks, (tripped each time), and has some bruising on her lower extremities in the aftermath of these. No other bleeding complications on Xarelto.      EP visit 11/7/18  She had not experienced any recurrences a month ago. She had knee surgery a month ago and contacted us to discuss stopping the anticoagulation temporarily it went well. After this she got sick with the flu about a week and a half ago and with it she has experienced what she thinks is short episodes of arrhythmia lasting only about a minute the last of which was yesterday.  Otherwise remains on amiodarone. AST, ALT, TSH were normal on 5/2/18. PFT's were done in July showing reduced FEV1 and FVC with normal ratio and normal diffusing capacity suggestive possible mild restriction.She does endorse some SOB with exertion after her surgery. ECG today sows NSR with normal intervals  QRS 82ms.      EP Visit 9/18/2019     Due to her stability w/ afib, her amio was decreased to 100mg q every other day and then discontinued six months later. She returns today for follow up. Patient endorses palpitations every two days. Patient states that she also has SOB after climbing 2-3 flights of stairs.  EKG:NSR QTC:418  Current Medications: Losartan 25mg, Xarelto 20mg qday, Coreg 25mg qday.    EP Visit 12/18/2019  Returns today for follow up. Zio patch revealed 13 runs of SVT with longest and fastest run lasting 138BPM and 20.9 sec. TTE on 11/12/2019 revealed an EF of 55%. Patient denied lightheadedness, dizziness, chest pain and SOB. Current medications: Coreg 25mg and Xarelto 20mg      PAST MEDICAL HISTORY:  Past Medical History:   Diagnosis Date     Antiplatelet or antithrombotic long-term use      Anxiety      Arrhythmia      Arthritis      Atrial fibrillation (H)      Constipation      Depression      Fibromuscular dysplasia (H)      Gastroesophageal reflux disease      HLD (hyperlipidemia)      HTN (hypertension)      Intraparenchymal renal artery disease (H)      Migraines      Palpitation      PVC's (premature ventricular contractions)        CURRENT MEDICATIONS:  Current Outpatient Medications   Medication Sig Dispense Refill     Acetaminophen (TYLENOL PO) Take 650 mg by mouth every 4 hours as needed for mild pain or fever (for migraines)       amLODIPine (NORVASC) 5 MG tablet Take 1 tablet (5 mg) by mouth daily 90 tablet 3     carvedilol (COREG) 25 MG tablet Take 1 tablet (25 mg) by mouth 2 times daily (with meals) 180 tablet 3     HYDROCHLOROTHIAZIDE PO Take 25 mg by mouth daily       HYDROcodone-acetaminophen (NORCO) 5-325 MG per tablet TAKE 1 TABLET BY MOUTH EVERY 6 HOURS AS NEEDED FOR PAIN  0     losartan (COZAAR) 25 MG tablet Take 1 tablet (25 mg) by mouth 2 times daily 180 tablet 3     MAGNESIUM OXIDE PO Take 400 mg by mouth 2 times daily        MELATONIN PO Take by mouth At Bedtime       oxyCODONE IR (ROXICODONE) 5 MG tablet Take 5-10 mg by mouth       potassium chloride ER (KLOR-CON) 20 MEQ CR tablet Take 1 tablet (20 mEq) by mouth daily 30 tablet 5     PRAVASTATIN SODIUM PO Take 20 mg by mouth every evening        RANITIDINE HCL PO Take 150 mg by mouth 2 times daily       rivaroxaban ANTICOAGULANT (XARELTO  ANTICOAGULANT) 20 MG TABS tablet Take 1 tablet (20 mg) by mouth daily (with dinner) 90 tablet 3     senna-docusate (SENOKOT-S;PERICOLACE) 8.6-50 MG per tablet Take 2 tablets by mouth every morning        venlafaxine (EFFEXOR-ER) 150 MG TB24 24 hr tablet Take 150 mg by mouth every evening        VITAMIN D, CHOLECALCIFEROL, PO Take 2,000 Units by mouth daily         PAST SURGICAL HISTORY:  Past Surgical History:   Procedure Laterality Date     AS PTA RENAL/VISCERAL ARTERY Right      C/SECTION, CLASSICAL        SECTION       FUSION CERVICAL ANTERIOR ONE LEVEL      c3-C4     H ABLATION FOCAL AFIB       HYSTERECTOMY       ORTHOPEDIC SURGERY      knee arthroscopy bilaterally      TONSILLECTOMY       TRANSESOPHAGEAL ECHOCARDIOGRAM INTRAOPERATIVE N/A 2018    Procedure: TRANSESOPHAGEAL ECHOCARDIOGRAM INTRAOPERATIVE;;  Surgeon: GENERIC ANESTHESIA PROVIDER;  Location: [facility] OR       ALLERGIES:   No Known Allergies    FAMILY HISTORY:  Family History   Problem Relation Age of Onset     Cancer Mother      Asthma Mother      Heart Murmur Sister         prolapse     Depression Sister      Coronary Artery Disease Father        SOCIAL HISTORY:  Social History     Tobacco Use     Smoking status: Never Smoker     Smokeless tobacco: Never Used   Substance Use Topics     Alcohol use: Yes     Comment: rare      Drug use: No       ROS:   A comprehensive 10 point review of systems negative other than as mentioned in HPI.  Exam:  There were no vitals taken for this visit.  GENERAL APPEARANCE: alert and no distress  HEENT: no icterus, no xanthelasmas, normal pupil size and reaction, normal palate, mucosa moist, no central cyanosis  NECK: no adenopathy, no asymmetry, masses, or scars, thyroid normal to palpation and no bruits, JVP not elevated  RESPIRATORY: lungs clear to auscultation - no rales, rhonchi or wheezes, no use of accessory muscles, no retractions, respirations are unlabored, normal respiratory rate  CARDIOVASCULAR:  regular rhythm, normal S1 with physiologic split S2, no S3 or S4 and no murmur, click or rub, precordium quiet with normal PMI.  ABDOMEN: soft, non tender, bowel sounds normal, non-distended  EXTREMITIES: peripheral pulses normal, no edema  NEURO: alert and oriented to person/place/time, normal speech, gait and affect  SKIN: no ecchymoses, no rashes  PSYCH: normal affect, cooperative    Labs:  Reviewed.     Testing/Procedures:  PULMONARY FUNCTION TESTS:   PFT Latest Ref Rng & Units 7/9/2018   FVC L 2.93   FEV1 L 2.35   FVC% % 73   FEV1% % 75         ECHOCARDIOGRAM:     TTE 11/2019    CONCLUSION:    Sinus rhythm during study.     Normal LV size, thickness and systolic function, EF > 55%.     Normal RV size and function.     Mild LA dilatation.     Compared to report of study dated 3/16/2017, LA chamber appears     mildly dilated on present study.          Left Ventricular Ejection Fraction: 60 %             Assessment and Plan:     Ms Hernandez is a 59 yo female with symptomatic, paroxysmal atrial fibrillation who underwent successful PVI ablation 8/17/17 and repeat ablation on 2/6/2018.  She is here for follow up.     Patient presenting with history of recurrent atrial fib requiring PVI on 8/2017 and 2/2018 now currently in NSR. Currently off amiodarone and being anticoagulated with xarelto. During last visit experienced episodes of SOB and palpitations, zio patch revealed symptoms correlated with sinus rhythm mainly and not SVT or afib.     #Atrial Fibrillation     Rate Control: Continue Coreg 25mg BID     Rhythm Control: S/p PVI ablation in 8/17/17 and 2/6/2018, D/C'd off Amio on 3/2019. Patient currently in NSR.  Symptoms correlated with NSR on Zio Patch, additionally symptoms have improved. Will defer rhythm strategy for now.        Anticoag: CHADSVASC:(Female, HTN) 2. Continue Xarelto       Work up: Will have patient follow up in one year with zio patch shortly before visit.      The patient states understanding  and is agreeable with plan.   This patient was seen and evaluated with Dr. Doll. The above note reflects our joint assessment and plan.      Yolis Amos MD  Cardiology Fellow      EP STAFF NOTE  Patient seen and examined and management plan personally reviewed with the patient. I agree with the note above by the CV/EP fellow.  Husam Doll MD [facility]  Cardiology - Electrophysiology

## 2019-12-18 NOTE — NURSING NOTE
Vitals completed successfully and medication reconciled. EKG done.   Mindy Hernández CMA  8:27 AM  Chief Complaint   Patient presents with     Follow Up     3 month follow up

## 2019-12-19 LAB — INTERPRETATION ECG - MUSE: NORMAL

## 2020-03-11 ENCOUNTER — HEALTH MAINTENANCE LETTER (OUTPATIENT)
Age: 60
End: 2020-03-11

## 2020-12-17 ENCOUNTER — TELEPHONE (OUTPATIENT)
Dept: CARDIOLOGY | Facility: CLINIC | Age: 60
End: 2020-12-17

## 2020-12-17 DIAGNOSIS — I48.0 PAROXYSMAL ATRIAL FIBRILLATION (H): ICD-10-CM

## 2020-12-21 RX ORDER — RIVAROXABAN 20 MG/1
TABLET, FILM COATED ORAL
Qty: 90 TABLET | Refills: 0 | Status: SHIPPED | OUTPATIENT
Start: 2020-12-21 | End: 2021-08-01

## 2020-12-22 NOTE — TELEPHONE ENCOUNTER
Noted - sent My[facility] message to pt to coordinate f/up.    Carito Virgen, BSN, RN, PHN  Electrophysiology Nurse Coordinator

## 2020-12-22 NOTE — TELEPHONE ENCOUNTER
XARELTO 20 MG TABLET   Last Written Prescription Date:  11/26/2019  Last Fill Quantity: 90,   # refills: 3  Last Office Visit : 12/18/2019  Future Office visit:  One year, not scehduled  (Patient given instructions regarding scheduling next clinic visit in 1 year with Ana Suarez.  Patient to have Ziopatch completed prior to next appointment)       Labs due( Cr and PLT) , appt due. 90 day delores sent per protocol.    09/18/19                CR 0.60

## 2020-12-27 ENCOUNTER — HEALTH MAINTENANCE LETTER (OUTPATIENT)
Age: 60
End: 2020-12-27

## 2021-04-24 ENCOUNTER — HEALTH MAINTENANCE LETTER (OUTPATIENT)
Age: 61
End: 2021-04-24

## 2021-05-20 ENCOUNTER — TELEPHONE (OUTPATIENT)
Dept: CARDIOLOGY | Facility: CLINIC | Age: 61
End: 2021-05-20

## 2021-07-28 ENCOUNTER — OFFICE VISIT (OUTPATIENT)
Dept: CARDIOLOGY | Facility: CLINIC | Age: 61
End: 2021-07-28
Attending: NURSE PRACTITIONER
Payer: COMMERCIAL

## 2021-07-28 VITALS
BODY MASS INDEX: 34.65 KG/M2 | DIASTOLIC BLOOD PRESSURE: 81 MMHG | HEART RATE: 63 BPM | SYSTOLIC BLOOD PRESSURE: 133 MMHG | OXYGEN SATURATION: 97 % | HEIGHT: 70 IN | WEIGHT: 242 LBS

## 2021-07-28 DIAGNOSIS — I10 BENIGN ESSENTIAL HYPERTENSION: ICD-10-CM

## 2021-07-28 DIAGNOSIS — I48.0 PAF (PAROXYSMAL ATRIAL FIBRILLATION) (H): Primary | ICD-10-CM

## 2021-07-28 DIAGNOSIS — I48.0 PAROXYSMAL ATRIAL FIBRILLATION (H): ICD-10-CM

## 2021-07-28 PROCEDURE — G0463 HOSPITAL OUTPT CLINIC VISIT: HCPCS | Mod: 25

## 2021-07-28 PROCEDURE — 93005 ELECTROCARDIOGRAM TRACING: CPT

## 2021-07-28 PROCEDURE — 99214 OFFICE O/P EST MOD 30 MIN: CPT | Performed by: NURSE PRACTITIONER

## 2021-07-28 RX ORDER — FAMOTIDINE 20 MG/1
20 TABLET, FILM COATED ORAL 2 TIMES DAILY
COMMUNITY

## 2021-07-28 ASSESSMENT — PAIN SCALES - GENERAL: PAINLEVEL: NO PAIN (0)

## 2021-07-28 ASSESSMENT — MIFFLIN-ST. JEOR: SCORE: 1759.2

## 2021-07-28 NOTE — NURSING NOTE
Chief Complaint   Patient presents with     Follow Up     s/p afib ablation 02/2018      Vitals were taken, medications reconciled, and EKG performed.    Brett Gibbs  11:04 AM

## 2021-07-28 NOTE — PROGRESS NOTES
Electrophysiology Clinic Note  HPI:     Ms. Hernandez is a 61 year old female who has a past medical history significant for PAF (CHADSVASC 2 on Xarelto) s/p PVI 8/17/17 s/p PVI 2/6/18, HTN, HLD, anxiety, and depression.  She is here for follow up.     She was diagnosed with paroxysmal atrial fibrillation in 2015 in the setting of an acute illness but even after recovering she continued to have paroxysms of atrial fibrillation prompting emergency department visits, especially over the past year.  Her symptoms include palpitations, dyspnea, chest discomfort, and weakness.  She has never undergone electrical cardioversion.  She was evaluated at [facility] where she was started on flecainide 50 mg BID but she developed RVR during her stress test and, because the risk of flecainide toxicity could not be excluded, it was discontinued.  Rate control with metoprolol & diltiazem has been impeded by lightheadedness even to the point of ED visits. She was started on amiodarone 400 mg with the intention of performing ablative peripheral vein isolation but she has continued to have episodic AF.  She says that she once again presented to an emergency department 1.5 weeks ago with AF but converted to sinus rhythm after a few doses of metoprolol.  Then she saw one of our NPs, Narda Benavidez, to transfer her care to the [facility] because she and her  have been pleased with her husbands medical care by the gastroenterology group here.  She was in AF in the 80s but had been symptomatic for the 4 days prior so she was scheduled to undergo DCCV.  When she presented, however, she was in sinus rhythm.     EP visit 11/22/17: Patient here post ablation in July.  Still feels that she is having her symptoms of palpitations and fatigue, but they are not nearly as severe.  Her toprol was increased to BID and losartan was decreased to 25mg.  Has not really noticed much change in her symptoms since that change.  She did increase her  losartan back to 50mg daily due to some higher blood pressures that are better controlled now.  No bleeding issues with pradaxa, recently had knee injections for arthritis. Does want to switch back to xarelto because she is having some nuisance bruising.Wants to come off amiodarone reading more about side affects.EKG here with sinus bradycardia, normal intervals.     EP Visit 5/2/18: Since her last visit, she developed recurrent symptomatic AF after her ablation. On 2/6/18 she was admitted for a repeat ablation. Since her second ablation, she has not had any recurrence of her symptoms. She has remained on Amiodarone 100 mg and her ZioPatch shows NO atrial fibrillation. Her LFT's and TFT's are normal as of today.  She has had two mechanical falls over the last 3 weeks, (tripped each time), and has some bruising on her lower extremities in the aftermath of these. No other bleeding complications on Xarelto.      EP visit 11/7/18: She had not experienced any recurrences a month ago. She had knee surgery a month ago and contacted us to discuss stopping the anticoagulation temporarily it went well. After this she got sick with the flu about a week and a half ago and with it she has experienced what she thinks is short episodes of arrhythmia lasting only about a minute the last of which was yesterday.  Otherwise remains on amiodarone. AST, ALT, TSH were normal on 5/2/18. PFT's were done in July showing reduced FEV1 and FVC with normal ratio and normal diffusing capacity suggestive possible mild restriction.She does endorse some SOB with exertion after her surgery. ECG today sows NSR with normal intervals  QRS 82ms.      EP Visit 9/18/2019: Due to her stability w/ afib, her amio was decreased to 100mg q every other day and then discontinued six months later. She returns today for follow up. Patient endorses palpitations every two days. Patient states that she also has SOB after climbing 2-3 flights of stairs.  EKG:NSR  QTC:418 Current Medications: Losartan 25mg, Xarelto 20mg qday, Coreg 25mg qday.    EP Visit 12/18/2019: Returns today for follow up. Zio patch revealed 13 runs of SVT with longest and fastest run lasting 138BPM and 20.9 sec. TTE on 11/12/2019 revealed an EF of 55%. Patient denied lightheadedness, dizziness, chest pain and SOB. Current medications: Coreg 25mg and Xarelto 20mg.    She presents today for follow up. She reports feeling well from cardiac standpoint. She has had some psychosocial stressors recently that she is coping with. She denies any chest pain/pressures, dizziness, lightheadedness, worsening shortness of breath, leg/ankle swelling, PND, orthopnea, palpitations, or syncopal symptoms. Presenting 12 lead ECG shows NSR Vent Rate 61 bpm,  ms, QRS 82 ms, QTc 406 ms. Current cardiac medications include: Coreg, Norvasc, Losartan,  Pravastatin, and Xarelto.        PAST MEDICAL HISTORY:  Past Medical History:   Diagnosis Date     Antiplatelet or antithrombotic long-term use      Anxiety      Arrhythmia      Arthritis      Atrial fibrillation (H)      Constipation      Depression      Fibromuscular dysplasia (H)      Gastroesophageal reflux disease      HLD (hyperlipidemia)      HTN (hypertension)      Intraparenchymal renal artery disease (H)      Migraines      Palpitation      PVC's (premature ventricular contractions)        CURRENT MEDICATIONS:  Current Outpatient Medications   Medication Sig Dispense Refill     Acetaminophen (TYLENOL PO) Take 650 mg by mouth every 4 hours as needed for mild pain or fever (for migraines)       amLODIPine (NORVASC) 5 MG tablet Take 1 tablet (5 mg) by mouth daily 90 tablet 3     carvedilol (COREG) 25 MG tablet Take 1 tablet (25 mg) by mouth 2 times daily (with meals) 180 tablet 3     estradiol (CLIMARA) 0.05 MG/24HR weekly patch Place 1 patch onto the skin once a week       HYDROCHLOROTHIAZIDE PO Take 25 mg by mouth daily       HYDROcodone-acetaminophen (NORCO) 5-325  MG per tablet TAKE 1 TABLET BY MOUTH EVERY 6 HOURS AS NEEDED FOR PAIN  0     losartan (COZAAR) 25 MG tablet Take 1 tablet (25 mg) by mouth 2 times daily 180 tablet 3     MAGNESIUM OXIDE PO Take 400 mg by mouth 2 times daily        MELATONIN PO Take by mouth At Bedtime       oxyCODONE IR (ROXICODONE) 5 MG tablet Take 5-10 mg by mouth       PRAVASTATIN SODIUM PO Take 20 mg by mouth every evening        RANITIDINE HCL PO Take 150 mg by mouth 2 times daily       senna-docusate (SENOKOT-S;PERICOLACE) 8.6-50 MG per tablet Take 2 tablets by mouth every morning        venlafaxine (EFFEXOR-ER) 150 MG TB24 24 hr tablet Take 150 mg by mouth every evening        VITAMIN D, CHOLECALCIFEROL, PO Take 2,000 Units by mouth daily       XARELTO ANTICOAGULANT 20 MG TABS tablet TAKE 1 TABLET BY MOUTH DAILY WITH DINNER 90 tablet 0       PAST SURGICAL HISTORY:  Past Surgical History:   Procedure Laterality Date     AS PTA RENAL/VISCERAL ARTERY Right      C/SECTION, CLASSICAL        SECTION       FUSION CERVICAL ANTERIOR ONE LEVEL      c3-C4     H ABLATION FOCAL AFIB       HYSTERECTOMY       ORTHOPEDIC SURGERY      knee arthroscopy bilaterally      TONSILLECTOMY       TRANSESOPHAGEAL ECHOCARDIOGRAM INTRAOPERATIVE N/A 2018    Procedure: TRANSESOPHAGEAL ECHOCARDIOGRAM INTRAOPERATIVE;;  Surgeon: GENERIC ANESTHESIA PROVIDER;  Location: [facility] OR       ALLERGIES:   No Known Allergies    FAMILY HISTORY:  Family History   Problem Relation Age of Onset     Cancer Mother      Asthma Mother      Heart Murmur Sister         prolapse     Depression Sister      Coronary Artery Disease Father        SOCIAL HISTORY:  Social History     Tobacco Use     Smoking status: Never Smoker     Smokeless tobacco: Never Used   Substance Use Topics     Alcohol use: Yes     Comment: rare      Drug use: No       ROS:   A comprehensive 10 point review of systems negative other than as mentioned in HPI.  Exam:  /81 (BP Location: Left arm, Patient Position:  "Sitting, Cuff Size: Adult Large)   Pulse 63   Ht 1.788 m (5' 10.39\")   Wt 109.8 kg (242 lb)   SpO2 97%   BMI 34.34 kg/m    GENERAL APPEARANCE: alert and no distress  HEENT: no icterus, no xanthelasmas, normal pupil size and reaction, normal palate, mucosa moist, no central cyanosis  NECK: no adenopathy, no asymmetry, masses, or scars, thyroid normal to palpation and no bruits, JVP not elevated  RESPIRATORY: lungs clear to auscultation - no rales, rhonchi or wheezes, no use of accessory muscles, no retractions, respirations are unlabored, normal respiratory rate  CARDIOVASCULAR: regular rhythm, normal S1 with physiologic split S2, no S3 or S4 and no murmur, click or rub, precordium quiet with normal PMI.  ABDOMEN: soft, non tender, bowel sounds normal, non-distended  EXTREMITIES: peripheral pulses normal, no edema  NEURO: alert and oriented to person/place/time, normal speech, gait and affect  SKIN: no ecchymoses, no rashes  PSYCH: normal affect, cooperative    Labs:  Reviewed.     Testing/Procedures:  PULMONARY FUNCTION TESTS:   PFT Latest Ref Rng & Units 7/9/2018   FVC L 2.93   FEV1 L 2.35   FVC% % 73   FEV1% % 75         ECHOCARDIOGRAM:     TTE 11/2019    CONCLUSION:    Sinus rhythm during study.     Normal LV size, thickness and systolic function, EF > 55%.     Normal RV size and function.     Mild LA dilatation.     Compared to report of study dated 3/16/2017, LA chamber appears     mildly dilated on present study.          Left Ventricular Ejection Fraction: 60 %       Assessment and Plan:   Ms. Hernandez is a 61 year old female who has a past medical history significant for PAF (CHADSVASC 2 on Xarelto) s/p PVI 8/17/17 s/p PVI 2/6/18, HTN, HLD, anxiety, and depression. She presents today for follow up. She reports feeling well from cardiac standpoint. She has had some psychosocial stressors recently that she is coping with. She denies any chest pain/pressures, dizziness, lightheadedness, worsening shortness " of breath, leg/ankle swelling, PND, orthopnea, palpitations, or syncopal symptoms. Presenting 12 lead ECG shows NSR Vent Rate 61 bpm,  ms, QRS 82 ms, QTc 406 ms. Current cardiac medications include: Coreg, Norvasc, Losartan,  Pravastatin, and Xarelto.     Paroxsymal Atrial Fibrillation  Hypertension:  1. Stroke Prophylaxis:  CHADSVASC=+gender  2, corresponding to a 2.2% annual stroke / systemic emolism event rate. indicating need for long term oral anticoagulation.  She is on Xarelto. No bleeding issues.   2. Rate Control: Continue Coreg.   3. Rhythm Control: Cardioversion, Antiarrhythmics and/or ablation are options for rhythm control. S/p PVI X2 last in 2/2018. Doing well without recurrences thus far.   4. Risk Factor Management: Statin, BP control- reports well controlled on home monitoring continue BB,ARB, and Norvasc, and BRENNAN evaluation as indicated.       Follow up in 2 years or sooner if need arises.      The patient states understanding and is agreeable with plan.   ADRIANNA Rojas CNP  Electrophysiology Consult Service  Pager: [phone]

## 2021-07-28 NOTE — PATIENT INSTRUCTIONS
You were seen in the Electrophysiology Clinic today by: Ana Bella NP    Plan:     Medication Changes:       Labs/Tests Needed:      Follow up visit:      Further Instructions:      Your Care Team:  EP Cardiology   Telephone Number     Nurse Line [phone]     For scheduling appts or procedures:    Janae Jain   [phone]   For the Device Clinic (Pacemakers, ICDs, Loop Recorders)    During business hours: [phone]  After business hours:   [phone]- select option 4 and ask for job code [id number].     On-call cardiologist for after hours or on weekends: [phone], option #4, and ask to speak to the on-call cardiologist.     Cardiovascular Clinic:   [address] [facility] [place] [address]      As always, Thank you for trusting us with your health care needs!

## 2021-07-28 NOTE — LETTER
7/28/2021      RE: Barb Hernandez  [address]  [place] [address]       Dear Colleague,    Thank you for the opportunity to participate in the care of your patient, Barb Hernandez, at the [facility] [place] at [facility]. Please see a copy of my visit note below.    Electrophysiology Clinic Note  HPI:     Ms. Hernandez is a 61 year old female who has a past medical history significant for PAF (CHADSVASC 2 on Xarelto) s/p PVI 8/17/17 s/p PVI 2/6/18, HTN, HLD, anxiety, and depression.  She is here for follow up.     She was diagnosed with paroxysmal atrial fibrillation in 2015 in the setting of an acute illness but even after recovering she continued to have paroxysms of atrial fibrillation prompting emergency department visits, especially over the past year.  Her symptoms include palpitations, dyspnea, chest discomfort, and weakness.  She has never undergone electrical cardioversion.  She was evaluated at [facility] where she was started on flecainide 50 mg BID but she developed RVR during her stress test and, because the risk of flecainide toxicity could not be excluded, it was discontinued.  Rate control with metoprolol & diltiazem has been impeded by lightheadedness even to the point of ED visits. She was started on amiodarone 400 mg with the intention of performing ablative peripheral vein isolation but she has continued to have episodic AF.  She says that she once again presented to an emergency department 1.5 weeks ago with AF but converted to sinus rhythm after a few doses of metoprolol.  Then she saw one of our NPs, Narda Benavidez, to transfer her care to the [facility] because she and her  have been pleased with her husbands medical care by the gastroenterology group here.  She was in AF in the 80s but had been symptomatic for the 4 days prior so she was scheduled to undergo DCCV.  When she presented, however, she was in sinus  rhythm.     EP visit 11/22/17: Patient here post ablation in July.  Still feels that she is having her symptoms of palpitations and fatigue, but they are not nearly as severe.  Her toprol was increased to BID and losartan was decreased to 25mg.  Has not really noticed much change in her symptoms since that change.  She did increase her losartan back to 50mg daily due to some higher blood pressures that are better controlled now.  No bleeding issues with pradaxa, recently had knee injections for arthritis. Does want to switch back to xarelto because she is having some nuisance bruising.Wants to come off amiodarone reading more about side affects.EKG here with sinus bradycardia, normal intervals.     EP Visit 5/2/18: Since her last visit, she developed recurrent symptomatic AF after her ablation. On 2/6/18 she was admitted for a repeat ablation. Since her second ablation, she has not had any recurrence of her symptoms. She has remained on Amiodarone 100 mg and her ZioPatch shows NO atrial fibrillation. Her LFT's and TFT's are normal as of today.  She has had two mechanical falls over the last 3 weeks, (tripped each time), and has some bruising on her lower extremities in the aftermath of these. No other bleeding complications on Xarelto.      EP visit 11/7/18: She had not experienced any recurrences a month ago. She had knee surgery a month ago and contacted us to discuss stopping the anticoagulation temporarily it went well. After this she got sick with the flu about a week and a half ago and with it she has experienced what she thinks is short episodes of arrhythmia lasting only about a minute the last of which was yesterday.  Otherwise remains on amiodarone. AST, ALT, TSH were normal on 5/2/18. PFT's were done in July showing reduced FEV1 and FVC with normal ratio and normal diffusing capacity suggestive possible mild restriction.She does endorse some SOB with exertion after her surgery. ECG today sows NSR with  normal intervals  QRS 82ms.      EP Visit 9/18/2019: Due to her stability w/ afib, her amio was decreased to 100mg q every other day and then discontinued six months later. She returns today for follow up. Patient endorses palpitations every two days. Patient states that she also has SOB after climbing 2-3 flights of stairs.  EKG:NSR QTC:418 Current Medications: Losartan 25mg, Xarelto 20mg qday, Coreg 25mg qday.    EP Visit 12/18/2019: Returns today for follow up. Zio patch revealed 13 runs of SVT with longest and fastest run lasting 138BPM and 20.9 sec. TTE on 11/12/2019 revealed an EF of 55%. Patient denied lightheadedness, dizziness, chest pain and SOB. Current medications: Coreg 25mg and Xarelto 20mg.    She presents today for follow up. She reports feeling well from cardiac standpoint. She has had some psychosocial stressors recently that she is coping with. She denies any chest pain/pressures, dizziness, lightheadedness, worsening shortness of breath, leg/ankle swelling, PND, orthopnea, palpitations, or syncopal symptoms. Presenting 12 lead ECG shows NSR Vent Rate 61 bpm,  ms, QRS 82 ms, QTc 406 ms. Current cardiac medications include: Coreg, Norvasc, Losartan,  Pravastatin, and Xarelto.        PAST MEDICAL HISTORY:  Past Medical History:   Diagnosis Date     Antiplatelet or antithrombotic long-term use      Anxiety      Arrhythmia      Arthritis      Atrial fibrillation (H)      Constipation      Depression      Fibromuscular dysplasia (H)      Gastroesophageal reflux disease      HLD (hyperlipidemia)      HTN (hypertension)      Intraparenchymal renal artery disease (H)      Migraines      Palpitation      PVC's (premature ventricular contractions)        CURRENT MEDICATIONS:  Current Outpatient Medications   Medication Sig Dispense Refill     Acetaminophen (TYLENOL PO) Take 650 mg by mouth every 4 hours as needed for mild pain or fever (for migraines)       amLODIPine (NORVASC) 5 MG tablet Take 1  tablet (5 mg) by mouth daily 90 tablet 3     carvedilol (COREG) 25 MG tablet Take 1 tablet (25 mg) by mouth 2 times daily (with meals) 180 tablet 3     estradiol (CLIMARA) 0.05 MG/24HR weekly patch Place 1 patch onto the skin once a week       HYDROCHLOROTHIAZIDE PO Take 25 mg by mouth daily       HYDROcodone-acetaminophen (NORCO) 5-325 MG per tablet TAKE 1 TABLET BY MOUTH EVERY 6 HOURS AS NEEDED FOR PAIN  0     losartan (COZAAR) 25 MG tablet Take 1 tablet (25 mg) by mouth 2 times daily 180 tablet 3     MAGNESIUM OXIDE PO Take 400 mg by mouth 2 times daily        MELATONIN PO Take by mouth At Bedtime       oxyCODONE IR (ROXICODONE) 5 MG tablet Take 5-10 mg by mouth       PRAVASTATIN SODIUM PO Take 20 mg by mouth every evening        RANITIDINE HCL PO Take 150 mg by mouth 2 times daily       senna-docusate (SENOKOT-S;PERICOLACE) 8.6-50 MG per tablet Take 2 tablets by mouth every morning        venlafaxine (EFFEXOR-ER) 150 MG TB24 24 hr tablet Take 150 mg by mouth every evening        VITAMIN D, CHOLECALCIFEROL, PO Take 2,000 Units by mouth daily       XARELTO ANTICOAGULANT 20 MG TABS tablet TAKE 1 TABLET BY MOUTH DAILY WITH DINNER 90 tablet 0       PAST SURGICAL HISTORY:  Past Surgical History:   Procedure Laterality Date     AS PTA RENAL/VISCERAL ARTERY Right      C/SECTION, CLASSICAL        SECTION       FUSION CERVICAL ANTERIOR ONE LEVEL      c3-C4     H ABLATION FOCAL AFIB       HYSTERECTOMY       ORTHOPEDIC SURGERY      knee arthroscopy bilaterally      TONSILLECTOMY       TRANSESOPHAGEAL ECHOCARDIOGRAM INTRAOPERATIVE N/A 2018    Procedure: TRANSESOPHAGEAL ECHOCARDIOGRAM INTRAOPERATIVE;;  Surgeon: GENERIC ANESTHESIA PROVIDER;  Location: [facility] OR       ALLERGIES:   No Known Allergies    FAMILY HISTORY:  Family History   Problem Relation Age of Onset     Cancer Mother      Asthma Mother      Heart Murmur Sister         prolapse     Depression Sister      Coronary Artery Disease Father        SOCIAL  "HISTORY:  Social History     Tobacco Use     Smoking status: Never Smoker     Smokeless tobacco: Never Used   Substance Use Topics     Alcohol use: Yes     Comment: rare      Drug use: No       ROS:   A comprehensive 10 point review of systems negative other than as mentioned in HPI.  Exam:  /81 (BP Location: Left arm, Patient Position: Sitting, Cuff Size: Adult Large)   Pulse 63   Ht 1.788 m (5' 10.39\")   Wt 109.8 kg (242 lb)   SpO2 97%   BMI 34.34 kg/m    GENERAL APPEARANCE: alert and no distress  HEENT: no icterus, no xanthelasmas, normal pupil size and reaction, normal palate, mucosa moist, no central cyanosis  NECK: no adenopathy, no asymmetry, masses, or scars, thyroid normal to palpation and no bruits, JVP not elevated  RESPIRATORY: lungs clear to auscultation - no rales, rhonchi or wheezes, no use of accessory muscles, no retractions, respirations are unlabored, normal respiratory rate  CARDIOVASCULAR: regular rhythm, normal S1 with physiologic split S2, no S3 or S4 and no murmur, click or rub, precordium quiet with normal PMI.  ABDOMEN: soft, non tender, bowel sounds normal, non-distended  EXTREMITIES: peripheral pulses normal, no edema  NEURO: alert and oriented to person/place/time, normal speech, gait and affect  SKIN: no ecchymoses, no rashes  PSYCH: normal affect, cooperative    Labs:  Reviewed.     Testing/Procedures:  PULMONARY FUNCTION TESTS:   PFT Latest Ref Rng & Units 7/9/2018   FVC L 2.93   FEV1 L 2.35   FVC% % 73   FEV1% % 75         ECHOCARDIOGRAM:     TTE 11/2019    CONCLUSION:    Sinus rhythm during study.     Normal LV size, thickness and systolic function, EF > 55%.     Normal RV size and function.     Mild LA dilatation.     Compared to report of study dated 3/16/2017, LA chamber appears     mildly dilated on present study.          Left Ventricular Ejection Fraction: 60 %       Assessment and Plan:   Ms. Hernandez is a 61 year old female who has a past medical history " significant for PAF (CHADSVASC 2 on Xarelto) s/p PVI 8/17/17 s/p PVI 2/6/18, HTN, HLD, anxiety, and depression. She presents today for follow up. She reports feeling well from cardiac standpoint. She has had some psychosocial stressors recently that she is coping with. She denies any chest pain/pressures, dizziness, lightheadedness, worsening shortness of breath, leg/ankle swelling, PND, orthopnea, palpitations, or syncopal symptoms. Presenting 12 lead ECG shows NSR Vent Rate 61 bpm,  ms, QRS 82 ms, QTc 406 ms. Current cardiac medications include: Coreg, Norvasc, Losartan,  Pravastatin, and Xarelto.     Paroxsymal Atrial Fibrillation  Hypertension:  1. Stroke Prophylaxis:  CHADSVASC=+gender  2, corresponding to a 2.2% annual stroke / systemic emolism event rate. indicating need for long term oral anticoagulation.  She is on Xarelto. No bleeding issues.   2. Rate Control: Continue Coreg.   3. Rhythm Control: Cardioversion, Antiarrhythmics and/or ablation are options for rhythm control. S/p PVI X2 last in 2/2018. Doing well without recurrences thus far.   4. Risk Factor Management: Statin, BP control- reports well controlled on home monitoring continue BB,ARB, and Norvasc, and BRENNAN evaluation as indicated.       Follow up in 2 years or sooner if need arises.      The patient states understanding and is agreeable with plan.   ADRIANNA Rojas CNP  Electrophysiology Consult Service  Pager: [phone]

## 2021-08-01 RX ORDER — AMLODIPINE BESYLATE 5 MG/1
5 TABLET ORAL DAILY
Qty: 90 TABLET | Refills: 6 | Status: SHIPPED | OUTPATIENT
Start: 2021-08-01

## 2021-08-01 RX ORDER — LOSARTAN POTASSIUM 25 MG/1
75 TABLET ORAL DAILY
Qty: 270 TABLET | Refills: 6 | Status: SHIPPED | OUTPATIENT
Start: 2021-08-01

## 2021-08-01 RX ORDER — CARVEDILOL 25 MG/1
25 TABLET ORAL 2 TIMES DAILY WITH MEALS
Qty: 180 TABLET | Refills: 6 | Status: SHIPPED | OUTPATIENT
Start: 2021-08-01

## 2021-08-03 LAB
ATRIAL RATE - MUSE: 61 BPM
DIASTOLIC BLOOD PRESSURE - MUSE: NORMAL MMHG
INTERPRETATION ECG - MUSE: NORMAL
P AXIS - MUSE: -11 DEGREES
PR INTERVAL - MUSE: 168 MS
QRS DURATION - MUSE: 82 MS
QT - MUSE: 404 MS
QTC - MUSE: 406 MS
R AXIS - MUSE: 27 DEGREES
SYSTOLIC BLOOD PRESSURE - MUSE: NORMAL MMHG
T AXIS - MUSE: 40 DEGREES
VENTRICULAR RATE- MUSE: 61 BPM

## 2021-10-09 ENCOUNTER — HEALTH MAINTENANCE LETTER (OUTPATIENT)
Age: 61
End: 2021-10-09

## 2021-12-13 ENCOUNTER — TELEPHONE (OUTPATIENT)
Dept: CARDIOLOGY | Facility: CLINIC | Age: 61
End: 2021-12-13
Payer: COMMERCIAL

## 2021-12-13 NOTE — TELEPHONE ENCOUNTER
Had breathing studies done at [facility] and her results were not good. She is wanting to see a pulmonary doctor here and wants to have one that works with Dr. Doll so that they can be on the same team.     Ana can you call with suggestions?    Janae Jain  [facility] Electrophysiology   [phone]

## 2022-02-07 NOTE — TELEPHONE ENCOUNTER
RECORDS RECEIVED FROM: internal/ce    DATE RECEIVED: 3.29.22   NOTES STATUS DETAILS   OFFICE NOTE from referring provider N/A    OFFICE NOTE from other specialist In process    DISCHARGE SUMMARY from hospital N/A    DISCHARGE REPORT from the ER N/A    MEDICATION LIST Internal    IMAGING  (NEED IMAGES AND REPORTS)     CT SCAN CE - 12.23.21   CHEST XRAY (CXR) Ce - 12.23.21   TESTS     PULMONARY FUNCTION TESTING (PFT) Internal 7.9.18  - 12.9.21 received       Action 2.7.22 MJ   Action Taken Requested all related medical records from [facility]    2/8/22 sv- [place] [facility] [place]anning   Image request sent to [facility] for -   CT-12.23.21-received image-   CXR- 12.23.21 - received image-

## 2022-03-08 ENCOUNTER — TELEPHONE (OUTPATIENT)
Dept: PULMONOLOGY | Facility: CLINIC | Age: 62
End: 2022-03-08
Payer: COMMERCIAL

## 2022-03-08 NOTE — TELEPHONE ENCOUNTER
RECORDS RECEIVED FROM: internal/ce     DATE RECEIVED: 3.11.22    NOTES STATUS DETAILS   OFFICE NOTE from referring provider N/A     OFFICE NOTE from other specialist In process     DISCHARGE SUMMARY from hospital N/A     DISCHARGE REPORT from the ER N/A     MEDICATION LIST Internal     IMAGING  (NEED IMAGES AND REPORTS)       CT SCAN CE - 12.23.21   CHEST XRAY (CXR) Ce - 12.23.21   TESTS       PULMONARY FUNCTION TESTING (PFT) Internal 7.9.18  - 12.9.21 received       Action 2.7.22 MJ   Action Taken [place] [facility] [place]     2/8/22 sv- received reports from [facility] [place] - sent to unloading/scanning   Image request sent to [facility] for -   CT-12.23.21-received image-   CXR- 12.23.21 - received image-

## 2022-03-08 NOTE — TELEPHONE ENCOUNTER
Spoke with pt regarding moving up NEW pulm appt scheduled on 3/29 as pt requested to be placed on wait list. Pt is agreeable to appt with Dr. Germain for this Friday and appt with Dr. Parish 3/29 was cancelled. Details confirmed with pt

## 2022-03-11 ENCOUNTER — OFFICE VISIT (OUTPATIENT)
Dept: PULMONOLOGY | Facility: CLINIC | Age: 62
End: 2022-03-11
Attending: INTERNAL MEDICINE
Payer: COMMERCIAL

## 2022-03-11 ENCOUNTER — PRE VISIT (OUTPATIENT)
Dept: PULMONOLOGY | Facility: CLINIC | Age: 62
End: 2022-03-11
Payer: COMMERCIAL

## 2022-03-11 VITALS
HEIGHT: 70 IN | OXYGEN SATURATION: 96 % | WEIGHT: 235 LBS | DIASTOLIC BLOOD PRESSURE: 75 MMHG | SYSTOLIC BLOOD PRESSURE: 126 MMHG | HEART RATE: 63 BPM | BODY MASS INDEX: 33.64 KG/M2

## 2022-03-11 DIAGNOSIS — J45.909 MILD REACTIVE AIRWAYS DISEASE, UNSPECIFIED WHETHER PERSISTENT: Primary | ICD-10-CM

## 2022-03-11 PROCEDURE — G0463 HOSPITAL OUTPT CLINIC VISIT: HCPCS

## 2022-03-11 PROCEDURE — 99205 OFFICE O/P NEW HI 60 MIN: CPT | Performed by: INTERNAL MEDICINE

## 2022-03-11 ASSESSMENT — ENCOUNTER SYMPTOMS
WEIGHT LOSS: 0
EYES NEGATIVE: 1
WHEEZING: 0
STRIDOR: 0
SPUTUM PRODUCTION: 0
SINUS PAIN: 0
NAUSEA: 0
HEMOPTYSIS: 0
FEVER: 0
COUGH: 1
CHILLS: 0
HEARTBURN: 0
PND: 0
MYALGIAS: 0
SHORTNESS OF BREATH: 1
BRUISES/BLEEDS EASILY: 0
VOMITING: 0

## 2022-03-11 ASSESSMENT — PAIN SCALES - GENERAL: PAINLEVEL: NO PAIN (0)

## 2022-03-11 NOTE — PROGRESS NOTES
Pulmonary Clinic  New Patient Evaluation    Name: Barb Hrenandez MRN: [id number]     Age: 61 year old   YOB: 1960             HPI:   CC: Cough, possible COPD    Barb Hernandez is a 61 year old female with H/O atrial fibrillation status post ablation and deviated nasal septum presents to discuss bronchitis and cough.    She reports a history of frequent bronchitis with a particularly concerning episode which began around 2021.  During that time she had an intrusive hacking dry cough.  She was given a course of prednisone which resulted in her cough becoming productive.  Approximately 1 week after onset she began coughing up thick dark black sputum.  She also feels that her sinuses opened up with relief of sinus pressure and sinus drainage.  Around that time she was also prescribed Flovent inhaler.  She was prescribed albuterol rescue inhaler but this triggered her atrial fibrillation so it was changed to leave albuterol.    Since then she feels that she is nearly completely back to baseline.  She continues to have rare coughing spells but these tend to be short-lived and nonproductive.  She denies fevers, malaise, or night sweats.  She does have dyspnea which has been stable for years.  She is able to walk 1 flight of stairs before having to stop and catch her breath.    Seen by ENT on 2022 for recurrent sinus infections, septal deviation, and possible nasal polyps.    She has a family history of lung cancer.  Her mother  from lung cancer.  She was a heavy smoker and had agent orange exposure.  Her brother also  from lung cancer.  He was a heavy smoker and had some potential exposures through the [employer].      Exposure History:   Tobacco: Never   smokes.  With the onset of the patient's cough, her  transitioned to only smoking outside.  The patient does note that secondhand smoke causes coughing.  Other inhaled substances (Vaping, hookah.  Marijuana): None   Occupation: Banker  Pets: Dogs. Feels that new pug (from brother) makes worse,   Allergies: Dust, notices trouble near the river in the summer.  She thinks it is probably something from the trees.  Hobbies: None  Home: House, mostly hardwood/tile, forced air. Filters changed every month. Noticed lots of coughing when moved in last year. Had vents cleaned last fall, told worst they've ever seen.   Worse when vacuuming  Not much wood smoke exposure           Past Medical History:     Past Medical History:   Diagnosis Date     Antiplatelet or antithrombotic long-term use      Anxiety      Arrhythmia      Arthritis      Atrial fibrillation (H)      Constipation      Depression      Fibromuscular dysplasia (H)      Gastroesophageal reflux disease      HLD (hyperlipidemia)      HTN (hypertension)      Intraparenchymal renal artery disease (H)      Migraines      Palpitation      PVC's (premature ventricular contractions)              Past Surgical History:      Past Surgical History:   Procedure Laterality Date     AS PTA RENAL/VISCERAL ARTERY Right      C/SECTION, CLASSICAL        SECTION       FUSION CERVICAL ANTERIOR ONE LEVEL      c3-C4     H ABLATION FOCAL AFIB       HYSTERECTOMY       ORTHOPEDIC SURGERY      knee arthroscopy bilaterally      TONSILLECTOMY       TRANSESOPHAGEAL ECHOCARDIOGRAM INTRAOPERATIVE N/A 2018    Procedure: TRANSESOPHAGEAL ECHOCARDIOGRAM INTRAOPERATIVE;;  Surgeon: GENERIC ANESTHESIA PROVIDER;  Location:  OR             Social History:     Social History     Socioeconomic History     Marital status:      Spouse name: Not on file     Number of children: Not on file     Years of education: Not on file     Highest education level: Not on file   Occupational History     Not on file   Tobacco Use     Smoking status: Never Smoker     Smokeless tobacco: Never Used   Substance and Sexual Activity     Alcohol use: Yes     Comment: rare      Drug use: No     Sexual  activity: Yes   Other Topics Concern     Not on file   Social History Narrative     Not on file     Social Determinants of Health     Financial Resource Strain: Not on file   Food Insecurity: Not on file   Transportation Needs: Not on file   Physical Activity: Not on file   Stress: Not on file   Social Connections: Not on file   Intimate Partner Violence: Not on file   Housing Stability: Not on file            Family History:     Family History   Problem Relation Age of Onset     Cancer Mother      Asthma Mother      Heart Murmur Sister         prolapse     Depression Sister      Coronary Artery Disease Father              Immunizations:     There is no immunization history on file for this patient.          Allergies:   No Known Allergies          Medications:   Acetaminophen (TYLENOL PO), Take 650 mg by mouth every 4 hours as needed for mild pain or fever (for migraines)  amLODIPine (NORVASC) 5 MG tablet, Take 1 tablet (5 mg) by mouth daily  carvedilol (COREG) 25 MG tablet, Take 1 tablet (25 mg) by mouth 2 times daily (with meals)  estradiol (CLIMARA) 0.05 MG/24HR weekly patch, Place 1 patch onto the skin once a week  famotidine (PEPCID) 20 MG tablet, Take 20 mg by mouth 2 times daily  HYDROCHLOROTHIAZIDE PO, Take 25 mg by mouth daily  losartan (COZAAR) 25 MG tablet, Take 3 tablets (75 mg) by mouth daily  MELATONIN PO, Take by mouth At Bedtime  PRAVASTATIN SODIUM PO, Take 20 mg by mouth every evening   rivaroxaban ANTICOAGULANT (XARELTO ANTICOAGULANT) 20 MG TABS tablet, Take 1 tablet (20 mg) by mouth daily (with dinner)  senna-docusate (SENOKOT-S;PERICOLACE) 8.6-50 MG per tablet, Take 2 tablets by mouth every morning   venlafaxine (EFFEXOR-ER) 150 MG TB24 24 hr tablet, Take 150 mg by mouth every evening   VITAMIN D, CHOLECALCIFEROL, PO, Take 2,000 Units by mouth daily    No current facility-administered medications on file prior to visit.           Review of Systems:     Review of Systems   Constitutional:  "Negative for chills, fever and weight loss.   HENT: Negative for congestion and sinus pain.    Eyes: Negative.    Respiratory: Positive for cough and shortness of breath. Negative for hemoptysis, sputum production, wheezing and stridor.    Cardiovascular: Negative for chest pain, leg swelling and PND.   Gastrointestinal: Negative for heartburn, nausea and vomiting.   Musculoskeletal: Negative for myalgias.   Endo/Heme/Allergies: Does not bruise/bleed easily.              Exam:   /75   Pulse 63   Ht 1.765 m (5' 9.5\")   Wt 106.6 kg (235 lb)   SpO2 96%   BMI 34.21 kg/m      Physical Exam  Vitals and nursing note reviewed.   Constitutional:       Appearance: She is obese.   Eyes:      Extraocular Movements: Extraocular movements intact.      Conjunctiva/sclera: Conjunctivae normal.      Pupils: Pupils are equal, round, and reactive to light.   Cardiovascular:      Rate and Rhythm: Regular rhythm. Bradycardia present.      Heart sounds: No murmur heard.  Pulmonary:      Effort: Pulmonary effort is normal.      Breath sounds: Normal breath sounds. No wheezing, rhonchi or rales.   Musculoskeletal:         General: Normal range of motion.      Right lower leg: No edema.      Left lower leg: No edema.   Skin:     General: Skin is warm and dry.   Neurological:      General: No focal deficit present.      Mental Status: She is alert and oriented to person, place, and time.       Fingernails without clubbing         Data:     [facility]: 12/9/2021 [facility]  The FVC is 2.84 L, 69% predicted, FEV1 is 2.35 L 74% predicted.  The FEV1/FVC ratio is 83.  Of note the FEF 25 is 5.43 or 96% predicted, but this leticia to 6.31, 16% change following administration of bronchodilators.  The lung volumes are within normal limits, however the RV/TLC ratio is elevated suggesting possible air trapping.  The diffusion capacity is within normal limits.    IMPRESSION:  Possible reversible obstruction in the small airways as evidenced by " the improvement in the FEF 25 and elevated RV/TLC.      Compared to 7/9/2018 there is no change in the raw numbers.      PFT 7/9/2018      CT CHEST 12/23/2021 ([facility])  No CT explanation for the chronic cough. Multifocal mild areas of scarring and atelectasis. No bronchiectasis or signs of chronic interstitial lung disease.        All studies listed here were independently reviewed and interpreted by me today.          Assessment and Plan:     ## Reactive airways disease  This patient experienced persistent cough productive of black sputum in November of last year.  Given her family history of a mother and brother dying from lung cancer, she was quite concerned that her symptoms could be due to cancer.  She is a never smoker (though she does have secondhand exposure), and she has no occupational or recreational exposures.  CT scan is completely within normal limits without any findings concerning for potential malignancy or infection.  Additionally I do not see evidence of emphysema.  Therefore, I think malignancy is quite unlikely in her case.  I suspect her cough is due to reactive airways disease, mild asthma versus allergy/asthma overlap.  She has had frequent bronchitis, and her symptoms in November dramatically improved with steroid burst and initiation of a steroid maintenance inhaler.  Her PFTs do not indicate COPD, but are possibly consistent with reversible obstruction of the small airways and air trapping.  She reports symptomatic allergies when exposed to her dogs, dust, and on identified environmental triggers.  At this point I recommend she continue using her Flovent inhaler as well as her leave albuterol as needed and prior to exertion.  She could also try an over-the-counter allergy pill to see if this improves her symptoms.  If additional relief is required, the addition of montelukast is a consideration.  Regarding her exercise intolerance, this may be due to deconditioning.  I advised that  she increase her activity level.  Additionally, she was bradycardic on my exam which may be secondary to over beta-blockade in the setting of atrial fibrillation treatment if her exercise intolerance continues, adjustment of her beta-blocker may improve the symptoms.        Return to clinic: I gave her the option of following up with me or continuing to work with her primary care provider and see me on a as needed basis.  She opted for the latter.        I personally spent 67 minutes on the date of the encounter doing chart review, history and exam, documentation and further activities per the note.    Michael Germain M.D.  Pulmonary & Critical Care  Pager: Click Here to page      The above note was dictated using voice recognition software and may include typographical errors. Please contact the author for any clarifications.

## 2022-03-11 NOTE — NURSING NOTE
Chief Complaint   Patient presents with     New Patient     CT and PFT COPD     Vitals were taken and medications were reconciled.     Henna Norton RMA  11:55 AM

## 2022-03-11 NOTE — LETTER
3/11/2022     RE: Barb Hernandez  [address]  [place] [address]    Dear Colleague,    Thank you for referring your patient, Barb Hernandez, to the [facility] [place]. Please see a copy of my visit note below.              Pulmonary Clinic  New Patient Evaluation    Name: Barb Hernandez MRN: [id number]     Age: 61 year old   YOB: 1960             HPI:   CC: Cough, possible COPD    Barb Hernandez is a 61 year old female with H/O atrial fibrillation status post ablation and deviated nasal septum presents to discuss bronchitis and cough.    She reports a history of frequent bronchitis with a particularly concerning episode which began around 2021.  During that time she had an intrusive hacking dry cough.  She was given a course of prednisone which resulted in her cough becoming productive.  Approximately 1 week after onset she began coughing up thick dark black sputum.  She also feels that her sinuses opened up with relief of sinus pressure and sinus drainage.  Around that time she was also prescribed Flovent inhaler.  She was prescribed albuterol rescue inhaler but this triggered her atrial fibrillation so it was changed to leave albuterol.    Since then she feels that she is nearly completely back to baseline.  She continues to have rare coughing spells but these tend to be short-lived and nonproductive.  She denies fevers, malaise, or night sweats.  She does have dyspnea which has been stable for years.  She is able to walk 1 flight of stairs before having to stop and catch her breath.    Seen by ENT on 2022 for recurrent sinus infections, septal deviation, and possible nasal polyps.    She has a family history of lung cancer.  Her mother  from lung cancer.  She was a heavy smoker and had agent orange exposure.  Her brother also  from lung cancer.  He was a heavy smoker and had some potential exposures  through the [employer].      Exposure History:   Tobacco: Never   smokes.  With the onset of the patient's cough, her  transitioned to only smoking outside.  The patient does note that secondhand smoke causes coughing.  Other inhaled substances (Vaping, hookah. Marijuana): None   Occupation: Banker  Pets: Dogs. Feels that new pug (from brother) makes worse,   Allergies: Dust, notices trouble near the river in the summer.  She thinks it is probably something from the trees.  Hobbies: None  Home: House, mostly hardwood/tile, forced air. Filters changed every month. Noticed lots of coughing when moved in last year. Had vents cleaned last fall, told worst they've ever seen.   Worse when vacuuming  Not much wood smoke exposure           Past Medical History:     Past Medical History:   Diagnosis Date     Antiplatelet or antithrombotic long-term use      Anxiety      Arrhythmia      Arthritis      Atrial fibrillation (H)      Constipation      Depression      Fibromuscular dysplasia (H)      Gastroesophageal reflux disease      HLD (hyperlipidemia)      HTN (hypertension)      Intraparenchymal renal artery disease (H)      Migraines      Palpitation      PVC's (premature ventricular contractions)              Past Surgical History:      Past Surgical History:   Procedure Laterality Date     AS PTA RENAL/VISCERAL ARTERY Right      C/SECTION, CLASSICAL        SECTION       FUSION CERVICAL ANTERIOR ONE LEVEL      c3-C4     H ABLATION FOCAL AFIB       HYSTERECTOMY       ORTHOPEDIC SURGERY      knee arthroscopy bilaterally      TONSILLECTOMY       TRANSESOPHAGEAL ECHOCARDIOGRAM INTRAOPERATIVE N/A 2018    Procedure: TRANSESOPHAGEAL ECHOCARDIOGRAM INTRAOPERATIVE;;  Surgeon: GENERIC ANESTHESIA PROVIDER;  Location:  OR             Social History:     Social History     Socioeconomic History     Marital status:      Spouse name: Not on file     Number of children: Not on file     Years of education: Not  on file     Highest education level: Not on file   Occupational History     Not on file   Tobacco Use     Smoking status: Never Smoker     Smokeless tobacco: Never Used   Substance and Sexual Activity     Alcohol use: Yes     Comment: rare      Drug use: No     Sexual activity: Yes   Other Topics Concern     Not on file   Social History Narrative     Not on file     Social Determinants of Health     Financial Resource Strain: Not on file   Food Insecurity: Not on file   Transportation Needs: Not on file   Physical Activity: Not on file   Stress: Not on file   Social Connections: Not on file   Intimate Partner Violence: Not on file   Housing Stability: Not on file            Family History:     Family History   Problem Relation Age of Onset     Cancer Mother      Asthma Mother      Heart Murmur Sister         prolapse     Depression Sister      Coronary Artery Disease Father              Immunizations:     There is no immunization history on file for this patient.          Allergies:   No Known Allergies          Medications:   Acetaminophen (TYLENOL PO), Take 650 mg by mouth every 4 hours as needed for mild pain or fever (for migraines)  amLODIPine (NORVASC) 5 MG tablet, Take 1 tablet (5 mg) by mouth daily  carvedilol (COREG) 25 MG tablet, Take 1 tablet (25 mg) by mouth 2 times daily (with meals)  estradiol (CLIMARA) 0.05 MG/24HR weekly patch, Place 1 patch onto the skin once a week  famotidine (PEPCID) 20 MG tablet, Take 20 mg by mouth 2 times daily  HYDROCHLOROTHIAZIDE PO, Take 25 mg by mouth daily  losartan (COZAAR) 25 MG tablet, Take 3 tablets (75 mg) by mouth daily  MELATONIN PO, Take by mouth At Bedtime  PRAVASTATIN SODIUM PO, Take 20 mg by mouth every evening   rivaroxaban ANTICOAGULANT (XARELTO ANTICOAGULANT) 20 MG TABS tablet, Take 1 tablet (20 mg) by mouth daily (with dinner)  senna-docusate (SENOKOT-S;PERICOLACE) 8.6-50 MG per tablet, Take 2 tablets by mouth every morning   venlafaxine (EFFEXOR-ER) 150 MG  "TB24 24 hr tablet, Take 150 mg by mouth every evening   VITAMIN D, CHOLECALCIFEROL, PO, Take 2,000 Units by mouth daily    No current facility-administered medications on file prior to visit.           Review of Systems:     Review of Systems   Constitutional: Negative for chills, fever and weight loss.   HENT: Negative for congestion and sinus pain.    Eyes: Negative.    Respiratory: Positive for cough and shortness of breath. Negative for hemoptysis, sputum production, wheezing and stridor.    Cardiovascular: Negative for chest pain, leg swelling and PND.   Gastrointestinal: Negative for heartburn, nausea and vomiting.   Musculoskeletal: Negative for myalgias.   Endo/Heme/Allergies: Does not bruise/bleed easily.              Exam:   /75   Pulse 63   Ht 1.765 m (5' 9.5\")   Wt 106.6 kg (235 lb)   SpO2 96%   BMI 34.21 kg/m      Physical Exam  Vitals and nursing note reviewed.   Constitutional:       Appearance: She is obese.   Eyes:      Extraocular Movements: Extraocular movements intact.      Conjunctiva/sclera: Conjunctivae normal.      Pupils: Pupils are equal, round, and reactive to light.   Cardiovascular:      Rate and Rhythm: Regular rhythm. Bradycardia present.      Heart sounds: No murmur heard.  Pulmonary:      Effort: Pulmonary effort is normal.      Breath sounds: Normal breath sounds. No wheezing, rhonchi or rales.   Musculoskeletal:         General: Normal range of motion.      Right lower leg: No edema.      Left lower leg: No edema.   Skin:     General: Skin is warm and dry.   Neurological:      General: No focal deficit present.      Mental Status: She is alert and oriented to person, place, and time.       Fingernails without clubbing         Data:     PFT: 12/9/2021 [facility])  The FVC is 2.84 L, 69% predicted, FEV1 is 2.35 L 74% predicted.  The FEV1/FVC ratio is 83.  Of note the FEF 25 is 5.43 or 96% predicted, but this leticia to 6.31, 16% change following administration of " bronchodilators.  The lung volumes are within normal limits, however the RV/TLC ratio is elevated suggesting possible air trapping.  The diffusion capacity is within normal limits.    IMPRESSION:  Possible reversible obstruction in the small airways as evidenced by the improvement in the FEF 25 and elevated RV/TLC.      Compared to 7/9/2018 there is no change in the raw numbers.      PFT 7/9/2018      CT CHEST 12/23/2021 ([facility])  No CT explanation for the chronic cough. Multifocal mild areas of scarring and atelectasis. No bronchiectasis or signs of chronic interstitial lung disease.        All studies listed here were independently reviewed and interpreted by me today.          Assessment and Plan:     ## Reactive airways disease  This patient experienced persistent cough productive of black sputum in November of last year.  Given her family history of a mother and brother dying from lung cancer, she was quite concerned that her symptoms could be due to cancer.  She is a never smoker (though she does have secondhand exposure), and she has no occupational or recreational exposures.  CT scan is completely within normal limits without any findings concerning for potential malignancy or infection.  Additionally I do not see evidence of emphysema.  Therefore, I think malignancy is quite unlikely in her case.  I suspect her cough is due to reactive airways disease, mild asthma versus allergy/asthma overlap.  She has had frequent bronchitis, and her symptoms in November dramatically improved with steroid burst and initiation of a steroid maintenance inhaler.  Her PFTs do not indicate COPD, but are possibly consistent with reversible obstruction of the small airways and air trapping.  She reports symptomatic allergies when exposed to her dogs, dust, and on identified environmental triggers.  At this point I recommend she continue using her Flovent inhaler as well as her leave albuterol as needed and prior to  exertion.  She could also try an over-the-counter allergy pill to see if this improves her symptoms.  If additional relief is required, the addition of montelukast is a consideration.  Regarding her exercise intolerance, this may be due to deconditioning.  I advised that she increase her activity level.  Additionally, she was bradycardic on my exam which may be secondary to over beta-blockade in the setting of atrial fibrillation treatment if her exercise intolerance continues, adjustment of her beta-blocker may improve the symptoms.        Return to clinic: I gave her the option of following up with me or continuing to work with her primary care provider and see me on a as needed basis.  She opted for the latter.    I personally spent 67 minutes on the date of the encounter doing chart review, history and exam, documentation and further activities per the note.    Michael Germain M.D.  Pulmonary & Critical Care  Pager: Click Here to page    The above note was dictated using voice recognition software and may include typographical errors. Please contact the author for any clarifications.

## 2022-03-26 ENCOUNTER — HEALTH MAINTENANCE LETTER (OUTPATIENT)
Age: 62
End: 2022-03-26

## 2022-03-29 ENCOUNTER — PRE VISIT (OUTPATIENT)
Dept: PULMONOLOGY | Facility: CLINIC | Age: 62
End: 2022-03-29
Payer: COMMERCIAL

## 2022-05-21 ENCOUNTER — HEALTH MAINTENANCE LETTER (OUTPATIENT)
Age: 62
End: 2022-05-21

## 2022-09-17 ENCOUNTER — HEALTH MAINTENANCE LETTER (OUTPATIENT)
Age: 62
End: 2022-09-17

## 2023-02-06 ENCOUNTER — TELEPHONE (OUTPATIENT)
Dept: CARDIOLOGY | Facility: CLINIC | Age: 63
End: 2023-02-06
Payer: COMMERCIAL

## 2023-06-04 ENCOUNTER — HEALTH MAINTENANCE LETTER (OUTPATIENT)
Age: 63
End: 2023-06-04

## 2023-06-27 ENCOUNTER — TELEPHONE (OUTPATIENT)
Dept: CARDIOLOGY | Facility: CLINIC | Age: 63
End: 2023-06-27
Payer: COMMERCIAL

## 2023-06-27 ENCOUNTER — MYC MEDICAL ADVICE (OUTPATIENT)
Dept: CARDIOLOGY | Facility: CLINIC | Age: 63
End: 2023-06-27
Payer: COMMERCIAL

## 2023-06-27 NOTE — TELEPHONE ENCOUNTER
I had a medial branch block done today because of some neck, and head pain.   I am going to be having radiotherapy ablation done to help with my pain.   I have 2 questions.  1. Do I need to do anything different with my Xarelto?   2. When they did this procedure and I have noticed before. If I get shots or if they take blood. I don t bleed that much. Shouldn t I bleed more being on blood thinners?    Barb Hernandez

## 2023-06-30 NOTE — TELEPHONE ENCOUNTER
Called and spoke to patient re: Xarelto and upcoming radiotherapy ablation. Patient reports that her procedure team has not said anything about needing to hold Xarelto. This procedure will not be for another month. Reviewed that patient can hold Xarelto for 48hr prior (if required by procedure team) and restart as soon as hemostasis achieved. Patient verbalized understanding and is in agreement to the plan.    She has upcoming appointment with Ana Bella NP on 7/7/23 that she is requesting be changed to virtual. Changed visit mode.

## 2023-07-07 ENCOUNTER — VIRTUAL VISIT (OUTPATIENT)
Dept: CARDIOLOGY | Facility: CLINIC | Age: 63
End: 2023-07-07
Attending: NURSE PRACTITIONER
Payer: COMMERCIAL

## 2023-07-07 VITALS — WEIGHT: 225 LBS | BODY MASS INDEX: 32.75 KG/M2

## 2023-07-07 DIAGNOSIS — I48.0 PAF (PAROXYSMAL ATRIAL FIBRILLATION) (H): ICD-10-CM

## 2023-07-07 PROCEDURE — 99213 OFFICE O/P EST LOW 20 MIN: CPT | Mod: 95 | Performed by: NURSE PRACTITIONER

## 2023-07-07 RX ORDER — CHLORTHALIDONE 25 MG/1
1 TABLET ORAL DAILY
COMMUNITY
Start: 2023-01-17 | End: 2024-01-17

## 2023-07-07 RX ORDER — GABAPENTIN 300 MG/1
1 CAPSULE ORAL 3 TIMES DAILY
COMMUNITY
Start: 2023-04-24

## 2023-07-07 ASSESSMENT — PAIN SCALES - GENERAL: PAINLEVEL: MILD PAIN (3)

## 2023-07-07 NOTE — NURSING NOTE
Is the patient currently in the state of [place]? YES    Visit mode:VIDEO    If the visit is dropped, the patient can be reconnected by: VIDEO VISIT: Text to cell phone: [phone]    Will anyone else be joining the visit? NO      How would you like to obtain your AVS? MyChart    Are changes needed to the allergy or medication list? YES: Pt no longer taking Amlodipine, Pepcid, Hydrochlorothiazide     Reason for visit: RECHECK (No other vitals to report today, pt states last taken about two weeks ago and was good)    Edmundo Zamora, VF/CMA

## 2023-07-07 NOTE — PROGRESS NOTES
Electrophysiology Virtual Clinic Note  HPI:     Ms. Hernandez is a 63 year old female who has a past medical history significant for PAF (CHADSVASC 2 on Xarelto) s/p PVI 8/17/17 s/p PVI 2/6/18, HTN, HLD, anxiety, and depression.  She is here for follow up.     She was diagnosed with paroxysmal atrial fibrillation in 2015 in the setting of an acute illness but even after recovering she continued to have paroxysms of atrial fibrillation prompting emergency department visits, especially over the past year.  Her symptoms include palpitations, dyspnea, chest discomfort, and weakness.  She has never undergone electrical cardioversion.  She was evaluated at [facility] where she was started on flecainide 50 mg BID but she developed RVR during her stress test and, because the risk of flecainide toxicity could not be excluded, it was discontinued.  Rate control with metoprolol & diltiazem has been impeded by lightheadedness even to the point of ED visits. She was started on amiodarone 400 mg with the intention of performing ablative peripheral vein isolation but she has continued to have episodic AF.  She says that she once again presented to an emergency department 1.5 weeks ago with AF but converted to sinus rhythm after a few doses of metoprolol.  Then she saw one of our NPs, Narda Benavidez, to transfer her care to the [place] because she and her  have been pleased with her husbands medical care by the gastroenterology group here.  She was in AF in the 80s but had been symptomatic for the 4 days prior so she was scheduled to undergo DCCV.  When she presented, however, she was in sinus rhythm.     EP visit 11/22/17: Patient here post ablation in July.  Still feels that she is having her symptoms of palpitations and fatigue, but they are not nearly as severe.  Her toprol was increased to BID and losartan was decreased to 25mg.  Has not really noticed much change in her symptoms since that change.  She did  increase her losartan back to 50mg daily due to some higher blood pressures that are better controlled now.  No bleeding issues with pradaxa, recently had knee injections for arthritis. Does want to switch back to xarelto because she is having some nuisance bruising.Wants to come off amiodarone reading more about side affects.EKG here with sinus bradycardia, normal intervals.     EP Visit 5/2/18: Since her last visit, she developed recurrent symptomatic AF after her ablation. On 2/6/18 she was admitted for a repeat ablation. Since her second ablation, she has not had any recurrence of her symptoms. She has remained on Amiodarone 100 mg and her ZioPatch shows NO atrial fibrillation. Her LFT's and TFT's are normal as of today.  She has had two mechanical falls over the last 3 weeks, (tripped each time), and has some bruising on her lower extremities in the aftermath of these. No other bleeding complications on Xarelto.      EP visit 11/7/18: She had not experienced any recurrences a month ago. She had knee surgery a month ago and contacted us to discuss stopping the anticoagulation temporarily it went well. After this she got sick with the flu about a week and a half ago and with it she has experienced what she thinks is short episodes of arrhythmia lasting only about a minute the last of which was yesterday.  Otherwise remains on amiodarone. AST, ALT, TSH were normal on 5/2/18. PFT's were done in July showing reduced FEV1 and FVC with normal ratio and normal diffusing capacity suggestive possible mild restriction.She does endorse some SOB with exertion after her surgery. ECG today sows NSR with normal intervals  QRS 82ms.      EP Visit 9/18/2019: Due to her stability w/ afib, her amio was decreased to 100mg q every other day and then discontinued six months later. She returns today for follow up. Patient endorses palpitations every two days. Patient states that she also has SOB after climbing 2-3 flights of stairs.   EKG:NSR QTC:418 Current Medications: Losartan 25mg, Xarelto 20mg qday, Coreg 25mg qday.    EP Visit 12/18/2019: Returns today for follow up. Zio patch revealed 13 runs of SVT with longest and fastest run lasting 138BPM and 20.9 sec. TTE on 11/12/2019 revealed an EF of 55%. Patient denied lightheadedness, dizziness, chest pain and SOB. Current medications: Coreg 25mg and Xarelto 20mg.    EP Visit 7/28/21: She presents today for follow up. She reports feeling well from cardiac standpoint. She has had some psychosocial stressors recently that she is coping with. She denies any chest pain/pressures, dizziness, lightheadedness, worsening shortness of breath, leg/ankle swelling, PND, orthopnea, palpitations, or syncopal symptoms. Presenting 12 lead ECG shows NSR Vent Rate 61 bpm,  ms, QRS 82 ms, QTc 406 ms. Current cardiac medications include: Coreg, Norvasc, Losartan,  Pravastatin, and Xarelto.    She presents today for follow up. She reports feeling well. She has lost about 25lbs. BPs have been improved. No known AF. She denies chest discomfort, palpitations, abdominal fullness/bloating or peripheral edema, shortness of breath, paroxysmal nocturnal dyspnea, orthopnea, lightheadedness, dizziness, pre-syncope, or syncope. Current cardiac medications include: Coreg, Chlorthalidone, Hydrochlorothiazide, Losartan,  Pravastatin, and Xarelto.      PAST MEDICAL HISTORY:  Past Medical History:   Diagnosis Date     Antiplatelet or antithrombotic long-term use      Anxiety      Arrhythmia      Arthritis      Atrial fibrillation (H)      Constipation      Depression      Fibromuscular dysplasia (H)      Gastroesophageal reflux disease      HLD (hyperlipidemia)      HTN (hypertension)      Intraparenchymal renal artery disease (H)      Migraines      Palpitation      PVC's (premature ventricular contractions)        CURRENT MEDICATIONS:  Current Outpatient Medications   Medication Sig Dispense Refill     Acetaminophen (TYLENOL  PO) Take 650 mg by mouth every 4 hours as needed for mild pain or fever (for migraines)       amLODIPine (NORVASC) 5 MG tablet Take 1 tablet (5 mg) by mouth daily 90 tablet 6     carvedilol (COREG) 25 MG tablet Take 1 tablet (25 mg) by mouth 2 times daily (with meals) 180 tablet 6     estradiol (CLIMARA) 0.05 MG/24HR weekly patch Place 1 patch onto the skin once a week       famotidine (PEPCID) 20 MG tablet Take 20 mg by mouth 2 times daily       HYDROCHLOROTHIAZIDE PO Take 25 mg by mouth daily       losartan (COZAAR) 25 MG tablet Take 3 tablets (75 mg) by mouth daily 270 tablet 6     MELATONIN PO Take by mouth At Bedtime       PRAVASTATIN SODIUM PO Take 20 mg by mouth every evening        rivaroxaban ANTICOAGULANT (XARELTO ANTICOAGULANT) 20 MG TABS tablet Take 1 tablet (20 mg) by mouth daily (with dinner) 90 tablet 6     senna-docusate (SENOKOT-S;PERICOLACE) 8.6-50 MG per tablet Take 2 tablets by mouth every morning        venlafaxine (EFFEXOR-ER) 150 MG TB24 24 hr tablet Take 150 mg by mouth every evening        VITAMIN D, CHOLECALCIFEROL, PO Take 2,000 Units by mouth daily         PAST SURGICAL HISTORY:  Past Surgical History:   Procedure Laterality Date     AS PTA RENAL/VISCERAL ARTERY Right      C/SECTION, CLASSICAL        SECTION       FUSION CERVICAL ANTERIOR ONE LEVEL      c3-C4     H ABLATION FOCAL AFIB       HYSTERECTOMY       ORTHOPEDIC SURGERY      knee arthroscopy bilaterally      TONSILLECTOMY       TRANSESOPHAGEAL ECHOCARDIOGRAM INTRAOPERATIVE N/A 2018    Procedure: TRANSESOPHAGEAL ECHOCARDIOGRAM INTRAOPERATIVE;;  Surgeon: GENERIC ANESTHESIA PROVIDER;  Location:  OR       ALLERGIES:   No Known Allergies    FAMILY HISTORY:  Family History   Problem Relation Age of Onset     Cancer Mother      Asthma Mother      Heart Murmur Sister         prolapse     Depression Sister      Coronary Artery Disease Father        SOCIAL HISTORY:  Social History     Tobacco Use     Smoking status: Never      Smokeless tobacco: Never   Substance Use Topics     Alcohol use: Yes     Comment: rare      Drug use: No       ROS:   A comprehensive 10 point review of systems negative other than as mentioned in HPI.      Labs:  Reviewed.     Testing/Procedures:  PULMONARY FUNCTION TESTS:       Latest Ref Rng & Units 7/9/2018     7:02 AM   PFT   FVC L 2.93    FEV1 L 2.35    FVC% % 73    FEV1% % 75          ECHOCARDIOGRAM:     TTE 11/2019    CONCLUSION:    Sinus rhythm during study.     Normal LV size, thickness and systolic function, EF > 55%.     Normal RV size and function.     Mild LA dilatation.     Compared to report of study dated 3/16/2017, LA chamber appears     mildly dilated on present study.      Left Ventricular Ejection Fraction: 60 %       Assessment and Plan:   Ms. Hernandez is a 63 year old female who has a past medical history significant for PAF (CHADSVASC 2 on Xarelto) s/p PVI 8/17/17 s/p PVI 2/6/18, HTN, HLD, anxiety, and depression. She presents today for follow up.    Paroxsymal Atrial Fibrillation  Hypertension:  1. Stroke Prophylaxis:  CHADSVASC=+gender, +HTN  2, corresponding to a 2.2% annual stroke / systemic emolism event rate. indicating need for long term oral anticoagulation.  She is on Xarelto. No bleeding issues.   2. Rate Control: Continue Coreg.   3. Rhythm Control: Cardioversion, Antiarrhythmics and/or ablation are options for rhythm control. S/p PVI X2 last in 2/2018. Doing well without recurrences thus far.   4. Risk Factor Management: Statin, BP control- reports well controlled on home monitoring continue current antihypertensives, and BRENNAN evaluation as indicated.      Converted to telephone visit.   Follow up in 2 years or sooner if need arises.    Telephone Visit: 10 minutes.    I have reviewed the note as documented above.  This accurately captures the substance of my virtual visit with the patient. The patient states understanding and is agreeable with the plan.   Ana Bella, ADRIANNA  CNP  Electrophysiology Consult Service  Pager: [phone]

## 2023-07-07 NOTE — LETTER
7/7/2023      RE: Barb Hernandez  [address]  [place] [address]       Dear Colleague,    Thank you for the opportunity to participate in the care of your patient, Barb Hernandez, at the [facility] [place] [facility]. Please see a copy of my visit note below.    Electrophysiology Virtual Clinic Note  HPI:     Ms. Hernandez is a 63 year old female who has a past medical history significant for PAF (CHADSVASC 2 on Xarelto) s/p PVI 8/17/17 s/p PVI 2/6/18, HTN, HLD, anxiety, and depression.  She is here for follow up.     She was diagnosed with paroxysmal atrial fibrillation in 2015 in the setting of an acute illness but even after recovering she continued to have paroxysms of atrial fibrillation prompting emergency department visits, especially over the past year.  Her symptoms include palpitations, dyspnea, chest discomfort, and weakness.  She has never undergone electrical cardioversion.  She was evaluated at [facility] where she was started on flecainide 50 mg BID but she developed RVR during her stress test and, because the risk of flecainide toxicity could not be excluded, it was discontinued.  Rate control with metoprolol & diltiazem has been impeded by lightheadedness even to the point of ED visits. She was started on amiodarone 400 mg with the intention of performing ablative peripheral vein isolation but she has continued to have episodic AF.  She says that she once again presented to an emergency department 1.5 weeks ago with AF but converted to sinus rhythm after a few doses of metoprolol.  Then she saw one of our NPs, Narda Benavidez, to transfer her care to the [place] because she and her  have been pleased with her husbands medical care by the gastroenterology group here.  She was in AF in the 80s but had been symptomatic for the 4 days prior so she was scheduled to undergo DCCV.  When she presented, however, she was in  sinus rhythm.     EP visit 11/22/17: Patient here post ablation in July.  Still feels that she is having her symptoms of palpitations and fatigue, but they are not nearly as severe.  Her toprol was increased to BID and losartan was decreased to 25mg.  Has not really noticed much change in her symptoms since that change.  She did increase her losartan back to 50mg daily due to some higher blood pressures that are better controlled now.  No bleeding issues with pradaxa, recently had knee injections for arthritis. Does want to switch back to xarelto because she is having some nuisance bruising.Wants to come off amiodarone reading more about side affects.EKG here with sinus bradycardia, normal intervals.     EP Visit 5/2/18: Since her last visit, she developed recurrent symptomatic AF after her ablation. On 2/6/18 she was admitted for a repeat ablation. Since her second ablation, she has not had any recurrence of her symptoms. She has remained on Amiodarone 100 mg and her ZioPatch shows NO atrial fibrillation. Her LFT's and TFT's are normal as of today.  She has had two mechanical falls over the last 3 weeks, (tripped each time), and has some bruising on her lower extremities in the aftermath of these. No other bleeding complications on Xarelto.      EP visit 11/7/18: She had not experienced any recurrences a month ago. She had knee surgery a month ago and contacted us to discuss stopping the anticoagulation temporarily it went well. After this she got sick with the flu about a week and a half ago and with it she has experienced what she thinks is short episodes of arrhythmia lasting only about a minute the last of which was yesterday.  Otherwise remains on amiodarone. AST, ALT, TSH were normal on 5/2/18. PFT's were done in July showing reduced FEV1 and FVC with normal ratio and normal diffusing capacity suggestive possible mild restriction.She does endorse some SOB with exertion after her surgery. ECG today sows NSR  with normal intervals  QRS 82ms.      EP Visit 9/18/2019: Due to her stability w/ afib, her amio was decreased to 100mg q every other day and then discontinued six months later. She returns today for follow up. Patient endorses palpitations every two days. Patient states that she also has SOB after climbing 2-3 flights of stairs.  EKG:NSR QTC:418 Current Medications: Losartan 25mg, Xarelto 20mg qday, Coreg 25mg qday.    EP Visit 12/18/2019: Returns today for follow up. Zio patch revealed 13 runs of SVT with longest and fastest run lasting 138BPM and 20.9 sec. TTE on 11/12/2019 revealed an EF of 55%. Patient denied lightheadedness, dizziness, chest pain and SOB. Current medications: Coreg 25mg and Xarelto 20mg.    EP Visit 7/28/21: She presents today for follow up. She reports feeling well from cardiac standpoint. She has had some psychosocial stressors recently that she is coping with. She denies any chest pain/pressures, dizziness, lightheadedness, worsening shortness of breath, leg/ankle swelling, PND, orthopnea, palpitations, or syncopal symptoms. Presenting 12 lead ECG shows NSR Vent Rate 61 bpm,  ms, QRS 82 ms, QTc 406 ms. Current cardiac medications include: Coreg, Norvasc, Losartan,  Pravastatin, and Xarelto.    She presents today for follow up. She reports feeling well. She has lost about 25lbs. BPs have been improved. No known AF. She denies chest discomfort, palpitations, abdominal fullness/bloating or peripheral edema, shortness of breath, paroxysmal nocturnal dyspnea, orthopnea, lightheadedness, dizziness, pre-syncope, or syncope. Current cardiac medications include: Coreg, Chlorthalidone, Hydrochlorothiazide, Losartan,  Pravastatin, and Xarelto.      PAST MEDICAL HISTORY:  Past Medical History:   Diagnosis Date    Antiplatelet or antithrombotic long-term use     Anxiety     Arrhythmia     Arthritis     Atrial fibrillation (H)     Constipation     Depression     Fibromuscular dysplasia (H)      Gastroesophageal reflux disease     HLD (hyperlipidemia)     HTN (hypertension)     Intraparenchymal renal artery disease (H)     Migraines     Palpitation     PVC's (premature ventricular contractions)        CURRENT MEDICATIONS:  Current Outpatient Medications   Medication Sig Dispense Refill    Acetaminophen (TYLENOL PO) Take 650 mg by mouth every 4 hours as needed for mild pain or fever (for migraines)      amLODIPine (NORVASC) 5 MG tablet Take 1 tablet (5 mg) by mouth daily 90 tablet 6    carvedilol (COREG) 25 MG tablet Take 1 tablet (25 mg) by mouth 2 times daily (with meals) 180 tablet 6    estradiol (CLIMARA) 0.05 MG/24HR weekly patch Place 1 patch onto the skin once a week      famotidine (PEPCID) 20 MG tablet Take 20 mg by mouth 2 times daily      HYDROCHLOROTHIAZIDE PO Take 25 mg by mouth daily      losartan (COZAAR) 25 MG tablet Take 3 tablets (75 mg) by mouth daily 270 tablet 6    MELATONIN PO Take by mouth At Bedtime      PRAVASTATIN SODIUM PO Take 20 mg by mouth every evening       rivaroxaban ANTICOAGULANT (XARELTO ANTICOAGULANT) 20 MG TABS tablet Take 1 tablet (20 mg) by mouth daily (with dinner) 90 tablet 6    senna-docusate (SENOKOT-S;PERICOLACE) 8.6-50 MG per tablet Take 2 tablets by mouth every morning       venlafaxine (EFFEXOR-ER) 150 MG TB24 24 hr tablet Take 150 mg by mouth every evening       VITAMIN D, CHOLECALCIFEROL, PO Take 2,000 Units by mouth daily         PAST SURGICAL HISTORY:  Past Surgical History:   Procedure Laterality Date    AS PTA RENAL/VISCERAL ARTERY Right     C/SECTION, CLASSICAL       SECTION      FUSION CERVICAL ANTERIOR ONE LEVEL      c3-C4    H ABLATION FOCAL AFIB      HYSTERECTOMY      ORTHOPEDIC SURGERY      knee arthroscopy bilaterally     TONSILLECTOMY      TRANSESOPHAGEAL ECHOCARDIOGRAM INTRAOPERATIVE N/A 2018    Procedure: TRANSESOPHAGEAL ECHOCARDIOGRAM INTRAOPERATIVE;;  Surgeon: GENERIC ANESTHESIA PROVIDER;  Location: [facility] OR       ALLERGIES:   No  Known Allergies    FAMILY HISTORY:  Family History   Problem Relation Age of Onset    Cancer Mother     Asthma Mother     Heart Murmur Sister         prolapse    Depression Sister     Coronary Artery Disease Father        SOCIAL HISTORY:  Social History     Tobacco Use    Smoking status: Never    Smokeless tobacco: Never   Substance Use Topics    Alcohol use: Yes     Comment: rare     Drug use: No       ROS:   A comprehensive 10 point review of systems negative other than as mentioned in HPI.      Labs:  Reviewed.     Testing/Procedures:  PULMONARY FUNCTION TESTS:       Latest Ref Rng & Units 7/9/2018     7:02 AM   PFT   FVC L 2.93    FEV1 L 2.35    FVC% % 73    FEV1% % 75          ECHOCARDIOGRAM:     TTE 11/2019    CONCLUSION:    Sinus rhythm during study.     Normal LV size, thickness and systolic function, EF > 55%.     Normal RV size and function.     Mild LA dilatation.     Compared to report of study dated 3/16/2017, LA chamber appears     mildly dilated on present study.      Left Ventricular Ejection Fraction: 60 %       Assessment and Plan:   Ms. Hernandez is a 63 year old female who has a past medical history significant for PAF (CHADSVASC 2 on Xarelto) s/p PVI 8/17/17 s/p PVI 2/6/18, HTN, HLD, anxiety, and depression. She presents today for follow up.    Paroxsymal Atrial Fibrillation  Hypertension:  1. Stroke Prophylaxis:  CHADSVASC=+gender, +HTN  2, corresponding to a 2.2% annual stroke / systemic emolism event rate. indicating need for long term oral anticoagulation.  She is on Xarelto. No bleeding issues.   2. Rate Control: Continue Coreg.   3. Rhythm Control: Cardioversion, Antiarrhythmics and/or ablation are options for rhythm control. S/p PVI X2 last in 2/2018. Doing well without recurrences thus far.   4. Risk Factor Management: Statin, BP control- reports well controlled on home monitoring continue current antihypertensives, and BRENNAN evaluation as indicated.      Converted to telephone visit.    Follow up in 2 years or sooner if need arises.    Telephone Visit: 10 minutes.    I have reviewed the note as documented above.  This accurately captures the substance of my virtual visit with the patient. The patient states understanding and is agreeable with the plan.   ADRIANNA Rojas CNP  Electrophysiology Consult Service  Pager: [phone]

## 2024-02-01 ENCOUNTER — MEDICAL CORRESPONDENCE (OUTPATIENT)
Dept: HEALTH INFORMATION MANAGEMENT | Facility: CLINIC | Age: 64
End: 2024-02-01
Payer: COMMERCIAL

## 2024-02-02 ENCOUNTER — TRANSCRIBE ORDERS (OUTPATIENT)
Dept: OTHER | Age: 64
End: 2024-02-02

## 2024-02-02 DIAGNOSIS — M54.2 NECK PAIN: Primary | ICD-10-CM

## 2024-06-12 ENCOUNTER — OFFICE VISIT (OUTPATIENT)
Dept: NEUROLOGY | Facility: CLINIC | Age: 64
End: 2024-06-12
Attending: INTERNAL MEDICINE
Payer: COMMERCIAL

## 2024-06-12 VITALS
RESPIRATION RATE: 18 BRPM | SYSTOLIC BLOOD PRESSURE: 127 MMHG | BODY MASS INDEX: 32.02 KG/M2 | WEIGHT: 220 LBS | HEART RATE: 62 BPM | DIASTOLIC BLOOD PRESSURE: 87 MMHG

## 2024-06-12 DIAGNOSIS — M54.2 NECK PAIN: ICD-10-CM

## 2024-06-12 DIAGNOSIS — M54.81 OCCIPITAL NEURALGIA OF LEFT SIDE: Primary | ICD-10-CM

## 2024-06-12 DIAGNOSIS — R41.89 SUBJECTIVE MEMORY COMPLAINTS: ICD-10-CM

## 2024-06-12 PROCEDURE — 99245 OFF/OP CONSLTJ NEW/EST HI 55: CPT | Performed by: PSYCHIATRY & NEUROLOGY

## 2024-06-12 RX ORDER — TIZANIDINE 2 MG/1
2 TABLET ORAL 3 TIMES DAILY
Qty: 270 TABLET | Refills: 1 | Status: SHIPPED | OUTPATIENT
Start: 2024-06-12

## 2024-06-12 ASSESSMENT — MONTREAL COGNITIVE ASSESSMENT (MOCA)
7. [VIGILENCE] TAP WHEN HEARING DESIGNATED LETTER: 1
VISUOSPATIAL/EXECUTIVE SUBSCORE: 5
11. FOR EACH PAIR OF WORDS, WHAT CATEGORY DO THEY BELONG TO (OUT OF 2): 2
10. [FLUENCY] NAME WORDS STARTING WITH DESIGNATED LETTER: 1
4. NAME EACH OF THE THREE ANIMALS SHOWN: 3
WHAT IS THE TOTAL SCORE (OUT OF 30): 27
8. SERIAL SUBTRACTION OF 7S: 2
WHAT LEVEL OF EDUCATION WAS ATTAINED: 0
12. MEMORY INDEX SCORE: 3
13. ORIENTATION SUBSCORE: 6
6. READ LIST OF DIGITS [FORWARD/BACKWARD]: 2
9. REPEAT EACH SENTENCE: 2

## 2024-06-12 NOTE — PROGRESS NOTES
NEUROLOGY OUTPATIENT CONSULT NOTE   Jun 12, 2024     CHIEF COMPLAINT/REASON FOR VISIT/REASON FOR CONSULT  Patient presents with:  Neck Pain    REASON FOR CONSULTATION- Neck/head pain    REFERRAL SOURCE  Dr. Mindy Castañeda  CC Dr. Mindy Castañeda    HISTORY OF PRESENT ILLNESS  Barb Hernandez is a 64 year old female seen today for head/neck pain.  She 20 years ago had a history of cervical fusion.  In December 2022 around [place] she woke up with decreased range of motion her neck.  She was having severe left-sided neck pain radiating to the left occipital region.  She initially saw chiropractor with some benefit and then was referred to a pain clinic.  Did receive occipital nerve block which significantly helped with the symptoms.  She also had a ablation which made things worse.  Has had an MRI of the C/brain that have been noncontributory.  Still complains of some tightness in the neck.  Pain can be more throbbing in nature.  Touching the scalp can cause pain.  There is some stress going on but no injuries to the head that might have led to the headaches.  There was some pins-and-needles pain that went away with the ablation.  There is some photophobia with the headaches.  Rarely they are frontal.  No photophobia.  No visual auras.  Has tried Tylenol without benefit.  Is getting good sleep at night.    In terms of medications she initially tried gabapentin that was causing side effects and then she was switched to Lyrica which is helping though as soon as she runs out of the Lyrica the headache comes back.  Headache can be every day/all the time.    She also complains of memory problems.  Would like to be investigated further.  Her family is noticing things as well.    Previous history is reviewed and this is unchanged.    PAST MEDICAL/SURGICAL HISTORY  Past Medical History:   Diagnosis Date    Antiplatelet or antithrombotic long-term use     Anxiety     Arrhythmia     Arthritis     Atrial fibrillation  (H)     Constipation     Depression     Fibromuscular dysplasia (H24)     Gastroesophageal reflux disease     HLD (hyperlipidemia)     HTN (hypertension)     Intraparenchymal renal artery disease (H24)     Migraines     Palpitation     PVC's (premature ventricular contractions)      Patient Active Problem List   Diagnosis    S/P ablation of atrial fibrillation    Paroxysmal atrial fibrillation (H)    Benign essential hypertension    Fibromuscular dysplasia (H24)   Significant for high cholesterol, high blood pressure, migraines, arthritis    FAMILY HISTORY  Family History   Problem Relation Age of Onset    Cancer Mother     Asthma Mother     Heart Murmur Sister         prolapse    Depression Sister     Coronary Artery Disease Father    Negative for dementia though grandmother did have some memory issues.    SOCIAL HISTORY  Social History     Tobacco Use    Smoking status: Never    Smokeless tobacco: Never   Substance Use Topics    Alcohol use: Yes     Comment: rare     Drug use: No       SYSTEMS REVIEW  Twelve-system ROS was done and other than the HPI this was negative/positive for neck pain, back pain, joint pain, headaches, memory loss, anxiety, cardiac/heart problems.  History of atrial fibrillation    MEDICATIONS  Current Outpatient Medications   Medication Sig Dispense Refill    carvedilol (COREG) 25 MG tablet Take 1 tablet (25 mg) by mouth 2 times daily (with meals) 180 tablet 6    famotidine (PEPCID) 20 MG tablet Take 20 mg by mouth 2 times daily      losartan (COZAAR) 25 MG tablet Take 3 tablets (75 mg) by mouth daily 270 tablet 6    PRAVASTATIN SODIUM PO Take 20 mg by mouth every evening       rivaroxaban ANTICOAGULANT (XARELTO ANTICOAGULANT) 20 MG TABS tablet Take 1 tablet (20 mg) by mouth daily (with dinner) 90 tablet 6    tiZANidine (ZANAFLEX) 2 MG tablet Take 1 tablet (2 mg) by mouth 3 times daily 270 tablet 1    venlafaxine (EFFEXOR-ER) 150 MG TB24 24 hr tablet Take 150 mg by mouth every evening        VITAMIN D, CHOLECALCIFEROL, PO Take 2,000 Units by mouth daily      Acetaminophen (TYLENOL PO) Take 650 mg by mouth every 4 hours as needed for mild pain or fever (for migraines)      amLODIPine (NORVASC) 5 MG tablet Take 1 tablet (5 mg) by mouth daily (Patient not taking: Reported on 7/7/2023) 90 tablet 6    chlorthalidone (HYGROTON) 25 MG tablet Take 1 tablet by mouth daily      estradiol (CLIMARA) 0.05 MG/24HR weekly patch Place 1 patch onto the skin once a week      gabapentin (NEURONTIN) 300 MG capsule Take 1 capsule by mouth 3 times daily (Patient not taking: Reported on 6/12/2024)      HYDROCHLOROTHIAZIDE PO Take 25 mg by mouth daily (Patient not taking: Reported on 7/7/2023)      MELATONIN PO Take by mouth At Bedtime (Patient not taking: Reported on 6/12/2024)      senna-docusate (SENOKOT-S;PERICOLACE) 8.6-50 MG per tablet Take 2 tablets by mouth every morning (Patient not taking: Reported on 6/12/2024)       No current facility-administered medications for this visit.        PHYSICAL EXAMINATION  VITALS: /87   Pulse 62   Resp 18   Wt 99.8 kg (220 lb)   BMI 32.02 kg/m    GENERAL: Healthy appearing, alert, no acute distress, normal habitus.  CARDIOVASCULAR: Extremities warm and well perfused. Pulses present.   NEUROLOGICAL:  Patient is awake and oriented to self, place and time.  Attention span is normal.  Memory is grossly intact. MOCA 27.  Language is fluent and follows commands appropriately.  Appropriate fund of knowledge. Cranial nerves 2-12 are intact. There is no pronator drift.  Motor exam shows 5/5 strength in all extremities.  Tone is symmetric bilaterally in upper and lower extremities.  Reflexes are symmetric and 2+ in upper extremities and lower extremities. Sensory exam is grossly intact to light touch, pin prick and vibration.  Finger to nose and heel to shin is without dysmetria.  Romberg is negative.  Gait is normal and the patient is able to do tandem walk and walk on toes and  heels.  Palpation of the right occipital notch produces some pain (even though headaches on the left side)      DIAGNOSTICS  MRI C spine  IMPRESSION:   1. Mild degenerative cervical spondylosis with level by level analysis as described above without evidence of high-grade spinal canal or neural foraminal narrowing at any level.     HEAD MRA:   1.  Unremarkable head MRA.     NECK MRA:   1.  Unremarkable neck MRA.     HEAD MRI:   1.  No recent infarct, intracranial mass, abnormal enhancement or evidence of recent or remote intracranial hemorrhage.   2.  There are just a few scattered nonspecific foci of T2 signal hyperintensity in the supratentorial white matter which is not atypical in a patient of this age and of doubtful current clinical significance. These may reflect foci of nonspecific gliosis or chronic myelin loss, minimal chronic small vessel ischemic changes or sequelae of chronic headaches.     HEAD MRA:   1.  Unremarkable head MRA.     NECK MRA:   1.  Unremarkable neck MRA.     RELEVANT LABS              OUTSIDE RECORDS  Outside referral notes and chart notes were reviewed and pertinent information has been summarized (in addition to the HPI):-        IMPRESSION/REPORT/PLAN  Headaches/Neck pain-rule out occipital neuralgia with migraines versus cervicogenic headaches  Subjective memory complaints    This is a 64 year old female with history of cervical spine fusion with new onset of neck pain with decreased range of motion, left occipital headaches.  Differential for the headaches would include migraine headaches, occipital neuralgia versus neck pain/cervicogenic headaches.  She has had an occipital nerve block and good response suggesting occipital neuralgia.  Could be mixed headache type.    MRI brain/MRI C-spine has been negative for any significant degeneration.  To treat her symptoms we will put her on tizanidine as a muscle relaxant to help with the cervicogenic headaches.  Will plan for an occipital  nerve block.  Will do both sides since she does have some tenderness on the right side as well.  She is also on Lyrica for pain management which she can continue.  Will hold off on medications for migraines right now since that is less likely.  She does have a history of migraines.    For her memory problems this could be related to ongoing stress/ongoing pain.  Will check blood work to look for reversible causes of memory problems.  MRI brain has been noncontributory.  [place] today was 27.  Could consider neuropsychology in the future if needed.    I can see her back in a month for occipital nerve blocks.      -     tiZANidine (ZANAFLEX) 2 MG tablet; Take 1 tablet (2 mg) by mouth 3 times daily  -     Vitamin B12; Future  -     Vitamin B1 whole blood; Future  -     TSH with free T4 reflex; Future    Return in about 1 month (around 7/12/2024) for In-Clinic Visit (must), After testing, Add on PAOR.    Over 60 minutes were spent coordinating the care for the patient on the day of the encounter.  This includes previsit, during visit and post visit activities as documented above.  Counseling patient.  Multiple test/multiple problems reviewed.  (Activities include but not inclusive of reviewing chart, reviewing outside records, reviewing labs and imaging study results as well as the images, patient visit time including getting history and exam,  use if applicable, review of test results with the patient and coming up with a plan in a shared model, counseling patient and family, education and answering patient questions, EMR , EMR diagnosis entry and problem list management, medication reconciliation and prescription management if applicable, paperwork if applicable, printing documents and documentation of the visit activities.)        Eleazar Carlin MD  Neurologist  [facility] Neurology [facility]  Tel:- [phone]    This note was dictated using voice recognition software.  Any  grammatical or context distortions are unintentional and inherent to the software.

## 2024-06-14 ENCOUNTER — LAB (OUTPATIENT)
Dept: LAB | Facility: CLINIC | Age: 64
End: 2024-06-14
Payer: COMMERCIAL

## 2024-06-14 DIAGNOSIS — R41.89 SUBJECTIVE MEMORY COMPLAINTS: ICD-10-CM

## 2024-06-14 PROCEDURE — 82607 VITAMIN B-12: CPT

## 2024-06-14 PROCEDURE — 99000 SPECIMEN HANDLING OFFICE-LAB: CPT

## 2024-06-14 PROCEDURE — 84443 ASSAY THYROID STIM HORMONE: CPT

## 2024-06-14 PROCEDURE — 36415 COLL VENOUS BLD VENIPUNCTURE: CPT

## 2024-06-14 PROCEDURE — 84425 ASSAY OF VITAMIN B-1: CPT | Mod: 90

## 2024-06-15 LAB
TSH SERPL DL<=0.005 MIU/L-ACNC: 0.96 UIU/ML (ref 0.3–4.2)
VIT B12 SERPL-MCNC: 457 PG/ML (ref 232–1245)

## 2024-06-18 LAB — VIT B1 PYROPHOSHATE BLD-SCNC: 131 NMOL/L

## 2024-07-13 ENCOUNTER — HEALTH MAINTENANCE LETTER (OUTPATIENT)
Age: 64
End: 2024-07-13

## 2024-07-16 ENCOUNTER — OFFICE VISIT (OUTPATIENT)
Dept: NEUROLOGY | Facility: CLINIC | Age: 64
End: 2024-07-16
Payer: COMMERCIAL

## 2024-07-16 VITALS
SYSTOLIC BLOOD PRESSURE: 134 MMHG | DIASTOLIC BLOOD PRESSURE: 86 MMHG | HEART RATE: 66 BPM | BODY MASS INDEX: 32.44 KG/M2 | HEIGHT: 69 IN | WEIGHT: 219 LBS

## 2024-07-16 DIAGNOSIS — R41.89 SUBJECTIVE MEMORY COMPLAINTS: ICD-10-CM

## 2024-07-16 DIAGNOSIS — M54.81 OCCIPITAL NEURALGIA OF LEFT SIDE: ICD-10-CM

## 2024-07-16 DIAGNOSIS — M54.2 NECK PAIN: Primary | ICD-10-CM

## 2024-07-16 PROCEDURE — 99214 OFFICE O/P EST MOD 30 MIN: CPT | Mod: 25 | Performed by: PSYCHIATRY & NEUROLOGY

## 2024-07-16 PROCEDURE — 64405 NJX AA&/STRD GR OCPL NRV: CPT | Performed by: PSYCHIATRY & NEUROLOGY

## 2024-07-16 RX ORDER — BUPIVACAINE HYDROCHLORIDE 2.5 MG/ML
2 INJECTION, SOLUTION EPIDURAL; INFILTRATION; INTRACAUDAL ONCE
Status: COMPLETED | OUTPATIENT
Start: 2024-07-16 | End: 2024-07-16

## 2024-07-16 RX ORDER — PREGABALIN 75 MG/1
75 CAPSULE ORAL
COMMUNITY
Start: 2024-04-25 | End: 2025-04-25

## 2024-07-16 RX ADMIN — BUPIVACAINE HYDROCHLORIDE 5 MG: 2.5 INJECTION, SOLUTION EPIDURAL; INFILTRATION; INTRACAUDAL at 16:29

## 2024-07-16 NOTE — PROCEDURES
NEUROLOGY PROCEDURE NOTE  Jul 16, 2024      Occipital nerve block injection    CONSENT: The procedure was explained to the patient. The risks and benefits of the procedure and the alternatives were discussed with the patient. The patient was given an opportunity to ask any questions they had about the procedure. A verbal consent was obtained from the patient. A written consent is available in the chart.    PRE-PROCEDURE  Diagnosis: Left occipital neuralgia    EXAM  GENERAL: Healthy appearing, alert, no acute distress, normal habitus.  NEUROLOGICAL:  Patient is alert with fluent language.  Extraocular movements are intact.  Facial movements are present and symmetric.  No arm drift.    PROCEDURE SUMMARY:   The following areas were identified after palpating for landmarks and the overlying skin was cleansed with alcohol. These muscles were then injected using a 30 guage- half inch needle with the following doses of bupivacaine 0.25% preservative-free.     Left greater occipital notch 1.5 ml  Left lesser occipital notch 0.5 mL    Lot Number and Expiration: [id number]; 1/1/25    The patient tolerated the procedure without any immediate complaints and will call the Neurology clinic if she has any problems or side effects from the medication.  The patient noticed some improvement in the pain right after the injection.  The patient will follow up in the Neurology clinic as previously decided.      Eleazar Carlin MD  Neurologist  [facility] Neurology [facility]  [phone]

## 2024-07-16 NOTE — LETTER
7/16/2024      Barb Hernandez  [address]  [place] [address]      Dear Colleague,    Thank you for referring your patient, Barb Hernandez, to the [facility]UR[facility] [place]. Please see a copy of my visit note below.    NEUROLOGY OUTPATIENT PROGRESS NOTE   Jul 16, 2024     CHIEF COMPLAINT/REASON FOR VISIT/REASON FOR CONSULT  Patient presents with:  Nerve Block: Occipital nerve block    REASON FOR CONSULTATION- Neck/head pain    REFERRAL SOURCE  Dr. Mindy Castañeda  CC Dr. Mindy Castañeda    HISTORY OF PRESENT ILLNESS  Barb Hernandez is a 64 year old female seen today for head/neck pain.  She 20 years ago had a history of cervical fusion.  In December 2022 around [place] she woke up with decreased range of motion her neck.  She was having severe left-sided neck pain radiating to the left occipital region.  She initially saw chiropractor with some benefit and then was referred to a pain clinic.  Did receive occipital nerve block which significantly helped with the symptoms.  She also had a ablation which made things worse.  Has had an MRI of the C/brain that have been noncontributory.  Still complains of some tightness in the neck.  Pain can be more throbbing in nature.  Touching the scalp can cause pain.  There is some stress going on but no injuries to the head that might have led to the headaches.  There was some pins-and-needles pain that went away with the ablation.  There is some photophobia with the headaches.  Rarely they are frontal.  No photophobia.  No visual auras.  Has tried Tylenol without benefit.  Is getting good sleep at night.    In terms of medications she initially tried gabapentin that was causing side effects and then she was switched to Lyrica which is helping though as soon as she runs out of the Lyrica the headache comes back.  Headache can be every day/all the time.    She also complains of memory problems.  Would like to be investigated further.  Her family  is noticing things as well.    7/16/24  Patient returns today  1.  Continues to have the memory problems.  No significant change.  Might be related to lack of sleep.  2.  She is doing much better during the daytime with the tizanidine and Lyrica combination.  Nighttime when she lies down the pain does resurface.  Overall wants to stay on the medication and start with the occipital nerve block today.  3.  No other new concerns.  Wants to do a B12 supplementation as the B12 is slightly on the lower end.    Previous history is reviewed and this is unchanged.    PAST MEDICAL/SURGICAL HISTORY  Past Medical History:   Diagnosis Date     Antiplatelet or antithrombotic long-term use      Anxiety      Arrhythmia      Arthritis      Atrial fibrillation (H)      Constipation      Depression      Fibromuscular dysplasia (H24)      Gastroesophageal reflux disease      HLD (hyperlipidemia)      HTN (hypertension)      Intraparenchymal renal artery disease (H24)      Migraines      Palpitation      PVC's (premature ventricular contractions)      Patient Active Problem List   Diagnosis     S/P ablation of atrial fibrillation     Paroxysmal atrial fibrillation (H)     Benign essential hypertension     Fibromuscular dysplasia (H24)   Significant for high cholesterol, high blood pressure, migraines, arthritis    FAMILY HISTORY  Family History   Problem Relation Age of Onset     Cancer Mother      Asthma Mother      Heart Murmur Sister         prolapse     Depression Sister      Coronary Artery Disease Father    Negative for dementia though grandmother did have some memory issues.    SOCIAL HISTORY  Social History     Tobacco Use     Smoking status: Never     Smokeless tobacco: Never   Substance Use Topics     Alcohol use: Yes     Comment: rare      Drug use: No       SYSTEMS REVIEW  Twelve-system ROS was done and other than the HPI this was negative/positive for neck pain, back pain, joint pain, headaches, memory loss, anxiety,  "cardiac/heart problems.  History of atrial fibrillation    MEDICATIONS  Current Outpatient Medications   Medication Sig Dispense Refill     Acetaminophen (TYLENOL PO) Take 650 mg by mouth every 4 hours as needed for mild pain or fever (for migraines)       amLODIPine (NORVASC) 5 MG tablet Take 1 tablet (5 mg) by mouth daily 90 tablet 6     carvedilol (COREG) 25 MG tablet Take 1 tablet (25 mg) by mouth 2 times daily (with meals) 180 tablet 6     famotidine (PEPCID) 20 MG tablet Take 20 mg by mouth 2 times daily       HYDROCHLOROTHIAZIDE PO Take 25 mg by mouth daily       losartan (COZAAR) 25 MG tablet Take 3 tablets (75 mg) by mouth daily 270 tablet 6     MELATONIN PO Take by mouth at bedtime       PRAVASTATIN SODIUM PO Take 20 mg by mouth every evening        pregabalin (LYRICA) 75 MG capsule Take 75 mg by mouth       rivaroxaban ANTICOAGULANT (XARELTO ANTICOAGULANT) 20 MG TABS tablet Take 1 tablet (20 mg) by mouth daily (with dinner) 90 tablet 6     senna-docusate (SENOKOT-S;PERICOLACE) 8.6-50 MG per tablet Take 2 tablets by mouth every morning       tiZANidine (ZANAFLEX) 2 MG tablet Take 1 tablet (2 mg) by mouth 3 times daily 270 tablet 1     venlafaxine (EFFEXOR-ER) 150 MG TB24 24 hr tablet Take 150 mg by mouth every evening        VITAMIN D, CHOLECALCIFEROL, PO Take 2,000 Units by mouth daily       chlorthalidone (HYGROTON) 25 MG tablet Take 1 tablet by mouth daily       estradiol (CLIMARA) 0.05 MG/24HR weekly patch Place 1 patch onto the skin once a week       gabapentin (NEURONTIN) 300 MG capsule Take 1 capsule by mouth 3 times daily (Patient not taking: Reported on 7/16/2024)       No current facility-administered medications for this visit.        PHYSICAL EXAMINATION  VITALS: /86   Pulse 66   Ht 1.753 m (5' 9\")   Wt 99.3 kg (219 lb)   BMI 32.34 kg/m    GENERAL: Healthy appearing, alert, no acute distress, normal habitus.  CARDIOVASCULAR: Extremities warm and well perfused. Pulses present. "   NEUROLOGICAL:  Patient is awake and oriented to self, place and time.  Attention span is normal.  Memory is grossly intact. MOCA 27.  Language is fluent and follows commands appropriately.  Appropriate fund of knowledge. Cranial nerves 2-12 are intact. There is no pronator drift.  Motor exam shows 5/5 strength in all extremities.  Tone is symmetric bilaterally in upper and lower extremities.  Reflexes are symmetric and 2+ in upper extremities and lower extremities. Sensory exam is grossly intact to light touch, pin prick and vibration.  Finger to nose and heel to shin is without dysmetria.  Romberg is negative.  Gait is normal and the patient is able to do tandem walk and walk on toes and heels.  Palpation of the right occipital notch produces some pain (even though headaches on the left side)  Exam stable.    DIAGNOSTICS  MRI C spine  IMPRESSION:   1. Mild degenerative cervical spondylosis with level by level analysis as described above without evidence of high-grade spinal canal or neural foraminal narrowing at any level.     HEAD MRA:   1.  Unremarkable head MRA.     NECK MRA:   1.  Unremarkable neck MRA.     HEAD MRI:   1.  No recent infarct, intracranial mass, abnormal enhancement or evidence of recent or remote intracranial hemorrhage.   2.  There are just a few scattered nonspecific foci of T2 signal hyperintensity in the supratentorial white matter which is not atypical in a patient of this age and of doubtful current clinical significance. These may reflect foci of nonspecific gliosis or chronic myelin loss, minimal chronic small vessel ischemic changes or sequelae of chronic headaches.     HEAD MRA:   1.  Unremarkable head MRA.     NECK MRA:   1.  Unremarkable neck MRA.     RELEVANT LABS              OUTSIDE RECORDS  Outside referral notes and chart notes were reviewed and pertinent information has been summarized (in addition to the HPI):-        Component      Latest Ref Rng 6/14/2024  1:13 PM    Vitamin B12      232 - 1,245 pg/mL 457    Vitamin B1 Whole Blood Level      70 - 180 nmol/L 131    TSH      0.30 - 4.20 uIU/mL 0.96          IMPRESSION/REPORT/PLAN  Headaches/Neck pain-rule out occipital neuralgia with migraines versus cervicogenic headaches  Subjective memory complaints    This is a 64 year old female with history of cervical spine fusion with new onset of neck pain with decreased range of motion, left occipital headaches.  Differential for the headaches would include migraine headaches, occipital neuralgia versus neck pain/cervicogenic headaches.  She has had an occipital nerve block and good response suggesting occipital neuralgia.  Could be mixed headache type.    MRI brain/MRI C-spine has been negative for any significant degeneration.  Tizanidine for cervicogenic headaches has been helpful and will continue.  Plan to proceed with occipital nerve block.  Can continue Lyrica for pain management she is also helping.    Will hold off on medications for migraines right now since that is less likely.  She does have a history of migraines.  Encouraged keeping a log of her headaches.      For her memory problems this could be related to ongoing stress/ongoing pain.  Blood work is negative though she was to do a B12 supplementation.  MRI brain has been noncontributory.  [place] today was 27.  Could consider neuropsychology in the future if needed.  Wants to see if the memory improves with improvement in pain.    I can see her back in a month for occipital nerve blocks.      -     tiZANidine (ZANAFLEX) 2 MG tablet; Take 1 tablet (2 mg) by mouth 3 times daily  -   Continue Lyrica  - Occipital nerve block today-left only today.  Consider right sided she does have some tenderness on exam previously.    Return in about 3 months (around 10/16/2024) for In-Clinic Visit (must), Trigger point injections.    Over 30 minutes were spent coordinating the care for the patient on the day of the encounter.  This  includes previsit, during visit and post visit activities as documented above.  Counseling patient.  Reviewing chart.  Blood test reviewed.  Prescription management.  (Activities include but not inclusive of reviewing chart, reviewing outside records, reviewing labs and imaging study results as well as the images, patient visit time including getting history and exam,  use if applicable, review of test results with the patient and coming up with a plan in a shared model, counseling patient and family, education and answering patient questions, EMR , EMR diagnosis entry and problem list management, medication reconciliation and prescription management if applicable, paperwork if applicable, printing documents and documentation of the visit activities.)        Eleazar Carlin MD  Neurologist  [facility] Neurology [facility]  Tel:- [phone]    This note was dictated using voice recognition software.  Any grammatical or context distortions are unintentional and inherent to the software.      Again, thank you for allowing me to participate in the care of your patient.        Sincerely,        Eleazar Carlin MD

## 2024-07-16 NOTE — NURSING NOTE
Chief Complaint   Patient presents with    Nerve Block     Occipital nerve block     Ana Sulliavn on 7/16/2024 at 1:12 PM

## 2024-07-16 NOTE — PROGRESS NOTES
NEUROLOGY OUTPATIENT PROGRESS NOTE   Jul 16, 2024     CHIEF COMPLAINT/REASON FOR VISIT/REASON FOR CONSULT  Patient presents with:  Nerve Block: Occipital nerve block    REASON FOR CONSULTATION- Neck/head pain    REFERRAL SOURCE  Dr. Mindy Castañeda  CC Dr. Mindy Castañeda    HISTORY OF PRESENT ILLNESS  Barb Hernandez is a 64 year old female seen today for head/neck pain.  She 20 years ago had a history of cervical fusion.  In December 2022 around [place] she woke up with decreased range of motion her neck.  She was having severe left-sided neck pain radiating to the left occipital region.  She initially saw chiropractor with some benefit and then was referred to a pain clinic.  Did receive occipital nerve block which significantly helped with the symptoms.  She also had a ablation which made things worse.  Has had an MRI of the C/brain that have been noncontributory.  Still complains of some tightness in the neck.  Pain can be more throbbing in nature.  Touching the scalp can cause pain.  There is some stress going on but no injuries to the head that might have led to the headaches.  There was some pins-and-needles pain that went away with the ablation.  There is some photophobia with the headaches.  Rarely they are frontal.  No photophobia.  No visual auras.  Has tried Tylenol without benefit.  Is getting good sleep at night.    In terms of medications she initially tried gabapentin that was causing side effects and then she was switched to Lyrica which is helping though as soon as she runs out of the Lyrica the headache comes back.  Headache can be every day/all the time.    She also complains of memory problems.  Would like to be investigated further.  Her family is noticing things as well.    7/16/24  Patient returns today  1.  Continues to have the memory problems.  No significant change.  Might be related to lack of sleep.  2.  She is doing much better during the daytime with the tizanidine and Lyrica  combination.  Nighttime when she lies down the pain does resurface.  Overall wants to stay on the medication and start with the occipital nerve block today.  3.  No other new concerns.  Wants to do a B12 supplementation as the B12 is slightly on the lower end.    Previous history is reviewed and this is unchanged.    PAST MEDICAL/SURGICAL HISTORY  Past Medical History:   Diagnosis Date    Antiplatelet or antithrombotic long-term use     Anxiety     Arrhythmia     Arthritis     Atrial fibrillation (H)     Constipation     Depression     Fibromuscular dysplasia (H24)     Gastroesophageal reflux disease     HLD (hyperlipidemia)     HTN (hypertension)     Intraparenchymal renal artery disease (H24)     Migraines     Palpitation     PVC's (premature ventricular contractions)      Patient Active Problem List   Diagnosis    S/P ablation of atrial fibrillation    Paroxysmal atrial fibrillation (H)    Benign essential hypertension    Fibromuscular dysplasia (H24)   Significant for high cholesterol, high blood pressure, migraines, arthritis    FAMILY HISTORY  Family History   Problem Relation Age of Onset    Cancer Mother     Asthma Mother     Heart Murmur Sister         prolapse    Depression Sister     Coronary Artery Disease Father    Negative for dementia though grandmother did have some memory issues.    SOCIAL HISTORY  Social History     Tobacco Use    Smoking status: Never    Smokeless tobacco: Never   Substance Use Topics    Alcohol use: Yes     Comment: rare     Drug use: No       SYSTEMS REVIEW  Twelve-system ROS was done and other than the HPI this was negative/positive for neck pain, back pain, joint pain, headaches, memory loss, anxiety, cardiac/heart problems.  History of atrial fibrillation    MEDICATIONS  Current Outpatient Medications   Medication Sig Dispense Refill    Acetaminophen (TYLENOL PO) Take 650 mg by mouth every 4 hours as needed for mild pain or fever (for migraines)      amLODIPine (NORVASC) 5 MG  "tablet Take 1 tablet (5 mg) by mouth daily 90 tablet 6    carvedilol (COREG) 25 MG tablet Take 1 tablet (25 mg) by mouth 2 times daily (with meals) 180 tablet 6    famotidine (PEPCID) 20 MG tablet Take 20 mg by mouth 2 times daily      HYDROCHLOROTHIAZIDE PO Take 25 mg by mouth daily      losartan (COZAAR) 25 MG tablet Take 3 tablets (75 mg) by mouth daily 270 tablet 6    MELATONIN PO Take by mouth at bedtime      PRAVASTATIN SODIUM PO Take 20 mg by mouth every evening       pregabalin (LYRICA) 75 MG capsule Take 75 mg by mouth      rivaroxaban ANTICOAGULANT (XARELTO ANTICOAGULANT) 20 MG TABS tablet Take 1 tablet (20 mg) by mouth daily (with dinner) 90 tablet 6    senna-docusate (SENOKOT-S;PERICOLACE) 8.6-50 MG per tablet Take 2 tablets by mouth every morning      tiZANidine (ZANAFLEX) 2 MG tablet Take 1 tablet (2 mg) by mouth 3 times daily 270 tablet 1    venlafaxine (EFFEXOR-ER) 150 MG TB24 24 hr tablet Take 150 mg by mouth every evening       VITAMIN D, CHOLECALCIFEROL, PO Take 2,000 Units by mouth daily      chlorthalidone (HYGROTON) 25 MG tablet Take 1 tablet by mouth daily      estradiol (CLIMARA) 0.05 MG/24HR weekly patch Place 1 patch onto the skin once a week      gabapentin (NEURONTIN) 300 MG capsule Take 1 capsule by mouth 3 times daily (Patient not taking: Reported on 7/16/2024)       No current facility-administered medications for this visit.        PHYSICAL EXAMINATION  VITALS: /86   Pulse 66   Ht 1.753 m (5' 9\")   Wt 99.3 kg (219 lb)   BMI 32.34 kg/m    GENERAL: Healthy appearing, alert, no acute distress, normal habitus.  CARDIOVASCULAR: Extremities warm and well perfused. Pulses present.   NEUROLOGICAL:  Patient is awake and oriented to self, place and time.  Attention span is normal.  Memory is grossly intact. MOCA 27.  Language is fluent and follows commands appropriately.  Appropriate fund of knowledge. Cranial nerves 2-12 are intact. There is no pronator drift.  Motor exam shows 5/5 " strength in all extremities.  Tone is symmetric bilaterally in upper and lower extremities.  Reflexes are symmetric and 2+ in upper extremities and lower extremities. Sensory exam is grossly intact to light touch, pin prick and vibration.  Finger to nose and heel to shin is without dysmetria.  Romberg is negative.  Gait is normal and the patient is able to do tandem walk and walk on toes and heels.  Palpation of the right occipital notch produces some pain (even though headaches on the left side)  Exam stable.    DIAGNOSTICS  MRI C spine  IMPRESSION:   1. Mild degenerative cervical spondylosis with level by level analysis as described above without evidence of high-grade spinal canal or neural foraminal narrowing at any level.     HEAD MRA:   1.  Unremarkable head MRA.     NECK MRA:   1.  Unremarkable neck MRA.     HEAD MRI:   1.  No recent infarct, intracranial mass, abnormal enhancement or evidence of recent or remote intracranial hemorrhage.   2.  There are just a few scattered nonspecific foci of T2 signal hyperintensity in the supratentorial white matter which is not atypical in a patient of this age and of doubtful current clinical significance. These may reflect foci of nonspecific gliosis or chronic myelin loss, minimal chronic small vessel ischemic changes or sequelae of chronic headaches.     HEAD MRA:   1.  Unremarkable head MRA.     NECK MRA:   1.  Unremarkable neck MRA.     RELEVANT LABS              OUTSIDE RECORDS  Outside referral notes and chart notes were reviewed and pertinent information has been summarized (in addition to the HPI):-        Component      Latest Ref Rn 6/14/2024  1:13 PM   Vitamin B12      232 - 1,245 pg/mL 457    Vitamin B1 Whole Blood Level      70 - 180 nmol/L 131    TSH      0.30 - 4.20 uIU/mL 0.96          IMPRESSION/REPORT/PLAN  Headaches/Neck pain-rule out occipital neuralgia with migraines versus cervicogenic headaches  Subjective memory complaints    This is a 64 year  old female with history of cervical spine fusion with new onset of neck pain with decreased range of motion, left occipital headaches.  Differential for the headaches would include migraine headaches, occipital neuralgia versus neck pain/cervicogenic headaches.  She has had an occipital nerve block and good response suggesting occipital neuralgia.  Could be mixed headache type.    MRI brain/MRI C-spine has been negative for any significant degeneration.  Tizanidine for cervicogenic headaches has been helpful and will continue.  Plan to proceed with occipital nerve block.  Can continue Lyrica for pain management she is also helping.    Will hold off on medications for migraines right now since that is less likely.  She does have a history of migraines.  Encouraged keeping a log of her headaches.      For her memory problems this could be related to ongoing stress/ongoing pain.  Blood work is negative though she was to do a B12 supplementation.  MRI brain has been noncontributory.  [place] today was 27.  Could consider neuropsychology in the future if needed.  Wants to see if the memory improves with improvement in pain.    I can see her back in a month for occipital nerve blocks.      -     tiZANidine (ZANAFLEX) 2 MG tablet; Take 1 tablet (2 mg) by mouth 3 times daily  -   Continue Lyrica  - Occipital nerve block today-left only today.  Consider right sided she does have some tenderness on exam previously.    Return in about 3 months (around 10/16/2024) for In-Clinic Visit (must), Trigger point injections.    Over 30 minutes were spent coordinating the care for the patient on the day of the encounter.  This includes previsit, during visit and post visit activities as documented above.  Counseling patient.  Reviewing chart.  Blood test reviewed.  Prescription management.  (Activities include but not inclusive of reviewing chart, reviewing outside records, reviewing labs and imaging study results as well as the images,  patient visit time including getting history and exam,  use if applicable, review of test results with the patient and coming up with a plan in a shared model, counseling patient and family, education and answering patient questions, EMR , EMR diagnosis entry and problem list management, medication reconciliation and prescription management if applicable, paperwork if applicable, printing documents and documentation of the visit activities.)        Eleazar Carlin MD  Neurologist  [facility]  Tel:- [phone]    This note was dictated using voice recognition software.  Any grammatical or context distortions are unintentional and inherent to the software.

## 2024-07-29 ENCOUNTER — MYC MEDICAL ADVICE (OUTPATIENT)
Dept: NEUROLOGY | Facility: CLINIC | Age: 64
End: 2024-07-29
Payer: COMMERCIAL

## 2024-07-31 ENCOUNTER — MYC MEDICAL ADVICE (OUTPATIENT)
Dept: NEUROLOGY | Facility: CLINIC | Age: 64
End: 2024-07-31
Payer: COMMERCIAL

## 2024-07-31 DIAGNOSIS — M54.81 OCCIPITAL NEURALGIA OF LEFT SIDE: Primary | ICD-10-CM

## 2024-08-01 NOTE — TELEPHONE ENCOUNTER
Pt asking if you would be willing to prescribe alternative pain medication, she is having bruising with lyrica (you did not prescribe this, but in lov note you indicated that she should continue).     I did notify her that your response would come next week upon return to clinic.     Thank you  Oc Hook RN, BSN  [facility]

## 2024-08-05 RX ORDER — NORTRIPTYLINE HCL 10 MG
10 CAPSULE ORAL 3 TIMES DAILY
Qty: 180 CAPSULE | Refills: 0 | Status: SHIPPED | OUTPATIENT
Start: 2024-08-05 | End: 2024-09-30

## 2024-08-05 NOTE — TELEPHONE ENCOUNTER
We will have to try the nerve block 1 more time with a higher dose of medication.  Sometimes the first 1 does not work.

## 2024-08-05 NOTE — TELEPHONE ENCOUNTER
Pt has appt in Oct, would you do repeat block at that time or would you want her to come in sooner for repeat block? (You would have to work her into your schedule for sooner appt).     I did relay your message below to her.     Thank you  Oc Hook RN, BSN  [facility]

## 2024-09-30 DIAGNOSIS — M54.81 OCCIPITAL NEURALGIA OF LEFT SIDE: ICD-10-CM

## 2024-09-30 RX ORDER — NORTRIPTYLINE HCL 10 MG
CAPSULE ORAL
Qty: 180 CAPSULE | Refills: 0 | Status: SHIPPED | OUTPATIENT
Start: 2024-09-30

## 2024-09-30 NOTE — TELEPHONE ENCOUNTER
Refill request for: nortriptyline (PAMELOR) 10 MG capsule    Directions: Take 1 capsule (10 mg) by mouth 3 times daily     LOV: 7/16/24  NOV: 10/24/24    90 day supply with 0 refills Medication T'd for review and signature  Melissa Rodas CMA on 9/30/2024 at 1:58 PM  [facility]

## 2024-10-24 ENCOUNTER — OFFICE VISIT (OUTPATIENT)
Dept: NEUROLOGY | Facility: CLINIC | Age: 64
End: 2024-10-24
Payer: COMMERCIAL

## 2024-10-24 VITALS
SYSTOLIC BLOOD PRESSURE: 126 MMHG | BODY MASS INDEX: 32.44 KG/M2 | HEIGHT: 69 IN | HEART RATE: 66 BPM | DIASTOLIC BLOOD PRESSURE: 81 MMHG | WEIGHT: 219 LBS

## 2024-10-24 DIAGNOSIS — M54.81 OCCIPITAL NEURALGIA OF LEFT SIDE: ICD-10-CM

## 2024-10-24 DIAGNOSIS — R41.89 SUBJECTIVE MEMORY COMPLAINTS: Primary | ICD-10-CM

## 2024-10-24 DIAGNOSIS — M54.2 NECK PAIN: ICD-10-CM

## 2024-10-24 PROCEDURE — 20553 NJX 1/MLT TRIGGER POINTS 3/>: CPT | Mod: LT | Performed by: PSYCHIATRY & NEUROLOGY

## 2024-10-24 RX ORDER — TIZANIDINE 2 MG/1
2 TABLET ORAL 3 TIMES DAILY
Qty: 270 TABLET | Refills: 1 | Status: SHIPPED | OUTPATIENT
Start: 2024-10-24

## 2024-10-24 RX ORDER — NORTRIPTYLINE HYDROCHLORIDE 10 MG/1
10 CAPSULE ORAL 3 TIMES DAILY
Qty: 270 CAPSULE | Refills: 0 | Status: SHIPPED | OUTPATIENT
Start: 2024-10-24

## 2024-10-24 RX ORDER — BUPIVACAINE HYDROCHLORIDE 2.5 MG/ML
4.25 INJECTION, SOLUTION EPIDURAL; INFILTRATION; INTRACAUDAL ONCE
Status: COMPLETED | OUTPATIENT
Start: 2024-10-24 | End: 2024-10-24

## 2024-10-24 RX ADMIN — BUPIVACAINE HYDROCHLORIDE 10.75 MG: 2.5 INJECTION, SOLUTION EPIDURAL; INFILTRATION; INTRACAUDAL at 15:54

## 2024-10-24 NOTE — LETTER
10/24/2024      Barb Hernandez  [address]  [place] [address]      Dear Colleague,    Thank you for referring your patient, Barb Hernandez, to the [facility] [place]. Please see a copy of my visit note below.    NEUROLOGY OUTPATIENT PROGRESS NOTE   Oct 24, 2024     CHIEF COMPLAINT/REASON FOR VISIT/REASON FOR CONSULT  Patient presents with:  Follow Up: Trigger point injections. Patient states the last injection did not help.     REASON FOR CONSULTATION- Neck/head pain    REFERRAL SOURCE  Dr. Mindy Castañeda  CC Dr. Mindy Castañeda    HISTORY OF PRESENT ILLNESS  Barb Hernandez is a 64 year old female seen today for head/neck pain.  She 20 years ago had a history of cervical fusion.  In December 2022 around [place] she woke up with decreased range of motion her neck.  She was having severe left-sided neck pain radiating to the left occipital region.  She initially saw chiropractor with some benefit and then was referred to a pain clinic.  Did receive occipital nerve block which significantly helped with the symptoms.  She also had a ablation which made things worse.  Has had an MRI of the C/brain that have been noncontributory.  Still complains of some tightness in the neck.  Pain can be more throbbing in nature.  Touching the scalp can cause pain.  There is some stress going on but no injuries to the head that might have led to the headaches.  There was some pins-and-needles pain that went away with the ablation.  There is some photophobia with the headaches.  Rarely they are frontal.  No photophobia.  No visual auras.  Has tried Tylenol without benefit.  Is getting good sleep at night.    In terms of medications she initially tried gabapentin that was causing side effects and then she was switched to Lyrica which is helping though as soon as she runs out of the Lyrica the headache comes back.  Headache can be every day/all the time.    She also complains of memory problems.   Would like to be investigated further.  Her family is noticing things as well.    7/16/24  Patient returns today  1.  Continues to have the memory problems.  No significant change.  Might be related to lack of sleep.  2.  She is doing much better during the daytime with the tizanidine and Lyrica combination.  Nighttime when she lies down the pain does resurface.  Overall wants to stay on the medication and start with the occipital nerve block today.  3.  No other new concerns.  Wants to do a B12 supplementation as the B12 is slightly on the lower end.    10/24/24  Patient history  1.  She continues to have the pain that she was having before.  Generally starts in the left temporalis muscle and then radiates to her left occipital region and also down the neck to the shoulder.  She is taking nortriptyline 3 times a day which does help but not completely.  If she misses the dose the pain does come back.  Is also on tizanidine which is also helping.  Denies any other associate symptoms or any other provoking factors that she is identified.  Memory is about the same.  No change.  Previous occipital nerve block did not work and she is interested in trying a higher dose.  No side effects with the previous injections.    Previous history is reviewed and this is unchanged.    PAST MEDICAL/SURGICAL HISTORY  Past Medical History:   Diagnosis Date     Antiplatelet or antithrombotic long-term use      Anxiety      Arrhythmia      Arthritis      Atrial fibrillation (H)      Constipation      Depression      Fibromuscular dysplasia (H)      Gastroesophageal reflux disease      HLD (hyperlipidemia)      HTN (hypertension)      Intraparenchymal renal artery disease (H)      Migraines      Palpitation      PVC's (premature ventricular contractions)      Patient Active Problem List   Diagnosis     S/P ablation of atrial fibrillation     Paroxysmal atrial fibrillation (H)     Benign essential hypertension     Fibromuscular dysplasia  (H)   Significant for high cholesterol, high blood pressure, migraines, arthritis    FAMILY HISTORY  Family History   Problem Relation Age of Onset     Cancer Mother      Asthma Mother      Heart Murmur Sister         prolapse     Depression Sister      Coronary Artery Disease Father    Negative for dementia though grandmother did have some memory issues.    SOCIAL HISTORY  Social History     Tobacco Use     Smoking status: Never     Smokeless tobacco: Never   Substance Use Topics     Alcohol use: Yes     Comment: rare      Drug use: No       SYSTEMS REVIEW  Twelve-system ROS was done and other than the HPI this was negative/positive for neck pain, back pain, joint pain, headaches, memory loss, anxiety, cardiac/heart problems.  History of atrial fibrillation    MEDICATIONS  Current Outpatient Medications   Medication Sig Dispense Refill     Acetaminophen (TYLENOL PO) Take 650 mg by mouth every 4 hours as needed for mild pain or fever (for migraines)       carvedilol (COREG) 25 MG tablet Take 1 tablet (25 mg) by mouth 2 times daily (with meals) 180 tablet 6     chlorthalidone (HYGROTON) 25 MG tablet Take 1 tablet by mouth daily       Cyanocobalamin (VITAMIN B-12 PO) Take by mouth 2 times daily.       famotidine (PEPCID) 20 MG tablet Take 20 mg by mouth 2 times daily       losartan (COZAAR) 25 MG tablet Take 3 tablets (75 mg) by mouth daily 270 tablet 6     MELATONIN PO Take by mouth at bedtime       nortriptyline (PAMELOR) 10 MG capsule Take 1 capsule (10 mg) by mouth 3 times daily. 270 capsule 0     PRAVASTATIN SODIUM PO Take 20 mg by mouth every evening        rivaroxaban ANTICOAGULANT (XARELTO ANTICOAGULANT) 20 MG TABS tablet Take 1 tablet (20 mg) by mouth daily (with dinner) 90 tablet 6     senna-docusate (SENOKOT-S;PERICOLACE) 8.6-50 MG per tablet Take 2 tablets by mouth every morning       tiZANidine (ZANAFLEX) 2 MG tablet Take 1 tablet (2 mg) by mouth 3 times daily. 270 tablet 1     venlafaxine (EFFEXOR-ER)  "150 MG TB24 24 hr tablet Take 150 mg by mouth every evening        VITAMIN D, CHOLECALCIFEROL, PO Take 2,000 Units by mouth daily       amLODIPine (NORVASC) 5 MG tablet Take 1 tablet (5 mg) by mouth daily (Patient not taking: Reported on 10/24/2024) 90 tablet 6     HYDROCHLOROTHIAZIDE PO Take 25 mg by mouth daily (Patient not taking: Reported on 10/24/2024)       No current facility-administered medications for this visit.        PHYSICAL EXAMINATION  VITALS: /81 (BP Location: Right arm, Patient Position: Sitting)   Pulse 66   Ht 1.753 m (5' 9\")   Wt 99.3 kg (219 lb)   BMI 32.34 kg/m    GENERAL: Healthy appearing, alert, no acute distress, normal habitus.  CARDIOVASCULAR: Extremities warm and well perfused. Pulses present.   NEUROLOGICAL:  Patient is awake and oriented to self, place and time.  Attention span is normal.  Memory is grossly intact. MOCA 27.  Language is fluent and follows commands appropriately.  Appropriate fund of knowledge. Cranial nerves 2-12 are intact. There is no pronator drift.  Motor exam shows 5/5 strength in all extremities.  Tone is symmetric bilaterally in upper and lower extremities.  Reflexes are symmetric and 2+ in upper extremities and lower extremities. Sensory exam is grossly intact to light touch, pin prick and vibration.  Finger to nose and heel to shin is without dysmetria.  Romberg is negative.  Gait is normal and the patient is able to do tandem walk and walk on toes and heels.  Palpation of the right occipital notch produces some pain (even though headaches on the left side)  No change in exam.    DIAGNOSTICS  MRI C spine  IMPRESSION:   1. Mild degenerative cervical spondylosis with level by level analysis as described above without evidence of high-grade spinal canal or neural foraminal narrowing at any level.     HEAD MRA:   1.  Unremarkable head MRA.     NECK MRA:   1.  Unremarkable neck MRA.     HEAD MRI:   1.  No recent infarct, intracranial mass, abnormal " enhancement or evidence of recent or remote intracranial hemorrhage.   2.  There are just a few scattered nonspecific foci of T2 signal hyperintensity in the supratentorial white matter which is not atypical in a patient of this age and of doubtful current clinical significance. These may reflect foci of nonspecific gliosis or chronic myelin loss, minimal chronic small vessel ischemic changes or sequelae of chronic headaches.     HEAD MRA:   1.  Unremarkable head MRA.     NECK MRA:   1.  Unremarkable neck MRA.     RELEVANT LABS              OUTSIDE RECORDS  Outside referral notes and chart notes were reviewed and pertinent information has been summarized (in addition to the HPI):-        Component      Latest Ref Rng 6/14/2024  1:13 PM   Vitamin B12      232 - 1,245 pg/mL 457    Vitamin B1 Whole Blood Level      70 - 180 nmol/L 131    TSH      0.30 - 4.20 uIU/mL 0.96          IMPRESSION/REPORT/PLAN  Headaches/Neck pain-rule out occipital neuralgia with migraines versus cervicogenic headaches  Subjective memory complaints    This is a 64 year old female with history of cervical spine fusion with new onset of neck pain with decreased range of motion, left occipital headaches.  Differential for the headaches would include migraine headaches, occipital neuralgia versus neck pain/cervicogenic headaches.  She has had an occipital nerve block and good response suggesting occipital neuralgia.  Could be mixed headache type.  Her headaches are now spreading to the neck region and this is less likely to be suggestive of occipital neuralgia.    MRI brain/MRI C-spine has been negative for any significant degeneration.  Tizanidine for cervicogenic headaches has been helpful and will continue.  No dose of occipital nerve block was not helpful.  Will try a higher dose today.  She is now on nortriptyline for prevention/pain management.  Lyrica has not been stopped.    Will hold off on medications for migraines right now since that  is less likely.  She does have a history of migraines.  Encouraged keeping a log of her headaches.      For her memory problems this could be related to ongoing stress/ongoing pain.  Blood work is negative though she was to do a B12 supplementation.  MRI brain has been noncontributory.  [place] today was 27.  Could consider neuropsychology in the future if needed.  Wants to see if the memory improves with improvement in pain.  Stable.      -     nortriptyline (PAMELOR) 10 MG capsule; Take 1 capsule (10 mg) by mouth 3 times daily.  -     tiZANidine (ZANAFLEX) 2 MG tablet; Take 1 tablet (2 mg) by mouth 3 times daily.  - Occipital nerve block and trigger point injection    Return in about 3 months (around 1/24/2025) for In-Clinic Visit (must), Add on PAOR, Trigger point injections.    Over 31 minutes were spent coordinating the care for the patient on the day of the encounter.  This includes previsit, during visit and post visit activities as documented above.  Counseling patient.  Refractory problem.  Prescription management.  (Activities include but not inclusive of reviewing chart, reviewing outside records, reviewing labs and imaging study results as well as the images, patient visit time including getting history and exam,  use if applicable, review of test results with the patient and coming up with a plan in a shared model, counseling patient and family, education and answering patient questions, EMR , EMR diagnosis entry and problem list management, medication reconciliation and prescription management if applicable, paperwork if applicable, printing documents and documentation of the visit activities.)        Eleazar Carlin MD  Neurologist  [facility]  Tel:- [phone]    This note was dictated using voice recognition software.  Any grammatical or context distortions are unintentional and inherent to the software.      Again, thank you for allowing me to  participate in the care of your patient.        Sincerely,        Eleazar Carlin MD

## 2024-10-24 NOTE — NURSING NOTE
Chief Complaint   Patient presents with    Follow Up     Trigger point injections. Patient states the last injection did not help.      Eva Kimbrough MA

## 2024-10-24 NOTE — PROGRESS NOTES
NEUROLOGY OUTPATIENT PROGRESS NOTE   Oct 24, 2024     CHIEF COMPLAINT/REASON FOR VISIT/REASON FOR CONSULT  Patient presents with:  Follow Up: Trigger point injections. Patient states the last injection did not help.     REASON FOR CONSULTATION- Neck/head pain    REFERRAL SOURCE  Dr. Mindy Castañeda  CC Dr. Mindy Castañeda    HISTORY OF PRESENT ILLNESS  Barb Hernandez is a 64 year old female seen today for head/neck pain.  She 20 years ago had a history of cervical fusion.  In December 2022 around [place] she woke up with decreased range of motion her neck.  She was having severe left-sided neck pain radiating to the left occipital region.  She initially saw chiropractor with some benefit and then was referred to a pain clinic.  Did receive occipital nerve block which significantly helped with the symptoms.  She also had a ablation which made things worse.  Has had an MRI of the C/brain that have been noncontributory.  Still complains of some tightness in the neck.  Pain can be more throbbing in nature.  Touching the scalp can cause pain.  There is some stress going on but no injuries to the head that might have led to the headaches.  There was some pins-and-needles pain that went away with the ablation.  There is some photophobia with the headaches.  Rarely they are frontal.  No photophobia.  No visual auras.  Has tried Tylenol without benefit.  Is getting good sleep at night.    In terms of medications she initially tried gabapentin that was causing side effects and then she was switched to Lyrica which is helping though as soon as she runs out of the Lyrica the headache comes back.  Headache can be every day/all the time.    She also complains of memory problems.  Would like to be investigated further.  Her family is noticing things as well.    7/16/24  Patient returns today  1.  Continues to have the memory problems.  No significant change.  Might be related to lack of sleep.  2.  She is doing much  better during the daytime with the tizanidine and Lyrica combination.  Nighttime when she lies down the pain does resurface.  Overall wants to stay on the medication and start with the occipital nerve block today.  3.  No other new concerns.  Wants to do a B12 supplementation as the B12 is slightly on the lower end.    10/24/24  Patient history  1.  She continues to have the pain that she was having before.  Generally starts in the left temporalis muscle and then radiates to her left occipital region and also down the neck to the shoulder.  She is taking nortriptyline 3 times a day which does help but not completely.  If she misses the dose the pain does come back.  Is also on tizanidine which is also helping.  Denies any other associate symptoms or any other provoking factors that she is identified.  Memory is about the same.  No change.  Previous occipital nerve block did not work and she is interested in trying a higher dose.  No side effects with the previous injections.    Previous history is reviewed and this is unchanged.    PAST MEDICAL/SURGICAL HISTORY  Past Medical History:   Diagnosis Date    Antiplatelet or antithrombotic long-term use     Anxiety     Arrhythmia     Arthritis     Atrial fibrillation (H)     Constipation     Depression     Fibromuscular dysplasia (H)     Gastroesophageal reflux disease     HLD (hyperlipidemia)     HTN (hypertension)     Intraparenchymal renal artery disease (H)     Migraines     Palpitation     PVC's (premature ventricular contractions)      Patient Active Problem List   Diagnosis    S/P ablation of atrial fibrillation    Paroxysmal atrial fibrillation (H)    Benign essential hypertension    Fibromuscular dysplasia (H)   Significant for high cholesterol, high blood pressure, migraines, arthritis    FAMILY HISTORY  Family History   Problem Relation Age of Onset    Cancer Mother     Asthma Mother     Heart Murmur Sister         prolapse    Depression Sister     Coronary  Artery Disease Father    Negative for dementia though grandmother did have some memory issues.    SOCIAL HISTORY  Social History     Tobacco Use    Smoking status: Never    Smokeless tobacco: Never   Substance Use Topics    Alcohol use: Yes     Comment: rare     Drug use: No       SYSTEMS REVIEW  Twelve-system ROS was done and other than the HPI this was negative/positive for neck pain, back pain, joint pain, headaches, memory loss, anxiety, cardiac/heart problems.  History of atrial fibrillation    MEDICATIONS  Current Outpatient Medications   Medication Sig Dispense Refill    Acetaminophen (TYLENOL PO) Take 650 mg by mouth every 4 hours as needed for mild pain or fever (for migraines)      carvedilol (COREG) 25 MG tablet Take 1 tablet (25 mg) by mouth 2 times daily (with meals) 180 tablet 6    chlorthalidone (HYGROTON) 25 MG tablet Take 1 tablet by mouth daily      Cyanocobalamin (VITAMIN B-12 PO) Take by mouth 2 times daily.      famotidine (PEPCID) 20 MG tablet Take 20 mg by mouth 2 times daily      losartan (COZAAR) 25 MG tablet Take 3 tablets (75 mg) by mouth daily 270 tablet 6    MELATONIN PO Take by mouth at bedtime      nortriptyline (PAMELOR) 10 MG capsule Take 1 capsule (10 mg) by mouth 3 times daily. 270 capsule 0    PRAVASTATIN SODIUM PO Take 20 mg by mouth every evening       rivaroxaban ANTICOAGULANT (XARELTO ANTICOAGULANT) 20 MG TABS tablet Take 1 tablet (20 mg) by mouth daily (with dinner) 90 tablet 6    senna-docusate (SENOKOT-S;PERICOLACE) 8.6-50 MG per tablet Take 2 tablets by mouth every morning      tiZANidine (ZANAFLEX) 2 MG tablet Take 1 tablet (2 mg) by mouth 3 times daily. 270 tablet 1    venlafaxine (EFFEXOR-ER) 150 MG TB24 24 hr tablet Take 150 mg by mouth every evening       VITAMIN D, CHOLECALCIFEROL, PO Take 2,000 Units by mouth daily      amLODIPine (NORVASC) 5 MG tablet Take 1 tablet (5 mg) by mouth daily (Patient not taking: Reported on 10/24/2024) 90 tablet 6    HYDROCHLOROTHIAZIDE  "PO Take 25 mg by mouth daily (Patient not taking: Reported on 10/24/2024)       No current facility-administered medications for this visit.        PHYSICAL EXAMINATION  VITALS: /81 (BP Location: Right arm, Patient Position: Sitting)   Pulse 66   Ht 1.753 m (5' 9\")   Wt 99.3 kg (219 lb)   BMI 32.34 kg/m    GENERAL: Healthy appearing, alert, no acute distress, normal habitus.  CARDIOVASCULAR: Extremities warm and well perfused. Pulses present.   NEUROLOGICAL:  Patient is awake and oriented to self, place and time.  Attention span is normal.  Memory is grossly intact. MOCA 27.  Language is fluent and follows commands appropriately.  Appropriate fund of knowledge. Cranial nerves 2-12 are intact. There is no pronator drift.  Motor exam shows 5/5 strength in all extremities.  Tone is symmetric bilaterally in upper and lower extremities.  Reflexes are symmetric and 2+ in upper extremities and lower extremities. Sensory exam is grossly intact to light touch, pin prick and vibration.  Finger to nose and heel to shin is without dysmetria.  Romberg is negative.  Gait is normal and the patient is able to do tandem walk and walk on toes and heels.  Palpation of the right occipital notch produces some pain (even though headaches on the left side)  No change in exam.    DIAGNOSTICS  MRI C spine  IMPRESSION:   1. Mild degenerative cervical spondylosis with level by level analysis as described above without evidence of high-grade spinal canal or neural foraminal narrowing at any level.     HEAD MRA:   1.  Unremarkable head MRA.     NECK MRA:   1.  Unremarkable neck MRA.     HEAD MRI:   1.  No recent infarct, intracranial mass, abnormal enhancement or evidence of recent or remote intracranial hemorrhage.   2.  There are just a few scattered nonspecific foci of T2 signal hyperintensity in the supratentorial white matter which is not atypical in a patient of this age and of doubtful current clinical significance. These may " reflect foci of nonspecific gliosis or chronic myelin loss, minimal chronic small vessel ischemic changes or sequelae of chronic headaches.     HEAD MRA:   1.  Unremarkable head MRA.     NECK MRA:   1.  Unremarkable neck MRA.     RELEVANT LABS              OUTSIDE RECORDS  Outside referral notes and chart notes were reviewed and pertinent information has been summarized (in addition to the HPI):-        Component      Latest Ref Rng 6/14/2024  1:13 PM   Vitamin B12      232 - 1,245 pg/mL 457    Vitamin B1 Whole Blood Level      70 - 180 nmol/L 131    TSH      0.30 - 4.20 uIU/mL 0.96          IMPRESSION/REPORT/PLAN  Headaches/Neck pain-rule out occipital neuralgia with migraines versus cervicogenic headaches  Subjective memory complaints    This is a 64 year old female with history of cervical spine fusion with new onset of neck pain with decreased range of motion, left occipital headaches.  Differential for the headaches would include migraine headaches, occipital neuralgia versus neck pain/cervicogenic headaches.  She has had an occipital nerve block and good response suggesting occipital neuralgia.  Could be mixed headache type.  Her headaches are now spreading to the neck region and this is less likely to be suggestive of occipital neuralgia.    MRI brain/MRI C-spine has been negative for any significant degeneration.  Tizanidine for cervicogenic headaches has been helpful and will continue.  No dose of occipital nerve block was not helpful.  Will try a higher dose today.  She is now on nortriptyline for prevention/pain management.  Lyrica has not been stopped.    Will hold off on medications for migraines right now since that is less likely.  She does have a history of migraines.  Encouraged keeping a log of her headaches.      For her memory problems this could be related to ongoing stress/ongoing pain.  Blood work is negative though she was to do a B12 supplementation.  MRI brain has been noncontributory.   Accident today was 27.  Could consider neuropsychology in the future if needed.  Wants to see if the memory improves with improvement in pain.  Stable.      -     nortriptyline (PAMELOR) 10 MG capsule; Take 1 capsule (10 mg) by mouth 3 times daily.  -     tiZANidine (ZANAFLEX) 2 MG tablet; Take 1 tablet (2 mg) by mouth 3 times daily.  - Occipital nerve block and trigger point injection    Return in about 3 months (around 1/24/2025) for In-Clinic Visit (must), Add on PAOR, Trigger point injections.    Over 31 minutes were spent coordinating the care for the patient on the day of the encounter.  This includes previsit, during visit and post visit activities as documented above.  Counseling patient.  Refractory problem.  Prescription management.  (Activities include but not inclusive of reviewing chart, reviewing outside records, reviewing labs and imaging study results as well as the images, patient visit time including getting history and exam,  use if applicable, review of test results with the patient and coming up with a plan in a shared model, counseling patient and family, education and answering patient questions, EMR , EMR diagnosis entry and problem list management, medication reconciliation and prescription management if applicable, paperwork if applicable, printing documents and documentation of the visit activities.)        Eleazar Carlin MD  Neurologist  [facility]  Tel:- [phone]    This note was dictated using voice recognition software.  Any grammatical or context distortions are unintentional and inherent to the software.

## 2024-10-24 NOTE — NURSING NOTE
Chief Complaint   Patient presents with    Follow Up     Trigger point injections.      Eva Kimbrough MA

## 2024-10-24 NOTE — PROCEDURES
NEUROLOGY PROCEDURE NOTE  Jul 16, 2024      Occipital nerve block/Trigger point injection    CONSENT: The procedure was explained to the patient. The risks and benefits of the procedure and the alternatives were discussed with the patient. The patient was given an opportunity to ask any questions they had about the procedure. A verbal consent was obtained from the patient. A written consent is available in the chart.    PRE-PROCEDURE  Diagnosis: Left occipital neuralgia/Temporalis and neck pain    EXAM  GENERAL: Healthy appearing, alert, no acute distress, normal habitus.  NEUROLOGICAL:  Patient is alert with fluent language.  Extraocular movements are intact.  Facial movements are present and symmetric.  No arm drift.    PROCEDURE SUMMARY:   The following areas were identified after palpating for landmarks and the overlying skin was cleansed with alcohol. These muscles were then injected using a 30 guage- half inch needle with the following doses of bupivacaine 0.25% preservative-free.     Left greater occipital notch 2 ml  Left lesser occipital notch 0.75 mL  Left temporalis muscle 0.5 mL  Left longissimus muscle 0.5 mL  Left trapezius muscle 0.5 mL    Lot Number and Expiration: [id number]; 1/1/25    The patient tolerated the procedure without any immediate complaints and will call the Neurology clinic if she has any problems or side effects from the medication.  The patient noticed some improvement in the pain right after the injection.  The patient will follow up in the Neurology clinic as previously decided.      Eleazar Carlin MD  Neurologist  [facility] [place]  [phone]

## 2025-01-12 ENCOUNTER — HEALTH MAINTENANCE LETTER (OUTPATIENT)
Age: 65
End: 2025-01-12

## 2025-01-27 ENCOUNTER — OFFICE VISIT (OUTPATIENT)
Dept: NEUROLOGY | Facility: CLINIC | Age: 65
End: 2025-01-27
Payer: COMMERCIAL

## 2025-01-27 VITALS
DIASTOLIC BLOOD PRESSURE: 73 MMHG | HEART RATE: 61 BPM | WEIGHT: 236.4 LBS | BODY MASS INDEX: 34.91 KG/M2 | SYSTOLIC BLOOD PRESSURE: 128 MMHG

## 2025-01-27 DIAGNOSIS — M54.81 OCCIPITAL NEURALGIA OF LEFT SIDE: Primary | ICD-10-CM

## 2025-01-27 DIAGNOSIS — G56.22 ULNAR NEUROPATHY OF LEFT UPPER EXTREMITY: ICD-10-CM

## 2025-01-27 DIAGNOSIS — R41.89 SUBJECTIVE MEMORY COMPLAINTS: ICD-10-CM

## 2025-01-27 DIAGNOSIS — M54.2 NECK PAIN: ICD-10-CM

## 2025-01-27 PROCEDURE — 64405 NJX AA&/STRD GR OCPL NRV: CPT | Mod: 59 | Performed by: PSYCHIATRY & NEUROLOGY

## 2025-01-27 PROCEDURE — 20553 NJX 1/MLT TRIGGER POINTS 3/>: CPT | Performed by: PSYCHIATRY & NEUROLOGY

## 2025-01-27 PROCEDURE — 99214 OFFICE O/P EST MOD 30 MIN: CPT | Mod: 25 | Performed by: PSYCHIATRY & NEUROLOGY

## 2025-01-27 RX ORDER — NORTRIPTYLINE HYDROCHLORIDE 10 MG/1
10 CAPSULE ORAL 3 TIMES DAILY
Qty: 270 CAPSULE | Refills: 0 | Status: SHIPPED | OUTPATIENT
Start: 2025-01-27

## 2025-01-27 RX ORDER — TIZANIDINE 2 MG/1
2 TABLET ORAL 3 TIMES DAILY
Qty: 270 TABLET | Refills: 1 | Status: SHIPPED | OUTPATIENT
Start: 2025-01-27

## 2025-01-27 RX ORDER — BUPIVACAINE HYDROCHLORIDE 2.5 MG/ML
4.25 INJECTION, SOLUTION EPIDURAL; INFILTRATION; INTRACAUDAL ONCE
Status: COMPLETED | OUTPATIENT
Start: 2025-01-27 | End: 2025-01-27

## 2025-01-27 RX ORDER — BUPIVACAINE HYDROCHLORIDE 2.5 MG/ML
2 INJECTION, SOLUTION EPIDURAL; INFILTRATION; INTRACAUDAL ONCE
Status: DISCONTINUED | OUTPATIENT
Start: 2025-01-27 | End: 2025-01-27

## 2025-01-27 RX ADMIN — BUPIVACAINE HYDROCHLORIDE 10.75 MG: 2.5 INJECTION, SOLUTION EPIDURAL; INFILTRATION; INTRACAUDAL at 10:35

## 2025-01-27 NOTE — LETTER
1/27/2025      Barb Hernandez  [address]  [place] [address]      Dear Colleague,    Thank you for referring your patient, Barb Hernandez, to the [facility] [place]. Please see a copy of my visit note below.    NEUROLOGY OUTPATIENT PROGRESS NOTE   Jan 27, 2025     CHIEF COMPLAINT/REASON FOR VISIT/REASON FOR CONSULT  Patient presents with:  RECHECK: Trigger point injection.   Patient is just wondering if this going to be an ongoing procedure?    REASON FOR CONSULTATION- Neck/head pain    REFERRAL SOURCE  Dr. Mindy Castañeda  CC Dr. Mindy Castañeda    HISTORY OF PRESENT ILLNESS  Barb Hernandez is a 64 year old female seen today for head/neck pain.  She 20 years ago had a history of cervical fusion.  In December 2022 around [place] she woke up with decreased range of motion her neck.  She was having severe left-sided neck pain radiating to the left occipital region.  She initially saw chiropractor with some benefit and then was referred to a pain clinic.  Did receive occipital nerve block which significantly helped with the symptoms.  She also had a ablation which made things worse.  Has had an MRI of the C/brain that have been noncontributory.  Still complains of some tightness in the neck.  Pain can be more throbbing in nature.  Touching the scalp can cause pain.  There is some stress going on but no injuries to the head that might have led to the headaches.  There was some pins-and-needles pain that went away with the ablation.  There is some photophobia with the headaches.  Rarely they are frontal.  No photophobia.  No visual auras.  Has tried Tylenol without benefit.  Is getting good sleep at night.    In terms of medications she initially tried gabapentin that was causing side effects and then she was switched to Lyrica which is helping though as soon as she runs out of the Lyrica the headache comes back.  Headache can be every day/all the time.    She also complains of  memory problems.  Would like to be investigated further.  Her family is noticing things as well.    7/16/24  Patient returns today  1.  Continues to have the memory problems.  No significant change.  Might be related to lack of sleep.  2.  She is doing much better during the daytime with the tizanidine and Lyrica combination.  Nighttime when she lies down the pain does resurface.  Overall wants to stay on the medication and start with the occipital nerve block today.  3.  No other new concerns.  Wants to do a B12 supplementation as the B12 is slightly on the lower end.    10/24/24  Patient history  1.  She continues to have the pain that she was having before.  Generally starts in the left temporalis muscle and then radiates to her left occipital region and also down the neck to the shoulder.  She is taking nortriptyline 3 times a day which does help but not completely.  If she misses the dose the pain does come back.  Is also on tizanidine which is also helping.  Denies any other associate symptoms or any other provoking factors that she is identified.  Memory is about the same.  No change.  Previous occipital nerve block did not work and she is interested in trying a higher dose.  No side effects with the previous injections.    1/27/25  Patient returns today.  1.  After the injection the headaches had significantly improved.  Denies any side effects.  They have started wearing off in the last couple of days.  Nortriptyline and tizanidine were significantly beneficial as well.  She did try stopping one of the nortriptyline's and the headaches came back.  No side effects with the medications.  2.  Discussed prognosis of occipital neuralgia.  3.  Memory problems are about the same  4.  She does have some pain in her left elbow that is really tender on palpation with some pain radiating down the to the fingers.  Discussed about ulnar neuropathy.  Encouraged her to avoid pressure on the elbows.  Could consider EMG  No  other new concerns.    Previous history is reviewed and this is unchanged.    PAST MEDICAL/SURGICAL HISTORY  Past Medical History:   Diagnosis Date     Antiplatelet or antithrombotic long-term use      Anxiety      Arrhythmia      Arthritis      Atrial fibrillation (H)      Constipation      Depression      Fibromuscular dysplasia      Gastroesophageal reflux disease      HLD (hyperlipidemia)      HTN (hypertension)      Intraparenchymal renal artery disease      Migraines      Palpitation      PVC's (premature ventricular contractions)      Patient Active Problem List   Diagnosis     S/P ablation of atrial fibrillation     Paroxysmal atrial fibrillation (H)     Benign essential hypertension     Fibromuscular dysplasia   Significant for high cholesterol, high blood pressure, migraines, arthritis    FAMILY HISTORY  Family History   Problem Relation Age of Onset     Cancer Mother      Asthma Mother      Heart Murmur Sister         prolapse     Depression Sister      Coronary Artery Disease Father    Negative for dementia though grandmother did have some memory issues.    SOCIAL HISTORY  Social History     Tobacco Use     Smoking status: Never     Smokeless tobacco: Never   Substance Use Topics     Alcohol use: Yes     Comment: rare      Drug use: No       SYSTEMS REVIEW  Twelve-system ROS was done and other than the HPI this was negative/positive for neck pain, back pain, joint pain, headaches, memory loss, anxiety, cardiac/heart problems.  History of atrial fibrillation    MEDICATIONS  Current Outpatient Medications   Medication Sig Dispense Refill     Acetaminophen (TYLENOL PO) Take 650 mg by mouth every 4 hours as needed for mild pain or fever (for migraines)       carvedilol (COREG) 25 MG tablet Take 1 tablet (25 mg) by mouth 2 times daily (with meals) 180 tablet 6     Cyanocobalamin (VITAMIN B-12 PO) Take by mouth 2 times daily.       famotidine (PEPCID) 20 MG tablet Take 20 mg by mouth 2 times daily        losartan (COZAAR) 25 MG tablet Take 3 tablets (75 mg) by mouth daily 270 tablet 6     MELATONIN PO Take by mouth at bedtime       nortriptyline (PAMELOR) 10 MG capsule Take 1 capsule (10 mg) by mouth 3 times daily. 270 capsule 0     PRAVASTATIN SODIUM PO Take 20 mg by mouth every evening        rivaroxaban ANTICOAGULANT (XARELTO ANTICOAGULANT) 20 MG TABS tablet Take 1 tablet (20 mg) by mouth daily (with dinner) 90 tablet 6     senna-docusate (SENOKOT-S;PERICOLACE) 8.6-50 MG per tablet Take 2 tablets by mouth every morning       tiZANidine (ZANAFLEX) 2 MG tablet Take 1 tablet (2 mg) by mouth 3 times daily. 270 tablet 1     venlafaxine (EFFEXOR-ER) 150 MG TB24 24 hr tablet Take 150 mg by mouth every evening        VITAMIN D, CHOLECALCIFEROL, PO Take 2,000 Units by mouth daily       amLODIPine (NORVASC) 5 MG tablet Take 1 tablet (5 mg) by mouth daily (Patient not taking: Reported on 1/27/2025) 90 tablet 6     chlorthalidone (HYGROTON) 25 MG tablet Take 1 tablet by mouth daily       HYDROCHLOROTHIAZIDE PO Take 25 mg by mouth daily (Patient not taking: Reported on 1/27/2025)       No current facility-administered medications for this visit.        PHYSICAL EXAMINATION  VITALS: /73   Pulse 61   Wt 107.2 kg (236 lb 6.4 oz)   BMI 34.91 kg/m    GENERAL: Healthy appearing, alert, no acute distress, normal habitus.  CARDIOVASCULAR: Extremities warm and well perfused. Pulses present.   NEUROLOGICAL:  Patient is awake and oriented to self, place and time.  Attention span is normal.  Memory is grossly intact. MOCA 27.  Language is fluent and follows commands appropriately.  Appropriate fund of knowledge. Cranial nerves 2-12 are intact. There is no pronator drift.  Motor exam shows 5/5 strength in all extremities.  Tone is symmetric bilaterally in upper and lower extremities.  Reflexes are symmetric and 2+ in upper extremities and lower extremities. Sensory exam is grossly intact to light touch, pin prick and vibration.   Finger to nose and heel to shin is without dysmetria.  Romberg is negative.  Gait is normal and the patient is able to do tandem walk and walk on toes and heels.  Palpation of the right occipital notch produces some pain (even though headaches on the left side)  Exam stable.  Tenderness in the left elbow.    DIAGNOSTICS  MRI C spine  IMPRESSION:   1. Mild degenerative cervical spondylosis with level by level analysis as described above without evidence of high-grade spinal canal or neural foraminal narrowing at any level.     HEAD MRA:   1.  Unremarkable head MRA.     NECK MRA:   1.  Unremarkable neck MRA.     HEAD MRI:   1.  No recent infarct, intracranial mass, abnormal enhancement or evidence of recent or remote intracranial hemorrhage.   2.  There are just a few scattered nonspecific foci of T2 signal hyperintensity in the supratentorial white matter which is not atypical in a patient of this age and of doubtful current clinical significance. These may reflect foci of nonspecific gliosis or chronic myelin loss, minimal chronic small vessel ischemic changes or sequelae of chronic headaches.     HEAD MRA:   1.  Unremarkable head MRA.     NECK MRA:   1.  Unremarkable neck MRA.     RELEVANT LABS              OUTSIDE RECORDS  Outside referral notes and chart notes were reviewed and pertinent information has been summarized (in addition to the HPI):-        Component      Latest Ref Rng 6/14/2024  1:13 PM   Vitamin B12      232 - 1,245 pg/mL 457    Vitamin B1 Whole Blood Level      70 - 180 nmol/L 131    TSH      0.30 - 4.20 uIU/mL 0.96          IMPRESSION/REPORT/PLAN  Headaches/Neck pain-rule out occipital neuralgia with migraines versus cervicogenic headaches  Subjective memory complaints  Rule out left ulnar neuropathy.    This is a 64 year old female with history of cervical spine fusion with new onset of neck pain with decreased range of motion, left occipital headaches.  Differential for the headaches would  include migraine headaches, occipital neuralgia versus neck pain/cervicogenic headaches.  She has had an occipital nerve block and good response suggesting occipital neuralgia.  Could be mixed headache type.  Her headaches are now spreading to the neck region and this is less likely to be suggestive of occipital neuralgia.    MRI brain/MRI C-spine has been negative for any significant degeneration.  Tizanidine for cervicogenic headaches has been helpful and will continue.  Occipital nerve block/trigger point injections have significantly been helpful and will continue these every 3 months.  She is also on nortriptyline for prevention/pain management along with the tizanidine for neck pain.  Will continue these medications.  Lyrica has not been stopped.    There is some history of migraines and these could be migrainous in nature also.    For her memory problems this could be related to ongoing stress/ongoing pain.  Blood work is negative though she was to do a B12 supplementation.  MRI brain has been noncontributory.  [place] today was 27.  Could consider neuropsychology in the future if needed.  Wants to give it more time.  No change.    She complains of left elbow pain with some pain radiating down the arm.  Question of ulnar neuropathy.  Will have her avoid putting pressure on the left elbow and if things or not getting better could consider EMG during the next visit.    I can see her back in 3 months.  Medication refilled.      -     tiZANidine (ZANAFLEX) 2 MG tablet; Take 1 tablet (2 mg) by mouth 3 times daily.  -     nortriptyline (PAMELOR) 10 MG capsule; Take 1 capsule (10 mg) by mouth 3 times daily.  - Occipital nerve block and trigger point injection    Return in about 3 months (around 4/27/2025) for In-Clinic Visit (must), Add on PAOR, After testing.    Over 32 minutes were spent coordinating the care for the patient on the day of the encounter.  This includes previsit, during visit and post visit activities  as documented above.  Counseling patient.  Multiple problems reviewed.  New problem.  Prescription management.  (Activities include but not inclusive of reviewing chart, reviewing outside records, reviewing labs and imaging study results as well as the images, patient visit time including getting history and exam,  use if applicable, review of test results with the patient and coming up with a plan in a shared model, counseling patient and family, education and answering patient questions, EMR , EMR diagnosis entry and problem list management, medication reconciliation and prescription management if applicable, paperwork if applicable, printing documents and documentation of the visit activities.)        Eleazar Carlin MD  Neurologist  [facility] Neurology [facility]  Tel:- [phone]    This note was dictated using voice recognition software.  Any grammatical or context distortions are unintentional and inherent to the software.      Again, thank you for allowing me to participate in the care of your patient.        Sincerely,        Eleazar Carlin MD    Electronically signed

## 2025-01-27 NOTE — PROCEDURES
NEUROLOGY PROCEDURE NOTE  Jul 16, 2024      Occipital nerve block/Trigger point injection    CONSENT: The procedure was explained to the patient. The risks and benefits of the procedure and the alternatives were discussed with the patient. The patient was given an opportunity to ask any questions they had about the procedure. A verbal consent was obtained from the patient. A written consent is available in the chart.    PRE-PROCEDURE  Diagnosis: Left occipital neuralgia/Temporalis and neck pain    EXAM  GENERAL: Healthy appearing, alert, no acute distress, normal habitus.  NEUROLOGICAL:  Patient is alert with fluent language.  Extraocular movements are intact.  Facial movements are present and symmetric.  No arm drift.    PROCEDURE SUMMARY:   The following areas were identified after palpating for landmarks and the overlying skin was cleansed with alcohol. These muscles were then injected using a 30 guage- half inch needle with the following doses of bupivacaine 0.25% preservative-free.     Left greater occipital notch 2 ml  Left lesser occipital notch 0.75 mL  Left splenius capitis muscle 0.5 mL  Left longissimus muscle 0.5 mL  Left trapezius muscle 0.5 mL    Lot Number and Expiration: [id number]; 11/25    The patient tolerated the procedure without any immediate complaints and will call the Neurology clinic if she has any problems or side effects from the medication.  The patient noticed some improvement in the pain right after the injection.  The patient will follow up in the Neurology clinic as previously decided.      Eleazar Carlin MD  Neurologist  [facility]  [phone]

## 2025-01-27 NOTE — NURSING NOTE
"Barb Hernandez is a 64 year old female who presents for:  Chief Complaint   Patient presents with    RECHECK     Trigger point injection.   Patient is just wondering if this going to be an ongoing procedure?        Initial Vitals:  /73   Pulse 61   Wt 107.2 kg (236 lb 6.4 oz)   BMI 34.91 kg/m   Estimated body mass index is 34.91 kg/m  as calculated from the following:    Height as of 10/24/24: 1.753 m (5' 9\").    Weight as of this encounter: 107.2 kg (236 lb 6.4 oz).. Body surface area is 2.28 meters squared. BP completed using cuff size: wrist cuff    Palomo GISELLA Maxwell  "

## 2025-01-27 NOTE — PROGRESS NOTES
NEUROLOGY OUTPATIENT PROGRESS NOTE   Jan 27, 2025     CHIEF COMPLAINT/REASON FOR VISIT/REASON FOR CONSULT  Patient presents with:  RECHECK: Trigger point injection.   Patient is just wondering if this going to be an ongoing procedure?    REASON FOR CONSULTATION- Neck/head pain    REFERRAL SOURCE  Dr. Mindy Castañeda  CC Dr. Mindy Castañeda    HISTORY OF PRESENT ILLNESS  Barb Hernandez is a 64 year old female seen today for head/neck pain.  She 20 years ago had a history of cervical fusion.  In December 2022 around [place] she woke up with decreased range of motion her neck.  She was having severe left-sided neck pain radiating to the left occipital region.  She initially saw chiropractor with some benefit and then was referred to a pain clinic.  Did receive occipital nerve block which significantly helped with the symptoms.  She also had a ablation which made things worse.  Has had an MRI of the C/brain that have been noncontributory.  Still complains of some tightness in the neck.  Pain can be more throbbing in nature.  Touching the scalp can cause pain.  There is some stress going on but no injuries to the head that might have led to the headaches.  There was some pins-and-needles pain that went away with the ablation.  There is some photophobia with the headaches.  Rarely they are frontal.  No photophobia.  No visual auras.  Has tried Tylenol without benefit.  Is getting good sleep at night.    In terms of medications she initially tried gabapentin that was causing side effects and then she was switched to Lyrica which is helping though as soon as she runs out of the Lyrica the headache comes back.  Headache can be every day/all the time.    She also complains of memory problems.  Would like to be investigated further.  Her family is noticing things as well.    7/16/24  Patient returns today  1.  Continues to have the memory problems.  No significant change.  Might be related to lack of sleep.  2.  She  is doing much better during the daytime with the tizanidine and Lyrica combination.  Nighttime when she lies down the pain does resurface.  Overall wants to stay on the medication and start with the occipital nerve block today.  3.  No other new concerns.  Wants to do a B12 supplementation as the B12 is slightly on the lower end.    10/24/24  Patient history  1.  She continues to have the pain that she was having before.  Generally starts in the left temporalis muscle and then radiates to her left occipital region and also down the neck to the shoulder.  She is taking nortriptyline 3 times a day which does help but not completely.  If she misses the dose the pain does come back.  Is also on tizanidine which is also helping.  Denies any other associate symptoms or any other provoking factors that she is identified.  Memory is about the same.  No change.  Previous occipital nerve block did not work and she is interested in trying a higher dose.  No side effects with the previous injections.    1/27/25  Patient returns today.  1.  After the injection the headaches had significantly improved.  Denies any side effects.  They have started wearing off in the last couple of days.  Nortriptyline and tizanidine were significantly beneficial as well.  She did try stopping one of the nortriptyline's and the headaches came back.  No side effects with the medications.  2.  Discussed prognosis of occipital neuralgia.  3.  Memory problems are about the same  4.  She does have some pain in her left elbow that is really tender on palpation with some pain radiating down the to the fingers.  Discussed about ulnar neuropathy.  Encouraged her to avoid pressure on the elbows.  Could consider EMG  No other new concerns.    Previous history is reviewed and this is unchanged.    PAST MEDICAL/SURGICAL HISTORY  Past Medical History:   Diagnosis Date    Antiplatelet or antithrombotic long-term use     Anxiety     Arrhythmia     Arthritis      Atrial fibrillation (H)     Constipation     Depression     Fibromuscular dysplasia     Gastroesophageal reflux disease     HLD (hyperlipidemia)     HTN (hypertension)     Intraparenchymal renal artery disease     Migraines     Palpitation     PVC's (premature ventricular contractions)      Patient Active Problem List   Diagnosis    S/P ablation of atrial fibrillation    Paroxysmal atrial fibrillation (H)    Benign essential hypertension    Fibromuscular dysplasia   Significant for high cholesterol, high blood pressure, migraines, arthritis    FAMILY HISTORY  Family History   Problem Relation Age of Onset    Cancer Mother     Asthma Mother     Heart Murmur Sister         prolapse    Depression Sister     Coronary Artery Disease Father    Negative for dementia though grandmother did have some memory issues.    SOCIAL HISTORY  Social History     Tobacco Use    Smoking status: Never    Smokeless tobacco: Never   Substance Use Topics    Alcohol use: Yes     Comment: rare     Drug use: No       SYSTEMS REVIEW  Twelve-system ROS was done and other than the HPI this was negative/positive for neck pain, back pain, joint pain, headaches, memory loss, anxiety, cardiac/heart problems.  History of atrial fibrillation    MEDICATIONS  Current Outpatient Medications   Medication Sig Dispense Refill    Acetaminophen (TYLENOL PO) Take 650 mg by mouth every 4 hours as needed for mild pain or fever (for migraines)      carvedilol (COREG) 25 MG tablet Take 1 tablet (25 mg) by mouth 2 times daily (with meals) 180 tablet 6    Cyanocobalamin (VITAMIN B-12 PO) Take by mouth 2 times daily.      famotidine (PEPCID) 20 MG tablet Take 20 mg by mouth 2 times daily      losartan (COZAAR) 25 MG tablet Take 3 tablets (75 mg) by mouth daily 270 tablet 6    MELATONIN PO Take by mouth at bedtime      nortriptyline (PAMELOR) 10 MG capsule Take 1 capsule (10 mg) by mouth 3 times daily. 270 capsule 0    PRAVASTATIN SODIUM PO Take 20 mg by mouth every  evening       rivaroxaban ANTICOAGULANT (XARELTO ANTICOAGULANT) 20 MG TABS tablet Take 1 tablet (20 mg) by mouth daily (with dinner) 90 tablet 6    senna-docusate (SENOKOT-S;PERICOLACE) 8.6-50 MG per tablet Take 2 tablets by mouth every morning      tiZANidine (ZANAFLEX) 2 MG tablet Take 1 tablet (2 mg) by mouth 3 times daily. 270 tablet 1    venlafaxine (EFFEXOR-ER) 150 MG TB24 24 hr tablet Take 150 mg by mouth every evening       VITAMIN D, CHOLECALCIFEROL, PO Take 2,000 Units by mouth daily      amLODIPine (NORVASC) 5 MG tablet Take 1 tablet (5 mg) by mouth daily (Patient not taking: Reported on 1/27/2025) 90 tablet 6    chlorthalidone (HYGROTON) 25 MG tablet Take 1 tablet by mouth daily      HYDROCHLOROTHIAZIDE PO Take 25 mg by mouth daily (Patient not taking: Reported on 1/27/2025)       No current facility-administered medications for this visit.        PHYSICAL EXAMINATION  VITALS: /73   Pulse 61   Wt 107.2 kg (236 lb 6.4 oz)   BMI 34.91 kg/m    GENERAL: Healthy appearing, alert, no acute distress, normal habitus.  CARDIOVASCULAR: Extremities warm and well perfused. Pulses present.   NEUROLOGICAL:  Patient is awake and oriented to self, place and time.  Attention span is normal.  Memory is grossly intact. MOCA 27.  Language is fluent and follows commands appropriately.  Appropriate fund of knowledge. Cranial nerves 2-12 are intact. There is no pronator drift.  Motor exam shows 5/5 strength in all extremities.  Tone is symmetric bilaterally in upper and lower extremities.  Reflexes are symmetric and 2+ in upper extremities and lower extremities. Sensory exam is grossly intact to light touch, pin prick and vibration.  Finger to nose and heel to shin is without dysmetria.  Romberg is negative.  Gait is normal and the patient is able to do tandem walk and walk on toes and heels.  Palpation of the right occipital notch produces some pain (even though headaches on the left side)  Exam stable.  Tenderness  in the left elbow.    DIAGNOSTICS  MRI C spine  IMPRESSION:   1. Mild degenerative cervical spondylosis with level by level analysis as described above without evidence of high-grade spinal canal or neural foraminal narrowing at any level.     HEAD MRA:   1.  Unremarkable head MRA.     NECK MRA:   1.  Unremarkable neck MRA.     HEAD MRI:   1.  No recent infarct, intracranial mass, abnormal enhancement or evidence of recent or remote intracranial hemorrhage.   2.  There are just a few scattered nonspecific foci of T2 signal hyperintensity in the supratentorial white matter which is not atypical in a patient of this age and of doubtful current clinical significance. These may reflect foci of nonspecific gliosis or chronic myelin loss, minimal chronic small vessel ischemic changes or sequelae of chronic headaches.     HEAD MRA:   1.  Unremarkable head MRA.     NECK MRA:   1.  Unremarkable neck MRA.     RELEVANT LABS              OUTSIDE RECORDS  Outside referral notes and chart notes were reviewed and pertinent information has been summarized (in addition to the HPI):-        Component      Latest Ref Rng 6/14/2024  1:13 PM   Vitamin B12      232 - 1,245 pg/mL 457    Vitamin B1 Whole Blood Level      70 - 180 nmol/L 131    TSH      0.30 - 4.20 uIU/mL 0.96          IMPRESSION/REPORT/PLAN  Headaches/Neck pain-rule out occipital neuralgia with migraines versus cervicogenic headaches  Subjective memory complaints  Rule out left ulnar neuropathy.    This is a 64 year old female with history of cervical spine fusion with new onset of neck pain with decreased range of motion, left occipital headaches.  Differential for the headaches would include migraine headaches, occipital neuralgia versus neck pain/cervicogenic headaches.  She has had an occipital nerve block and good response suggesting occipital neuralgia.  Could be mixed headache type.  Her headaches are now spreading to the neck region and this is less likely to be  suggestive of occipital neuralgia.    MRI brain/MRI C-spine has been negative for any significant degeneration.  Tizanidine for cervicogenic headaches has been helpful and will continue.  Occipital nerve block/trigger point injections have significantly been helpful and will continue these every 3 months.  She is also on nortriptyline for prevention/pain management along with the tizanidine for neck pain.  Will continue these medications.  Lyrica has not been stopped.    There is some history of migraines and these could be migrainous in nature also.    For her memory problems this could be related to ongoing stress/ongoing pain.  Blood work is negative though she was to do a B12 supplementation.  MRI brain has been noncontributory.  King today was 27.  Could consider neuropsychology in the future if needed.  Wants to give it more time.  No change.    She complains of left elbow pain with some pain radiating down the arm.  Question of ulnar neuropathy.  Will have her avoid putting pressure on the left elbow and if things or not getting better could consider EMG during the next visit.    I can see her back in 3 months.  Medication refilled.      -     tiZANidine (ZANAFLEX) 2 MG tablet; Take 1 tablet (2 mg) by mouth 3 times daily.  -     nortriptyline (PAMELOR) 10 MG capsule; Take 1 capsule (10 mg) by mouth 3 times daily.  - Occipital nerve block and trigger point injection    Return in about 3 months (around 4/27/2025) for In-Clinic Visit (must), Add on PAOR, After testing.    Over 32 minutes were spent coordinating the care for the patient on the day of the encounter.  This includes previsit, during visit and post visit activities as documented above.  Counseling patient.  Multiple problems reviewed.  New problem.  Prescription management.  (Activities include but not inclusive of reviewing chart, reviewing outside records, reviewing labs and imaging study results as well as the images, patient visit time including  getting history and exam,  use if applicable, review of test results with the patient and coming up with a plan in a shared model, counseling patient and family, education and answering patient questions, EMR , EMR diagnosis entry and problem list management, medication reconciliation and prescription management if applicable, paperwork if applicable, printing documents and documentation of the visit activities.)        Eleazar Carlin MD  Neurologist  [facility] Neurology [facility]  Tel:- [phone]    This note was dictated using voice recognition software.  Any grammatical or context distortions are unintentional and inherent to the software.

## 2025-03-04 ENCOUNTER — TELEPHONE (OUTPATIENT)
Dept: CARDIOLOGY | Facility: CLINIC | Age: 65
End: 2025-03-04
Payer: COMMERCIAL

## 2025-03-04 NOTE — TELEPHONE ENCOUNTER
Left Voicemail (1st Attempt) and Sent Mychart (1st Attempt) for the patient to call back and schedule the following:    Appointment type: RTN EP  Provider: JESSI ROSAS OR CAMILLA  Return date: 7/7/2025  Specialty phone number: [phone] OPT 1  Additional appointment(s) needed: N/A  Additonal Notes: OKAY TO SCHEDULE WITH ANY EP ISRA

## 2025-03-06 NOTE — TELEPHONE ENCOUNTER
Patient Contacted for the patient to call back and schedule the following:    Appointment type: RTN EP  Provider: JESSI  Return date: 7/25/2025  Specialty phone number: [phone] OPT 1  Additional appointment(s) needed: N/A  Additonal Notes: N/A

## 2025-04-28 ENCOUNTER — OFFICE VISIT (OUTPATIENT)
Dept: NEUROLOGY | Facility: CLINIC | Age: 65
End: 2025-04-28
Payer: COMMERCIAL

## 2025-04-28 VITALS
HEIGHT: 69 IN | HEART RATE: 66 BPM | SYSTOLIC BLOOD PRESSURE: 133 MMHG | DIASTOLIC BLOOD PRESSURE: 78 MMHG | BODY MASS INDEX: 34.8 KG/M2 | WEIGHT: 235 LBS

## 2025-04-28 DIAGNOSIS — M54.81 OCCIPITAL NEURALGIA OF LEFT SIDE: ICD-10-CM

## 2025-04-28 DIAGNOSIS — M54.2 NECK PAIN: Primary | ICD-10-CM

## 2025-04-28 PROCEDURE — 64405 NJX AA&/STRD GR OCPL NRV: CPT | Mod: 59 | Performed by: PSYCHIATRY & NEUROLOGY

## 2025-04-28 PROCEDURE — 3078F DIAST BP <80 MM HG: CPT | Performed by: PSYCHIATRY & NEUROLOGY

## 2025-04-28 PROCEDURE — 20553 NJX 1/MLT TRIGGER POINTS 3/>: CPT | Performed by: PSYCHIATRY & NEUROLOGY

## 2025-04-28 PROCEDURE — 3075F SYST BP GE 130 - 139MM HG: CPT | Performed by: PSYCHIATRY & NEUROLOGY

## 2025-04-28 RX ORDER — BUPIVACAINE HYDROCHLORIDE 2.5 MG/ML
4.25 INJECTION, SOLUTION EPIDURAL; INFILTRATION; INTRACAUDAL; PERINEURAL ONCE
Status: COMPLETED | OUTPATIENT
Start: 2025-04-28 | End: 2025-04-28

## 2025-04-28 RX ORDER — NORTRIPTYLINE HYDROCHLORIDE 10 MG/1
20 CAPSULE ORAL 3 TIMES DAILY
Qty: 540 CAPSULE | Refills: 1 | Status: SHIPPED | OUTPATIENT
Start: 2025-04-28

## 2025-04-28 RX ADMIN — BUPIVACAINE HYDROCHLORIDE 10.75 MG: 2.5 INJECTION, SOLUTION EPIDURAL; INFILTRATION; INTRACAUDAL; PERINEURAL at 13:51

## 2025-04-28 NOTE — PROCEDURES
NEUROLOGY PROCEDURE NOTE  Jul 16, 2024      Occipital nerve block/Trigger point injection    CONSENT: The procedure was explained to the patient. The risks and benefits of the procedure and the alternatives were discussed with the patient. The patient was given an opportunity to ask any questions they had about the procedure. A verbal consent was obtained from the patient. A written consent is available in the chart.    PRE-PROCEDURE  Diagnosis: Left occipital neuralgia/Temporalis and neck pain    EXAM  GENERAL: Healthy appearing, alert, no acute distress, normal habitus.  NEUROLOGICAL:  Patient is alert with fluent language.  Extraocular movements are intact.  Facial movements are present and symmetric.  No arm drift.    PROCEDURE SUMMARY:   The following areas were identified after palpating for landmarks and the overlying skin was cleansed with alcohol. These muscles were then injected using a 30 guage- half inch needle with the following doses of bupivacaine 0.25% preservative-free.     Left greater occipital notch 2 ml  Left lesser occipital notch 0.75 mL  Left splenius capitis muscle 0.5 mL  Left longissimus muscle 0.5 mL  Left trapezius muscle 0.5 mL    Lot Number and Expiration: [id number];  6/27    The patient tolerated the procedure without any immediate complaints and will call the Neurology clinic if she has any problems or side effects from the medication.  The patient noticed some improvement in the pain right after the injection.  The patient will follow up in the Neurology clinic as previously decided.      Eleazar Carlin MD  Neurologist  [facility]  [phone]

## 2025-04-28 NOTE — LETTER
4/28/2025      Barb Hernandez  [address]  [place] [address]      Dear Colleague,    Thank you for referring your patient, Barb Hernandez, to the [facility] [place]. Please see a copy of my visit note below.    NEUROLOGY OUTPATIENT PROGRESS NOTE   Apr 28, 2025     CHIEF COMPLAINT/REASON FOR VISIT/REASON FOR CONSULT  Patient presents with:  occipital neuralgia left side: Patient  is here for trigger point. Follow up    REASON FOR CONSULTATION- Neck/head pain    REFERRAL SOURCE  Dr. Mindy Castañeda  CC Dr. Mindy Castañeda    HISTORY OF PRESENT ILLNESS  Barb Hernandez is a 64 year old female seen today for head/neck pain.  She 20 years ago had a history of cervical fusion.  In December 2022 around [place] she woke up with decreased range of motion her neck.  She was having severe left-sided neck pain radiating to the left occipital region.  She initially saw chiropractor with some benefit and then was referred to a pain clinic.  Did receive occipital nerve block which significantly helped with the symptoms.  She also had a ablation which made things worse.  Has had an MRI of the C/brain that have been noncontributory.  Still complains of some tightness in the neck.  Pain can be more throbbing in nature.  Touching the scalp can cause pain.  There is some stress going on but no injuries to the head that might have led to the headaches.  There was some pins-and-needles pain that went away with the ablation.  There is some photophobia with the headaches.  Rarely they are frontal.  No photophobia.  No visual auras.  Has tried Tylenol without benefit.  Is getting good sleep at night.    In terms of medications she initially tried gabapentin that was causing side effects and then she was switched to Lyrica which is helping though as soon as she runs out of the Lyrica the headache comes back.  Headache can be every day/all the time.    She also complains of memory problems.  Would like  to be investigated further.  Her family is noticing things as well.    7/16/24  Patient returns today  1.  Continues to have the memory problems.  No significant change.  Might be related to lack of sleep.  2.  She is doing much better during the daytime with the tizanidine and Lyrica combination.  Nighttime when she lies down the pain does resurface.  Overall wants to stay on the medication and start with the occipital nerve block today.  3.  No other new concerns.  Wants to do a B12 supplementation as the B12 is slightly on the lower end.    10/24/24  Patient history  1.  She continues to have the pain that she was having before.  Generally starts in the left temporalis muscle and then radiates to her left occipital region and also down the neck to the shoulder.  She is taking nortriptyline 3 times a day which does help but not completely.  If she misses the dose the pain does come back.  Is also on tizanidine which is also helping.  Denies any other associate symptoms or any other provoking factors that she is identified.  Memory is about the same.  No change.  Previous occipital nerve block did not work and she is interested in trying a higher dose.  No side effects with the previous injections.    1/27/25  Patient returns today.  1.  After the injection the headaches had significantly improved.  Denies any side effects.  They have started wearing off in the last couple of days.  Nortriptyline and tizanidine were significantly beneficial as well.  She did try stopping one of the nortriptyline's and the headaches came back.  No side effects with the medications.  2.  Discussed prognosis of occipital neuralgia.  3.  Memory problems are about the same  4.  She does have some pain in her left elbow that is really tender on palpation with some pain radiating down the to the fingers.  Discussed about ulnar neuropathy.  Encouraged her to avoid pressure on the elbows.  Could consider EMG  No other new  concerns.    4/28/25  Patient returns today  1.  Feels that the occipital neuralgia pain has now completely improved.  She has not had any recurrence of this though has been having more frontal headaches.  These happen if she misses the nortriptyline or tizanidine and is late with those medications.  If she takes those medications or takes them later the headache will go away.  Has not needed to take a lot of abortive medication.  No major photophobia or phonophobia with the headaches.  No other new concerns  2.  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  No other new concerns.    Previous history is reviewed and this is unchanged.    PAST MEDICAL/SURGICAL HISTORY  Past Medical History:   Diagnosis Date     Antiplatelet or antithrombotic long-term use      Anxiety      Arrhythmia      Arthritis      Atrial fibrillation (H)      Constipation      Depression      Fibromuscular dysplasia      Gastroesophageal reflux disease      HLD (hyperlipidemia)      HTN (hypertension)      Intraparenchymal renal artery disease      Migraines      Palpitation      PVC's (premature ventricular contractions)      Patient Active Problem List   Diagnosis     S/P ablation of atrial fibrillation     Paroxysmal atrial fibrillation (H)     Benign essential hypertension     Fibromuscular dysplasia   Significant for high cholesterol, high blood pressure, migraines, arthritis    FAMILY HISTORY  Family History   Problem Relation Age of Onset     Cancer Mother      Asthma Mother      Heart Murmur Sister         prolapse     Depression Sister      Coronary Artery Disease Father    Negative for dementia though grandmother did have some memory issues.    SOCIAL HISTORY  Social History     Tobacco Use     Smoking status: Never     Smokeless tobacco: Never   Substance Use Topics     Alcohol use: Yes     Comment: rare      Drug use: No       SYSTEMS REVIEW  Twelve-system ROS was  "done and other than the HPI this was negative/positive for neck pain, back pain, joint pain, headaches, memory loss, anxiety, cardiac/heart problems.  History of atrial fibrillation    MEDICATIONS  Current Outpatient Medications   Medication Sig Dispense Refill     Acetaminophen (TYLENOL PO) Take 650 mg by mouth every 4 hours as needed for mild pain or fever (for migraines)       carvedilol (COREG) 25 MG tablet Take 1 tablet (25 mg) by mouth 2 times daily (with meals) 180 tablet 6     Cyanocobalamin (VITAMIN B-12 PO) Take by mouth 2 times daily.       famotidine (PEPCID) 20 MG tablet Take 20 mg by mouth 2 times daily       losartan (COZAAR) 25 MG tablet Take 3 tablets (75 mg) by mouth daily 270 tablet 6     MELATONIN PO Take by mouth at bedtime       nortriptyline (PAMELOR) 10 MG capsule Take 2 capsules (20 mg) by mouth 3 times daily. 540 capsule 1     PRAVASTATIN SODIUM PO Take 20 mg by mouth every evening        rivaroxaban ANTICOAGULANT (XARELTO ANTICOAGULANT) 20 MG TABS tablet Take 1 tablet (20 mg) by mouth daily (with dinner) 90 tablet 6     senna-docusate (SENOKOT-S;PERICOLACE) 8.6-50 MG per tablet Take 2 tablets by mouth every morning       tiZANidine (ZANAFLEX) 2 MG tablet Take 1 tablet (2 mg) by mouth 3 times daily. 270 tablet 1     venlafaxine (EFFEXOR-ER) 150 MG TB24 24 hr tablet Take 150 mg by mouth every evening        VITAMIN D, CHOLECALCIFEROL, PO Take 2,000 Units by mouth daily       amLODIPine (NORVASC) 5 MG tablet Take 1 tablet (5 mg) by mouth daily (Patient not taking: Reported on 4/28/2025) 90 tablet 6     chlorthalidone (HYGROTON) 25 MG tablet Take 1 tablet by mouth daily       HYDROCHLOROTHIAZIDE PO Take 25 mg by mouth daily (Patient not taking: Reported on 10/24/2024)       No current facility-administered medications for this visit.        PHYSICAL EXAMINATION  VITALS: /78   Pulse 66   Ht 1.753 m (5' 9\")   Wt 106.6 kg (235 lb)   BMI 34.70 kg/m    GENERAL: Healthy appearing, alert, " no acute distress, normal habitus.  CARDIOVASCULAR: Extremities warm and well perfused. Pulses present.   NEUROLOGICAL:  Patient is awake and oriented to self, place and time.  Attention span is normal.  Memory is grossly intact. MOCA 27.  Language is fluent and follows commands appropriately.  Appropriate fund of knowledge. Cranial nerves 2-12 are intact. There is no pronator drift.  Motor exam shows 5/5 strength in all extremities.  Tone is symmetric bilaterally in upper and lower extremities.  Reflexes are symmetric and 2+ in upper extremities and lower extremities. Sensory exam is grossly intact to light touch, pin prick and vibration.  Finger to nose and heel to shin is without dysmetria.  Romberg is negative.  Gait is normal and the patient is able to do tandem walk and walk on toes and heels.  Palpation of the right occipital notch produces some pain (even though headaches on the left side)  Memory change compared to before.  Tenderness in the left elbow.    DIAGNOSTICS  MRI C spine  IMPRESSION:   1. Mild degenerative cervical spondylosis with level by level analysis as described above without evidence of high-grade spinal canal or neural foraminal narrowing at any level.     HEAD MRA:   1.  Unremarkable head MRA.     NECK MRA:   1.  Unremarkable neck MRA.     HEAD MRI:   1.  No recent infarct, intracranial mass, abnormal enhancement or evidence of recent or remote intracranial hemorrhage.   2.  There are just a few scattered nonspecific foci of T2 signal hyperintensity in the supratentorial white matter which is not atypical in a patient of this age and of doubtful current clinical significance. These may reflect foci of nonspecific gliosis or chronic myelin loss, minimal chronic small vessel ischemic changes or sequelae of chronic headaches.     HEAD MRA:   1.  Unremarkable head MRA.     NECK MRA:   1.  Unremarkable neck MRA.     RELEVANT LABS              OUTSIDE RECORDS  Outside referral notes and chart  notes were reviewed and pertinent information has been summarized (in addition to the HPI):-        Component      Latest Ref Rng 6/14/2024  1:13 PM   Vitamin B12      232 - 1,245 pg/mL 457    Vitamin B1 Whole Blood Level      70 - 180 nmol/L 131    TSH      0.30 - 4.20 uIU/mL 0.96          IMPRESSION/REPORT/PLAN  Headaches/Neck pain-rule out occipital neuralgia with migraines versus cervicogenic headaches  Subjective memory complaints  Rule out left ulnar neuropathy.    This is a 64 year old female with history of cervical spine fusion with new onset of neck pain with decreased range of motion, left occipital headaches.  Differential for the headaches would include migraine headaches, occipital neuralgia versus neck pain/cervicogenic headaches.  She has had an occipital nerve block and good response suggesting occipital neuralgia.  Could be mixed headache type.  Her headaches are now spreading to the neck region and this is less likely to be suggestive of occipital neuralgia.    MRI brain/MRI C-spine has been negative for any significant degeneration.  Tizanidine for cervicogenic headaches has been helpful and will continue.  Occipital nerve block/trigger point injections have significantly been helpful and will continue these every 3 months.  She is also on nortriptyline for prevention/pain management along with the tizanidine for neck pain.  Will continue these medications.  Lyrica has not been stopped.    Trigger point injections have significantly help with resolution of the occipital nerve block pain.  Will try to extend this for 4 months instead of 3 months.  Furthermore she has been having more frontal headaches now when she does not get the nortriptyline and tizanidine on time.  Will try to increase the dose of the nortriptyline and see how she does.  Does have underlying A-fib and will monitor the heart rate closely.  Could consider other medication for possible migrainous headaches.    There is some  history of migraines and these could be migrainous in nature also.    For her memory problems this could be related to ongoing stress/ongoing pain.  Blood work is negative though she was to do a B12 supplementation.  MRI brain has been noncontributory.  [place] today was 27.  Could consider neuropsychology in the future if needed.  Wants to give it more time.  No change.    She complains of left elbow pain with some pain radiating down the arm.  Question of ulnar neuropathy.  Will have her avoid putting pressure on the left elbow and if things or not getting better could consider EMG during the next visit.    I can see her back in 4 months.  Medication refilled.      -     tiZANidine (ZANAFLEX) 2 MG tablet; Take 1 tablet (2 mg) by mouth 3 times daily.  -     nortriptyline (PAMELOR) 10 MG capsule; Take 2 capsules (20 mg) by mouth 3 times daily.  - Occipital nerve block and trigger point injection    Return in about 4 months (around 8/28/2025) for In-Clinic Visit (must), Trigger point injections, Add on PAOR.    Over 30 minutes were spent coordinating the care for the patient on the day of the encounter.  This includes previsit, during visit and post visit activities as documented above.  Counseling patient.  Prescription management.  Refractory problem.  (Activities include but not inclusive of reviewing chart, reviewing outside records, reviewing labs and imaging study results as well as the images, patient visit time including getting history and exam,  use if applicable, review of test results with the patient and coming up with a plan in a shared model, counseling patient and family, education and answering patient questions, EMR , EMR diagnosis entry and problem list management, medication reconciliation and prescription management if applicable, paperwork if applicable, printing documents and documentation of the visit activities.)        Eleazar Carlin MD  Neurologist  [facility]  Neurology [facility]  Tel:- [phone]    This note was dictated using voice recognition software.  Any grammatical or context distortions are unintentional and inherent to the software.      Again, thank you for allowing me to participate in the care of your patient.        Sincerely,        Eleazar Carlin MD    Electronically signed

## 2025-04-28 NOTE — NURSING NOTE
Chief Complaint   Patient presents with    occipital neuralgia left side     Patient  is here for trigger point. Follow up     Ana Sullivan on 4/28/2025 at 1:19 PM

## 2025-04-28 NOTE — PROGRESS NOTES
NEUROLOGY OUTPATIENT PROGRESS NOTE   Apr 28, 2025     CHIEF COMPLAINT/REASON FOR VISIT/REASON FOR CONSULT  Patient presents with:  occipital neuralgia left side: Patient  is here for trigger point. Follow up    REASON FOR CONSULTATION- Neck/head pain    REFERRAL SOURCE  Dr. Mindy Castañeda  CC Dr. Mindy Castañeda    HISTORY OF PRESENT ILLNESS  Barb Hernandez is a 64 year old female seen today for head/neck pain.  She 20 years ago had a history of cervical fusion.  In December 2022 around [place] she woke up with decreased range of motion her neck.  She was having severe left-sided neck pain radiating to the left occipital region.  She initially saw chiropractor with some benefit and then was referred to a pain clinic.  Did receive occipital nerve block which significantly helped with the symptoms.  She also had a ablation which made things worse.  Has had an MRI of the C/brain that have been noncontributory.  Still complains of some tightness in the neck.  Pain can be more throbbing in nature.  Touching the scalp can cause pain.  There is some stress going on but no injuries to the head that might have led to the headaches.  There was some pins-and-needles pain that went away with the ablation.  There is some photophobia with the headaches.  Rarely they are frontal.  No photophobia.  No visual auras.  Has tried Tylenol without benefit.  Is getting good sleep at night.    In terms of medications she initially tried gabapentin that was causing side effects and then she was switched to Lyrica which is helping though as soon as she runs out of the Lyrica the headache comes back.  Headache can be every day/all the time.    She also complains of memory problems.  Would like to be investigated further.  Her family is noticing things as well.    7/16/24  Patient returns today  1.  Continues to have the memory problems.  No significant change.  Might be related to lack of sleep.  2.  She is doing much better during  the daytime with the tizanidine and Lyrica combination.  Nighttime when she lies down the pain does resurface.  Overall wants to stay on the medication and start with the occipital nerve block today.  3.  No other new concerns.  Wants to do a B12 supplementation as the B12 is slightly on the lower end.    10/24/24  Patient history  1.  She continues to have the pain that she was having before.  Generally starts in the left temporalis muscle and then radiates to her left occipital region and also down the neck to the shoulder.  She is taking nortriptyline 3 times a day which does help but not completely.  If she misses the dose the pain does come back.  Is also on tizanidine which is also helping.  Denies any other associate symptoms or any other provoking factors that she is identified.  Memory is about the same.  No change.  Previous occipital nerve block did not work and she is interested in trying a higher dose.  No side effects with the previous injections.    1/27/25  Patient returns today.  1.  After the injection the headaches had significantly improved.  Denies any side effects.  They have started wearing off in the last couple of days.  Nortriptyline and tizanidine were significantly beneficial as well.  She did try stopping one of the nortriptyline's and the headaches came back.  No side effects with the medications.  2.  Discussed prognosis of occipital neuralgia.  3.  Memory problems are about the same  4.  She does have some pain in her left elbow that is really tender on palpation with some pain radiating down the to the fingers.  Discussed about ulnar neuropathy.  Encouraged her to avoid pressure on the elbows.  Could consider EMG  No other new concerns.    4/28/25  Patient returns today  1.  Feels that the occipital neuralgia pain has now completely improved.  She has not had any recurrence of this though has been having more frontal headaches.  These happen if she misses the nortriptyline or  tizanidine and is late with those medications.  If she takes those medications or takes them later the headache will go away.  Has not needed to take a lot of abortive medication.  No major photophobia or phonophobia with the headaches.  No other new concerns  2.  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  No other new concerns.    Previous history is reviewed and this is unchanged.    PAST MEDICAL/SURGICAL HISTORY  Past Medical History:   Diagnosis Date    Antiplatelet or antithrombotic long-term use     Anxiety     Arrhythmia     Arthritis     Atrial fibrillation (H)     Constipation     Depression     Fibromuscular dysplasia     Gastroesophageal reflux disease     HLD (hyperlipidemia)     HTN (hypertension)     Intraparenchymal renal artery disease     Migraines     Palpitation     PVC's (premature ventricular contractions)      Patient Active Problem List   Diagnosis    S/P ablation of atrial fibrillation    Paroxysmal atrial fibrillation (H)    Benign essential hypertension    Fibromuscular dysplasia   Significant for high cholesterol, high blood pressure, migraines, arthritis    FAMILY HISTORY  Family History   Problem Relation Age of Onset    Cancer Mother     Asthma Mother     Heart Murmur Sister         prolapse    Depression Sister     Coronary Artery Disease Father    Negative for dementia though grandmother did have some memory issues.    SOCIAL HISTORY  Social History     Tobacco Use    Smoking status: Never    Smokeless tobacco: Never   Substance Use Topics    Alcohol use: Yes     Comment: rare     Drug use: No       SYSTEMS REVIEW  Twelve-system ROS was done and other than the HPI this was negative/positive for neck pain, back pain, joint pain, headaches, memory loss, anxiety, cardiac/heart problems.  History of atrial fibrillation    MEDICATIONS  Current Outpatient Medications   Medication Sig Dispense Refill    Acetaminophen  "(TYLENOL PO) Take 650 mg by mouth every 4 hours as needed for mild pain or fever (for migraines)      carvedilol (COREG) 25 MG tablet Take 1 tablet (25 mg) by mouth 2 times daily (with meals) 180 tablet 6    Cyanocobalamin (VITAMIN B-12 PO) Take by mouth 2 times daily.      famotidine (PEPCID) 20 MG tablet Take 20 mg by mouth 2 times daily      losartan (COZAAR) 25 MG tablet Take 3 tablets (75 mg) by mouth daily 270 tablet 6    MELATONIN PO Take by mouth at bedtime      nortriptyline (PAMELOR) 10 MG capsule Take 2 capsules (20 mg) by mouth 3 times daily. 540 capsule 1    PRAVASTATIN SODIUM PO Take 20 mg by mouth every evening       rivaroxaban ANTICOAGULANT (XARELTO ANTICOAGULANT) 20 MG TABS tablet Take 1 tablet (20 mg) by mouth daily (with dinner) 90 tablet 6    senna-docusate (SENOKOT-S;PERICOLACE) 8.6-50 MG per tablet Take 2 tablets by mouth every morning      tiZANidine (ZANAFLEX) 2 MG tablet Take 1 tablet (2 mg) by mouth 3 times daily. 270 tablet 1    venlafaxine (EFFEXOR-ER) 150 MG TB24 24 hr tablet Take 150 mg by mouth every evening       VITAMIN D, CHOLECALCIFEROL, PO Take 2,000 Units by mouth daily      amLODIPine (NORVASC) 5 MG tablet Take 1 tablet (5 mg) by mouth daily (Patient not taking: Reported on 4/28/2025) 90 tablet 6    chlorthalidone (HYGROTON) 25 MG tablet Take 1 tablet by mouth daily      HYDROCHLOROTHIAZIDE PO Take 25 mg by mouth daily (Patient not taking: Reported on 10/24/2024)       No current facility-administered medications for this visit.        PHYSICAL EXAMINATION  VITALS: /78   Pulse 66   Ht 1.753 m (5' 9\")   Wt 106.6 kg (235 lb)   BMI 34.70 kg/m    GENERAL: Healthy appearing, alert, no acute distress, normal habitus.  CARDIOVASCULAR: Extremities warm and well perfused. Pulses present.   NEUROLOGICAL:  Patient is awake and oriented to self, place and time.  Attention span is normal.  Memory is grossly intact. MOCA 27.  Language is fluent and follows commands appropriately.  " Appropriate fund of knowledge. Cranial nerves 2-12 are intact. There is no pronator drift.  Motor exam shows 5/5 strength in all extremities.  Tone is symmetric bilaterally in upper and lower extremities.  Reflexes are symmetric and 2+ in upper extremities and lower extremities. Sensory exam is grossly intact to light touch, pin prick and vibration.  Finger to nose and heel to shin is without dysmetria.  Romberg is negative.  Gait is normal and the patient is able to do tandem walk and walk on toes and heels.  Palpation of the right occipital notch produces some pain (even though headaches on the left side)  Memory change compared to before.  Tenderness in the left elbow.    DIAGNOSTICS  MRI C spine  IMPRESSION:   1. Mild degenerative cervical spondylosis with level by level analysis as described above without evidence of high-grade spinal canal or neural foraminal narrowing at any level.     HEAD MRA:   1.  Unremarkable head MRA.     NECK MRA:   1.  Unremarkable neck MRA.     HEAD MRI:   1.  No recent infarct, intracranial mass, abnormal enhancement or evidence of recent or remote intracranial hemorrhage.   2.  There are just a few scattered nonspecific foci of T2 signal hyperintensity in the supratentorial white matter which is not atypical in a patient of this age and of doubtful current clinical significance. These may reflect foci of nonspecific gliosis or chronic myelin loss, minimal chronic small vessel ischemic changes or sequelae of chronic headaches.     HEAD MRA:   1.  Unremarkable head MRA.     NECK MRA:   1.  Unremarkable neck MRA.     RELEVANT LABS              OUTSIDE RECORDS  Outside referral notes and chart notes were reviewed and pertinent information has been summarized (in addition to the HPI):-        Component      Latest Ref Rng 6/14/2024  1:13 PM   Vitamin B12      232 - 1,245 pg/mL 457    Vitamin B1 Whole Blood Level      70 - 180 nmol/L 131    TSH      0.30 - 4.20 uIU/mL 0.96           IMPRESSION/REPORT/PLAN  Headaches/Neck pain-rule out occipital neuralgia with migraines versus cervicogenic headaches  Subjective memory complaints  Rule out left ulnar neuropathy.    This is a 64 year old female with history of cervical spine fusion with new onset of neck pain with decreased range of motion, left occipital headaches.  Differential for the headaches would include migraine headaches, occipital neuralgia versus neck pain/cervicogenic headaches.  She has had an occipital nerve block and good response suggesting occipital neuralgia.  Could be mixed headache type.  Her headaches are now spreading to the neck region and this is less likely to be suggestive of occipital neuralgia.    MRI brain/MRI C-spine has been negative for any significant degeneration.  Tizanidine for cervicogenic headaches has been helpful and will continue.  Occipital nerve block/trigger point injections have significantly been helpful and will continue these every 3 months.  She is also on nortriptyline for prevention/pain management along with the tizanidine for neck pain.  Will continue these medications.  Lyrica has not been stopped.    Trigger point injections have significantly help with resolution of the occipital nerve block pain.  Will try to extend this for 4 months instead of 3 months.  Furthermore she has been having more frontal headaches now when she does not get the nortriptyline and tizanidine on time.  Will try to increase the dose of the nortriptyline and see how she does.  Does have underlying A-fib and will monitor the heart rate closely.  Could consider other medication for possible migrainous headaches.    There is some history of migraines and these could be migrainous in nature also.    For her memory problems this could be related to ongoing stress/ongoing pain.  Blood work is negative though she was to do a B12 supplementation.  MRI brain has been noncontributory.  [place] today was 27.  Could consider  neuropsychology in the future if needed.  Wants to give it more time.  No change.    She complains of left elbow pain with some pain radiating down the arm.  Question of ulnar neuropathy.  Will have her avoid putting pressure on the left elbow and if things or not getting better could consider EMG during the next visit.    I can see her back in 4 months.  Medication refilled.      -     tiZANidine (ZANAFLEX) 2 MG tablet; Take 1 tablet (2 mg) by mouth 3 times daily.  -     nortriptyline (PAMELOR) 10 MG capsule; Take 2 capsules (20 mg) by mouth 3 times daily.  - Occipital nerve block and trigger point injection    Return in about 4 months (around 8/28/2025) for In-Clinic Visit (must), Trigger point injections, Add on PAOR.    Over 30 minutes were spent coordinating the care for the patient on the day of the encounter.  This includes previsit, during visit and post visit activities as documented above.  Counseling patient.  Prescription management.  Refractory problem.  (Activities include but not inclusive of reviewing chart, reviewing outside records, reviewing labs and imaging study results as well as the images, patient visit time including getting history and exam,  use if applicable, review of test results with the patient and coming up with a plan in a shared model, counseling patient and family, education and answering patient questions, EMR , EMR diagnosis entry and problem list management, medication reconciliation and prescription management if applicable, paperwork if applicable, printing documents and documentation of the visit activities.)        Eleazar Carlin MD  Neurologist  [facility]  Tel:- [phone]    This note was dictated using voice recognition software.  Any grammatical or context distortions are unintentional and inherent to the software.

## 2025-07-19 ENCOUNTER — HEALTH MAINTENANCE LETTER (OUTPATIENT)
Age: 65
End: 2025-07-19

## 2025-08-03 DIAGNOSIS — M54.2 NECK PAIN: ICD-10-CM

## 2025-08-03 DIAGNOSIS — M54.81 OCCIPITAL NEURALGIA OF LEFT SIDE: ICD-10-CM

## 2025-08-06 RX ORDER — TIZANIDINE 2 MG/1
2 TABLET ORAL 3 TIMES DAILY
Qty: 270 TABLET | Refills: 1 | Status: SHIPPED | OUTPATIENT
Start: 2025-08-06

## 2025-08-28 ENCOUNTER — OFFICE VISIT (OUTPATIENT)
Dept: NEUROLOGY | Facility: CLINIC | Age: 65
End: 2025-08-28
Payer: COMMERCIAL

## 2025-08-28 VITALS
SYSTOLIC BLOOD PRESSURE: 120 MMHG | BODY MASS INDEX: 34.78 KG/M2 | WEIGHT: 235.5 LBS | DIASTOLIC BLOOD PRESSURE: 64 MMHG | HEART RATE: 64 BPM

## 2025-08-28 DIAGNOSIS — M54.81 OCCIPITAL NEURALGIA OF LEFT SIDE: ICD-10-CM

## 2025-08-28 DIAGNOSIS — G43.809 OTHER MIGRAINE WITHOUT STATUS MIGRAINOSUS, NOT INTRACTABLE: Primary | ICD-10-CM

## 2025-08-28 DIAGNOSIS — M54.2 NECK PAIN: ICD-10-CM

## 2025-08-28 RX ORDER — BUPIVACAINE HYDROCHLORIDE 2.5 MG/ML
5.25 INJECTION, SOLUTION EPIDURAL; INFILTRATION; INTRACAUDAL; PERINEURAL ONCE
Status: COMPLETED | OUTPATIENT
Start: 2025-08-28 | End: 2025-08-28

## 2025-08-28 RX ADMIN — BUPIVACAINE HYDROCHLORIDE 13.25 MG: 2.5 INJECTION, SOLUTION EPIDURAL; INFILTRATION; INTRACAUDAL; PERINEURAL at 14:25

## (undated) RX ORDER — HEPARIN SODIUM 1000 [USP'U]/ML
INJECTION, SOLUTION INTRAVENOUS; SUBCUTANEOUS
Status: DISPENSED
Start: 2017-08-17

## (undated) RX ORDER — ONDANSETRON 2 MG/ML
INJECTION INTRAMUSCULAR; INTRAVENOUS
Status: DISPENSED
Start: 2018-02-06

## (undated) RX ORDER — PROTAMINE SULFATE 10 MG/ML
INJECTION, SOLUTION INTRAVENOUS
Status: DISPENSED
Start: 2018-02-06

## (undated) RX ORDER — ROCURONIUM BROMIDE 50 MG/5 ML
SYRINGE (ML) INTRAVENOUS
Status: DISPENSED
Start: 2017-08-17

## (undated) RX ORDER — PROPOFOL 10 MG/ML
INJECTION, EMULSION INTRAVENOUS
Status: DISPENSED
Start: 2018-02-06

## (undated) RX ORDER — PROPOFOL 10 MG/ML
INJECTION, EMULSION INTRAVENOUS
Status: DISPENSED
Start: 2017-08-17

## (undated) RX ORDER — EPHEDRINE SULFATE 50 MG/ML
INJECTION, SOLUTION INTRAMUSCULAR; INTRAVENOUS; SUBCUTANEOUS
Status: DISPENSED
Start: 2018-02-06

## (undated) RX ORDER — FENTANYL CITRATE 50 UG/ML
INJECTION, SOLUTION INTRAMUSCULAR; INTRAVENOUS
Status: DISPENSED
Start: 2017-08-17

## (undated) RX ORDER — SODIUM CHLORIDE, SODIUM LACTATE, POTASSIUM CHLORIDE, CALCIUM CHLORIDE 600; 310; 30; 20 MG/100ML; MG/100ML; MG/100ML; MG/100ML
INJECTION, SOLUTION INTRAVENOUS
Status: DISPENSED
Start: 2017-08-17

## (undated) RX ORDER — PHENYLEPHRINE HCL IN 0.9% NACL 1 MG/10 ML
SYRINGE (ML) INTRAVENOUS
Status: DISPENSED
Start: 2018-02-06

## (undated) RX ORDER — FUROSEMIDE 10 MG/ML
INJECTION INTRAMUSCULAR; INTRAVENOUS
Status: DISPENSED
Start: 2017-08-17

## (undated) RX ORDER — HYDROMORPHONE HYDROCHLORIDE 1 MG/ML
INJECTION, SOLUTION INTRAMUSCULAR; INTRAVENOUS; SUBCUTANEOUS
Status: DISPENSED
Start: 2017-08-17

## (undated) RX ORDER — ADENOSINE 3 MG/ML
INJECTION, SOLUTION INTRAVENOUS
Status: DISPENSED
Start: 2018-02-06

## (undated) RX ORDER — PROTAMINE SULFATE 10 MG/ML
INJECTION, SOLUTION INTRAVENOUS
Status: DISPENSED
Start: 2017-08-17

## (undated) RX ORDER — DEXAMETHASONE SODIUM PHOSPHATE 4 MG/ML
INJECTION, SOLUTION INTRA-ARTICULAR; INTRALESIONAL; INTRAMUSCULAR; INTRAVENOUS; SOFT TISSUE
Status: DISPENSED
Start: 2018-02-06

## (undated) RX ORDER — GLYCOPYRROLATE 0.2 MG/ML
INJECTION, SOLUTION INTRAMUSCULAR; INTRAVENOUS
Status: DISPENSED
Start: 2018-02-06

## (undated) RX ORDER — FENTANYL CITRATE 50 UG/ML
INJECTION, SOLUTION INTRAMUSCULAR; INTRAVENOUS
Status: DISPENSED
Start: 2018-02-06

## (undated) RX ORDER — ACETAMINOPHEN 325 MG/1
TABLET ORAL
Status: DISPENSED
Start: 2017-08-17

## (undated) RX ORDER — GABAPENTIN 100 MG/1
CAPSULE ORAL
Status: DISPENSED
Start: 2017-08-17

## (undated) RX ORDER — HYDROMORPHONE HCL/0.9% NACL/PF 0.2MG/0.2
SYRINGE (ML) INTRAVENOUS
Status: DISPENSED
Start: 2017-08-17

## (undated) RX ORDER — HEPARIN SODIUM 1000 [USP'U]/ML
INJECTION, SOLUTION INTRAVENOUS; SUBCUTANEOUS
Status: DISPENSED
Start: 2018-02-06

## (undated) RX ORDER — OXYCODONE AND ACETAMINOPHEN 5; 325 MG/1; MG/1
TABLET ORAL
Status: DISPENSED
Start: 2018-02-06

## (undated) RX ORDER — ONDANSETRON 2 MG/ML
INJECTION INTRAMUSCULAR; INTRAVENOUS
Status: DISPENSED
Start: 2017-08-17

## (undated) RX ORDER — GLYCOPYRROLATE 0.2 MG/ML
INJECTION, SOLUTION INTRAMUSCULAR; INTRAVENOUS
Status: DISPENSED
Start: 2017-08-17

## (undated) RX ORDER — PHENYLEPHRINE HCL IN 0.9% NACL 1 MG/10 ML
SYRINGE (ML) INTRAVENOUS
Status: DISPENSED
Start: 2017-08-17

## (undated) RX ORDER — ESMOLOL HYDROCHLORIDE 10 MG/ML
INJECTION INTRAVENOUS
Status: DISPENSED
Start: 2017-08-17